# Patient Record
Sex: FEMALE | Race: WHITE | NOT HISPANIC OR LATINO | Employment: FULL TIME | URBAN - METROPOLITAN AREA
[De-identification: names, ages, dates, MRNs, and addresses within clinical notes are randomized per-mention and may not be internally consistent; named-entity substitution may affect disease eponyms.]

---

## 2021-06-12 ENCOUNTER — HOSPITAL ENCOUNTER (EMERGENCY)
Facility: HOSPITAL | Age: 31
Discharge: HOME | End: 2021-06-12
Attending: EMERGENCY MEDICINE | Admitting: EMERGENCY MEDICINE

## 2021-06-12 ENCOUNTER — APPOINTMENT (OUTPATIENT)
Dept: GENERAL RADIOLOGY | Facility: HOSPITAL | Age: 31
End: 2021-06-12

## 2021-06-12 VITALS
BODY MASS INDEX: 28.61 KG/M2 | DIASTOLIC BLOOD PRESSURE: 84 MMHG | OXYGEN SATURATION: 99 % | HEART RATE: 74 BPM | HEIGHT: 66 IN | RESPIRATION RATE: 18 BRPM | WEIGHT: 178 LBS | TEMPERATURE: 97.5 F | SYSTOLIC BLOOD PRESSURE: 121 MMHG

## 2021-06-12 DIAGNOSIS — R00.2 PALPITATIONS: Primary | ICD-10-CM

## 2021-06-12 DIAGNOSIS — F16.10 ECSTASY ABUSE (HCC): ICD-10-CM

## 2021-06-12 DIAGNOSIS — R79.89 ELEVATED BRAIN NATRIURETIC PEPTIDE (BNP) LEVEL: ICD-10-CM

## 2021-06-12 LAB
ALBUMIN SERPL-MCNC: 4 G/DL (ref 3.5–5.2)
ALBUMIN/GLOB SERPL: 1.2 G/DL
ALP SERPL-CCNC: 80 U/L (ref 39–117)
ALT SERPL W P-5'-P-CCNC: 13 U/L (ref 1–33)
AMPHET+METHAMPHET UR QL: POSITIVE
ANION GAP SERPL CALCULATED.3IONS-SCNC: 8.5 MMOL/L (ref 5–15)
AST SERPL-CCNC: 16 U/L (ref 1–32)
B-HCG UR QL: NEGATIVE
BARBITURATES UR QL SCN: NEGATIVE
BASOPHILS # BLD AUTO: 0.06 10*3/MM3 (ref 0–0.2)
BASOPHILS NFR BLD AUTO: 0.8 % (ref 0–1.5)
BENZODIAZ UR QL SCN: NEGATIVE
BILIRUB SERPL-MCNC: 0.3 MG/DL (ref 0–1.2)
BUN SERPL-MCNC: 7 MG/DL (ref 6–20)
BUN/CREAT SERPL: 11.5 (ref 7–25)
CALCIUM SPEC-SCNC: 9.6 MG/DL (ref 8.6–10.5)
CANNABINOIDS SERPL QL: NEGATIVE
CHLORIDE SERPL-SCNC: 106 MMOL/L (ref 98–107)
CO2 SERPL-SCNC: 28.5 MMOL/L (ref 22–29)
COCAINE UR QL: NEGATIVE
CREAT SERPL-MCNC: 0.61 MG/DL (ref 0.57–1)
D DIMER PPP FEU-MCNC: 0.43 MCGFEU/ML (ref 0–0.49)
DEPRECATED RDW RBC AUTO: 45.8 FL (ref 37–54)
EOSINOPHIL # BLD AUTO: 0.03 10*3/MM3 (ref 0–0.4)
EOSINOPHIL NFR BLD AUTO: 0.4 % (ref 0.3–6.2)
ERYTHROCYTE [DISTWIDTH] IN BLOOD BY AUTOMATED COUNT: 13.3 % (ref 12.3–15.4)
GFR SERPL CREATININE-BSD FRML MDRD: 114 ML/MIN/1.73
GFR SERPL CREATININE-BSD FRML MDRD: 139 ML/MIN/1.73
GLOBULIN UR ELPH-MCNC: 3.4 GM/DL
GLUCOSE SERPL-MCNC: 92 MG/DL (ref 65–99)
HCT VFR BLD AUTO: 43.4 % (ref 34–46.6)
HGB BLD-MCNC: 14.5 G/DL (ref 12–15.9)
IMM GRANULOCYTES # BLD AUTO: 0.04 10*3/MM3 (ref 0–0.05)
IMM GRANULOCYTES NFR BLD AUTO: 0.5 % (ref 0–0.5)
LYMPHOCYTES # BLD AUTO: 0.56 10*3/MM3 (ref 0.7–3.1)
LYMPHOCYTES NFR BLD AUTO: 7 % (ref 19.6–45.3)
MCH RBC QN AUTO: 31.3 PG (ref 26.6–33)
MCHC RBC AUTO-ENTMCNC: 33.4 G/DL (ref 31.5–35.7)
MCV RBC AUTO: 93.7 FL (ref 79–97)
METHADONE UR QL SCN: NEGATIVE
MONOCYTES # BLD AUTO: 0.86 10*3/MM3 (ref 0.1–0.9)
MONOCYTES NFR BLD AUTO: 10.8 % (ref 5–12)
NEUTROPHILS NFR BLD AUTO: 6.42 10*3/MM3 (ref 1.7–7)
NEUTROPHILS NFR BLD AUTO: 80.5 % (ref 42.7–76)
NRBC BLD AUTO-RTO: 0.1 /100 WBC (ref 0–0.2)
NT-PROBNP SERPL-MCNC: 782 PG/ML (ref 0–450)
OPIATES UR QL: NEGATIVE
OXYCODONE UR QL SCN: NEGATIVE
PLATELET # BLD AUTO: 243 10*3/MM3 (ref 140–450)
PMV BLD AUTO: 9.7 FL (ref 6–12)
POTASSIUM SERPL-SCNC: 3.9 MMOL/L (ref 3.5–5.2)
PROT SERPL-MCNC: 7.4 G/DL (ref 6–8.5)
RBC # BLD AUTO: 4.63 10*6/MM3 (ref 3.77–5.28)
SODIUM SERPL-SCNC: 143 MMOL/L (ref 136–145)
TROPONIN T SERPL-MCNC: <0.01 NG/ML (ref 0–0.03)
TROPONIN T SERPL-MCNC: <0.01 NG/ML (ref 0–0.03)
WBC # BLD AUTO: 7.97 10*3/MM3 (ref 3.4–10.8)

## 2021-06-12 PROCEDURE — 80307 DRUG TEST PRSMV CHEM ANLYZR: CPT | Performed by: EMERGENCY MEDICINE

## 2021-06-12 PROCEDURE — 85379 FIBRIN DEGRADATION QUANT: CPT | Performed by: EMERGENCY MEDICINE

## 2021-06-12 PROCEDURE — 81025 URINE PREGNANCY TEST: CPT | Performed by: EMERGENCY MEDICINE

## 2021-06-12 PROCEDURE — 80053 COMPREHEN METABOLIC PANEL: CPT | Performed by: EMERGENCY MEDICINE

## 2021-06-12 PROCEDURE — 83880 ASSAY OF NATRIURETIC PEPTIDE: CPT | Performed by: EMERGENCY MEDICINE

## 2021-06-12 PROCEDURE — 93010 ELECTROCARDIOGRAM REPORT: CPT | Performed by: INTERNAL MEDICINE

## 2021-06-12 PROCEDURE — 85025 COMPLETE CBC W/AUTO DIFF WBC: CPT | Performed by: EMERGENCY MEDICINE

## 2021-06-12 PROCEDURE — 99283 EMERGENCY DEPT VISIT LOW MDM: CPT

## 2021-06-12 PROCEDURE — 93005 ELECTROCARDIOGRAM TRACING: CPT | Performed by: EMERGENCY MEDICINE

## 2021-06-12 PROCEDURE — 71046 X-RAY EXAM CHEST 2 VIEWS: CPT

## 2021-06-12 PROCEDURE — 84484 ASSAY OF TROPONIN QUANT: CPT | Performed by: EMERGENCY MEDICINE

## 2021-06-12 RX ORDER — SODIUM CHLORIDE 0.9 % (FLUSH) 0.9 %
10 SYRINGE (ML) INJECTION AS NEEDED
Status: DISCONTINUED | OUTPATIENT
Start: 2021-06-12 | End: 2021-06-12 | Stop reason: HOSPADM

## 2021-06-12 RX ORDER — OMEPRAZOLE 20 MG/1
20 CAPSULE, DELAYED RELEASE ORAL DAILY
COMMUNITY

## 2021-06-12 RX ORDER — AMOXICILLIN AND CLAVULANATE POTASSIUM 875; 125 MG/1; MG/1
1 TABLET, FILM COATED ORAL 2 TIMES DAILY
Qty: 14 TABLET | Refills: 0 | Status: SHIPPED | OUTPATIENT
Start: 2021-06-12 | End: 2021-06-19

## 2021-06-12 RX ORDER — IBUPROFEN 200 MG
400 TABLET ORAL EVERY 6 HOURS PRN
COMMUNITY

## 2021-06-12 RX ADMIN — SODIUM CHLORIDE, POTASSIUM CHLORIDE, SODIUM LACTATE AND CALCIUM CHLORIDE 1000 ML: 600; 310; 30; 20 INJECTION, SOLUTION INTRAVENOUS at 17:28

## 2021-06-12 NOTE — DISCHARGE INSTRUCTIONS
Please return to the emergency department immediately if you have any chest pain, shortness of breath, palpitations, swelling in your legs, fevers, or any other concerning symptoms.

## 2021-06-12 NOTE — ED TRIAGE NOTES
.Pt masked on arrival, staff masked    Pt reports palpitations and legs feel week, was at a friends and did esctasy

## 2021-06-12 NOTE — ED PROVIDER NOTES
Subjective   30-year-old female with no significant past medical history is here for chief complaint of palpitations and shortness of breath.  History is obtained from the patient.  The patient states that yesterday night into early morning she did some ecstasy.  She states that she was drinking my cards lemonade.  She states that when she woke up this morning she felt like her heart was beating fast and that she was short of breath.  She denies any chest pain.  She denies any abdominal pain.  She denies any nausea, diaphoresis, family history of heart conditions, previous history of PE or DVT.  She denies any swelling in her legs.  Patient denies any tearing or ripping pain to her back.  She denies any abdominal pain, nausea, vomiting, diarrhea, fevers, chills, rashes, or any other symptoms.  She states that the ecstasy did not like her.  She states that she has done ecstasy before.  She states that she does smoke cigarettes.  Patient denies any suicidal ideations, homicidal ideations, or any hallucinations.          Review of Systems   All other systems reviewed and are negative.      History reviewed. No pertinent past medical history.    No Known Allergies    History reviewed. No pertinent surgical history.    History reviewed. No pertinent family history.    Social History     Socioeconomic History   • Marital status: Single     Spouse name: Not on file   • Number of children: Not on file   • Years of education: Not on file   • Highest education level: Not on file   Tobacco Use   • Smoking status: Current Every Day Smoker     Packs/day: 1.50     Types: Cigarettes   • Smokeless tobacco: Never Used   Vaping Use   • Vaping Use: Never used   Substance and Sexual Activity   • Alcohol use: Yes     Comment: 4 mikes hard drinks on days not working, pt works 4 days a week   • Drug use: Yes     Frequency: 1.0 times per week     Comment: Ecstacy once a week   • Sexual activity: Defer           Objective   Physical  Exam  Vitals reviewed.   Constitutional:       General: She is not in acute distress.     Appearance: She is not ill-appearing, toxic-appearing or diaphoretic.   HENT:      Head: Normocephalic and atraumatic.      Right Ear: External ear normal.      Left Ear: External ear normal.      Nose: Nose normal. No congestion or rhinorrhea.      Mouth/Throat:      Mouth: Mucous membranes are moist.      Pharynx: Oropharynx is clear. No oropharyngeal exudate or posterior oropharyngeal erythema.   Eyes:      General: No scleral icterus.        Right eye: No discharge.         Left eye: No discharge.      Extraocular Movements: Extraocular movements intact.      Conjunctiva/sclera: Conjunctivae normal.      Pupils: Pupils are equal, round, and reactive to light.   Cardiovascular:      Rate and Rhythm: Normal rate and regular rhythm.      Pulses: Normal pulses.      Heart sounds: Normal heart sounds. No murmur heard.   No friction rub. No gallop.    Pulmonary:      Effort: Pulmonary effort is normal. No respiratory distress.      Breath sounds: Normal breath sounds. No stridor. No wheezing, rhonchi or rales.   Chest:      Chest wall: No tenderness.   Abdominal:      General: Abdomen is flat. Bowel sounds are normal. There is no distension.      Tenderness: There is no abdominal tenderness. There is no right CVA tenderness, left CVA tenderness, guarding or rebound.   Musculoskeletal:      Cervical back: Normal range of motion and neck supple. No rigidity or tenderness.   Skin:     General: Skin is warm and dry.      Capillary Refill: Capillary refill takes less than 2 seconds.      Coloration: Skin is not jaundiced or pale.      Findings: No bruising, erythema, lesion or rash.   Neurological:      General: No focal deficit present.      Mental Status: She is alert and oriented to person, place, and time.      Sensory: No sensory deficit.      Motor: No weakness.   Psychiatric:         Mood and Affect: Mood normal.          Behavior: Behavior normal.         Thought Content: Thought content normal.         Procedures           ED Course  ED Course as of Jun 12 2049   Sat Jun 12, 2021   1720 I reviewed the EKG, normal sinus rhythm, no obvious ST elevation, rate of 68, no evidence of ST depressions, to inversion in lead III.    [KS]   1815 Amphet/Methamphet, Screen(!): Positive [KS]   1948 proBNP(!): 782.0 [KS]      ED Course User Index  [KS] Mervin Ospina MD                                           MDM  Number of Diagnoses or Management Options     Amount and/or Complexity of Data Reviewed  Clinical lab tests: ordered and reviewed  Tests in the radiology section of CPT®: ordered and reviewed    Risk of Complications, Morbidity, and/or Mortality  General comments: When I first saw the patient, the patient appeared nontoxic.  She did tell me that she had done ecstasy and had drank alcohol and that is why she was having the palpitations this morning when she woke up.  IV was started labs were obtained.  I did get a D-dimer that was negative making PE less likely.  I did get 2 troponins 2 hours apart that were normal and the patient does not really have a lot risk factors for cardiac disease besides her smoking.  She has no family history, no history of hypertension, hyperlipidemia, or diabetes.  Patient did have a T wave version lead III.  Patient's heart score was 2.  I did get a chest x-ray this was negative for pneumothorax.  Patient did have a possibility of viral pneumonia.  She does smoke.  We did go over that she needs to stop smoking.  I did tell her that if she continues having any kind of pain or any cough that she needs to get a CT of the chest given her smoking history since she does have history of cancer in her family.  The patient did express understanding.  I did call in for Augmentin 875 twice daily for 7 days. Pt was given strict return precautions including any chest pain, shortness of breath, fevers, cough, or any  other concerning symptoms.  I did review the chest x-ray with the patient.  She had initially been discharged but I did run behind her and did review the chest x-ray with her.  I did also recommend that she not smoke moving forward.  I also recommended that she did not do ecstasy.  Patient was in agreement.  I did tell her to  the prescription from [facility].  Patient told me that that was her preferred pharmacy location.  Patient was told to follow-up with cardiology as well given that the BNP was elevated.  I did go over this with her.  Patient was also in agreement for this.    Patient Progress  Patient progress: stable      Final diagnoses:   Palpitations   Ecstasy abuse (CMS/HCC)   Elevated brain natriuretic peptide (BNP) level       ED Disposition  ED Disposition     ED Disposition Condition Comment    Discharge Stable           Provider, No Known  [place] [id number] [phone]    Schedule an appointment as soon as possible for a visit in 2 days      Zay Mcmahon Jr., MD  [address] [place] [address] [phone]    Schedule an appointment as soon as possible for a visit in 3 days           Medication List      New Prescriptions    amoxicillin-clavulanate 875-125 MG per tablet  Commonly known as: AUGMENTIN  Take 1 tablet by mouth 2 (Two) Times a Day for 7 days.           Where to Get Your Medications      These medications were sent to [id number] - [place], [place] - [address]  AT [facility] - [phone]  - [phone] FX [address] [facility] [address]    Hours: 24-hours Phone: [phone]   · amoxicillin-clavulanate 875-125 MG per tablet          Mervin Ospina MD  06/12/21 2048       Mervin Ospina MD  06/12/21 2049

## 2021-06-12 NOTE — ED NOTES
Patient was placed in face mask in first look.  Patient was wearing a face mask throughout our encounter.  I wore protective eye protection throughout the encounter.  Hand hygiene was performed before and after patient encounter.        Courtney Dotson RN  06/12/21 5834

## 2021-06-13 LAB — QT INTERVAL: 424 MS

## 2021-06-19 ENCOUNTER — HOSPITAL ENCOUNTER (EMERGENCY)
Facility: HOSPITAL | Age: 31
Discharge: HOME | End: 2021-06-20
Attending: EMERGENCY MEDICINE | Admitting: EMERGENCY MEDICINE

## 2021-06-19 ENCOUNTER — APPOINTMENT (OUTPATIENT)
Dept: GENERAL RADIOLOGY | Facility: HOSPITAL | Age: 31
End: 2021-06-19

## 2021-06-19 DIAGNOSIS — R07.89 ATYPICAL CHEST PAIN: ICD-10-CM

## 2021-06-19 DIAGNOSIS — B34.8 RHINOVIRUS INFECTION: ICD-10-CM

## 2021-06-19 DIAGNOSIS — J12.9 VIRAL PNEUMONIA: Primary | ICD-10-CM

## 2021-06-19 LAB
ALBUMIN SERPL-MCNC: 3.7 G/DL (ref 3.5–5.2)
ALBUMIN/GLOB SERPL: 1.2 G/DL
ALP SERPL-CCNC: 78 U/L (ref 39–117)
ALT SERPL W P-5'-P-CCNC: 12 U/L (ref 1–33)
ANION GAP SERPL CALCULATED.3IONS-SCNC: 9.8 MMOL/L (ref 5–15)
AST SERPL-CCNC: 15 U/L (ref 1–32)
BASOPHILS # BLD AUTO: 0.08 10*3/MM3 (ref 0–0.2)
BASOPHILS NFR BLD AUTO: 1 % (ref 0–1.5)
BILIRUB SERPL-MCNC: 0.2 MG/DL (ref 0–1.2)
BUN SERPL-MCNC: 10 MG/DL (ref 6–20)
BUN/CREAT SERPL: 16.7 (ref 7–25)
CALCIUM SPEC-SCNC: 8.9 MG/DL (ref 8.6–10.5)
CHLORIDE SERPL-SCNC: 104 MMOL/L (ref 98–107)
CO2 SERPL-SCNC: 26.2 MMOL/L (ref 22–29)
CREAT SERPL-MCNC: 0.6 MG/DL (ref 0.57–1)
D DIMER PPP FEU-MCNC: 0.35 MCGFEU/ML (ref 0–0.49)
DEPRECATED RDW RBC AUTO: 42.1 FL (ref 37–54)
EOSINOPHIL # BLD AUTO: 0.08 10*3/MM3 (ref 0–0.4)
EOSINOPHIL NFR BLD AUTO: 1 % (ref 0.3–6.2)
ERYTHROCYTE [DISTWIDTH] IN BLOOD BY AUTOMATED COUNT: 13 % (ref 12.3–15.4)
GFR SERPL CREATININE-BSD FRML MDRD: 117 ML/MIN/1.73
GLOBULIN UR ELPH-MCNC: 3 GM/DL
GLUCOSE SERPL-MCNC: 125 MG/DL (ref 65–99)
HCT VFR BLD AUTO: 40 % (ref 34–46.6)
HGB BLD-MCNC: 13.8 G/DL (ref 12–15.9)
IMM GRANULOCYTES # BLD AUTO: 0.04 10*3/MM3 (ref 0–0.05)
IMM GRANULOCYTES NFR BLD AUTO: 0.5 % (ref 0–0.5)
LYMPHOCYTES # BLD AUTO: 0.58 10*3/MM3 (ref 0.7–3.1)
LYMPHOCYTES NFR BLD AUTO: 7 % (ref 19.6–45.3)
MCH RBC QN AUTO: 31 PG (ref 26.6–33)
MCHC RBC AUTO-ENTMCNC: 34.5 G/DL (ref 31.5–35.7)
MCV RBC AUTO: 89.9 FL (ref 79–97)
MONOCYTES # BLD AUTO: 0.73 10*3/MM3 (ref 0.1–0.9)
MONOCYTES NFR BLD AUTO: 8.8 % (ref 5–12)
NEUTROPHILS NFR BLD AUTO: 6.8 10*3/MM3 (ref 1.7–7)
NEUTROPHILS NFR BLD AUTO: 81.7 % (ref 42.7–76)
NRBC BLD AUTO-RTO: 0 /100 WBC (ref 0–0.2)
PLATELET # BLD AUTO: 253 10*3/MM3 (ref 140–450)
PMV BLD AUTO: 9.1 FL (ref 6–12)
POTASSIUM SERPL-SCNC: 3.6 MMOL/L (ref 3.5–5.2)
PROCALCITONIN SERPL-MCNC: 0.06 NG/ML (ref 0–0.25)
PROT SERPL-MCNC: 6.7 G/DL (ref 6–8.5)
RBC # BLD AUTO: 4.45 10*6/MM3 (ref 3.77–5.28)
SODIUM SERPL-SCNC: 140 MMOL/L (ref 136–145)
TROPONIN T SERPL-MCNC: <0.01 NG/ML (ref 0–0.03)
WBC # BLD AUTO: 8.31 10*3/MM3 (ref 3.4–10.8)

## 2021-06-19 PROCEDURE — 84145 PROCALCITONIN (PCT): CPT | Performed by: PHYSICIAN ASSISTANT

## 2021-06-19 PROCEDURE — 80053 COMPREHEN METABOLIC PANEL: CPT | Performed by: PHYSICIAN ASSISTANT

## 2021-06-19 PROCEDURE — 93005 ELECTROCARDIOGRAM TRACING: CPT

## 2021-06-19 PROCEDURE — 93005 ELECTROCARDIOGRAM TRACING: CPT | Performed by: EMERGENCY MEDICINE

## 2021-06-19 PROCEDURE — 93010 ELECTROCARDIOGRAM REPORT: CPT | Performed by: INTERNAL MEDICINE

## 2021-06-19 PROCEDURE — 0202U NFCT DS 22 TRGT SARS-COV-2: CPT | Performed by: PHYSICIAN ASSISTANT

## 2021-06-19 PROCEDURE — 85025 COMPLETE CBC W/AUTO DIFF WBC: CPT | Performed by: PHYSICIAN ASSISTANT

## 2021-06-19 PROCEDURE — 99284 EMERGENCY DEPT VISIT MOD MDM: CPT

## 2021-06-19 PROCEDURE — 85379 FIBRIN DEGRADATION QUANT: CPT | Performed by: PHYSICIAN ASSISTANT

## 2021-06-19 PROCEDURE — 71045 X-RAY EXAM CHEST 1 VIEW: CPT

## 2021-06-19 PROCEDURE — 99283 EMERGENCY DEPT VISIT LOW MDM: CPT

## 2021-06-19 PROCEDURE — 84484 ASSAY OF TROPONIN QUANT: CPT | Performed by: PHYSICIAN ASSISTANT

## 2021-06-19 RX ORDER — SODIUM CHLORIDE 0.9 % (FLUSH) 0.9 %
10 SYRINGE (ML) INJECTION AS NEEDED
Status: DISCONTINUED | OUTPATIENT
Start: 2021-06-19 | End: 2021-06-20 | Stop reason: HOSPADM

## 2021-06-20 VITALS
DIASTOLIC BLOOD PRESSURE: 70 MMHG | HEART RATE: 88 BPM | BODY MASS INDEX: 28.41 KG/M2 | WEIGHT: 176 LBS | TEMPERATURE: 97 F | RESPIRATION RATE: 20 BRPM | SYSTOLIC BLOOD PRESSURE: 112 MMHG | OXYGEN SATURATION: 99 %

## 2021-06-20 LAB
B PARAPERT DNA SPEC QL NAA+PROBE: NOT DETECTED
B PERT DNA SPEC QL NAA+PROBE: NOT DETECTED
C PNEUM DNA NPH QL NAA+NON-PROBE: NOT DETECTED
FLUAV SUBTYP SPEC NAA+PROBE: NOT DETECTED
FLUBV RNA ISLT QL NAA+PROBE: NOT DETECTED
HADV DNA SPEC NAA+PROBE: NOT DETECTED
HCOV 229E RNA SPEC QL NAA+PROBE: NOT DETECTED
HCOV HKU1 RNA SPEC QL NAA+PROBE: NOT DETECTED
HCOV NL63 RNA SPEC QL NAA+PROBE: NOT DETECTED
HCOV OC43 RNA SPEC QL NAA+PROBE: NOT DETECTED
HMPV RNA NPH QL NAA+NON-PROBE: NOT DETECTED
HPIV1 RNA SPEC QL NAA+PROBE: NOT DETECTED
HPIV2 RNA SPEC QL NAA+PROBE: NOT DETECTED
HPIV3 RNA NPH QL NAA+PROBE: NOT DETECTED
HPIV4 P GENE NPH QL NAA+PROBE: NOT DETECTED
M PNEUMO IGG SER IA-ACNC: NOT DETECTED
QT INTERVAL: 384 MS
RHINOVIRUS RNA SPEC NAA+PROBE: DETECTED
RSV RNA NPH QL NAA+NON-PROBE: NOT DETECTED
SARS-COV-2 RNA NPH QL NAA+NON-PROBE: NOT DETECTED

## 2021-06-20 RX ORDER — METHYLPREDNISOLONE 4 MG/1
TABLET ORAL
Qty: 1 EACH | Refills: 0 | Status: SHIPPED | OUTPATIENT
Start: 2021-06-20

## 2021-06-20 RX ORDER — METHYLPREDNISOLONE 4 MG/1
TABLET ORAL
Qty: 1 EACH | Refills: 0 | Status: SHIPPED | OUTPATIENT
Start: 2021-06-20 | End: 2021-06-20 | Stop reason: SDUPTHER

## 2021-06-20 RX ORDER — ALBUTEROL SULFATE 90 UG/1
2 AEROSOL, METERED RESPIRATORY (INHALATION) EVERY 6 HOURS PRN
Qty: 8 G | Refills: 0 | OUTPATIENT
Start: 2021-06-20 | End: 2023-04-05

## 2021-06-20 NOTE — ED PROVIDER NOTES
EMERGENCY DEPARTMENT ENCOUNTER    Room Number:  07/07  Date of encounter:  6/20/2021  PCP: Provider, No Known  Historian: Patient      HPI:  Chief Complaint: Chest pain, shortness of breath, pneumonia     A complete HPI/ROS/PMH/PSH/SH/FH are unobtainable due to: Nothing    Context: Alejo Pascal is a 30 y.o. female who presents to the ED c/o intermittent chest pain past 2 weeks.  Patient also states she has been feeling short of breath coughing up green phlegm, and was recently diagnosed with pneumonia.  She states she was placed on antibiotic and finished her last course earlier today.  She feels since the time of being placed on antibiotics and her symptoms got worse.  She says her whole household is sick at this time, so there are possible sick contacts.  She denies fever, chills, palpitations, unilateral leg swelling, nausea, vomiting, diaphoresis associated with her chest discomfort.    Patient was seen on 6/12/2021 diagnosed with ecstasy abuse, palpitations and elevated BNP.  She was placed on a course of Augmentin ID for 7 days.  Her chest x-ray did show ill-defined infiltrates in the bilateral upper lung zones in the right midlung concerning for pneumonia/possible viral pneumonia.      PAST MEDICAL HISTORY  Active Ambulatory Problems     Diagnosis Date Noted   • No Active Ambulatory Problems     Resolved Ambulatory Problems     Diagnosis Date Noted   • No Resolved Ambulatory Problems     No Additional Past Medical History         PAST SURGICAL HISTORY  No past surgical history on file.      FAMILY HISTORY  No family history on file.      SOCIAL HISTORY  Social History     Socioeconomic History   • Marital status: Single     Spouse name: Not on file   • Number of children: Not on file   • Years of education: Not on file   • Highest education level: Not on file   Tobacco Use   • Smoking status: Current Every Day Smoker     Packs/day: 1.50     Types: Cigarettes   • Smokeless tobacco: Never Used   Vaping Use    • Vaping Use: Never used   Substance and Sexual Activity   • Alcohol use: Yes     Comment: 4 mikes hard drinks on days not working, pt works 4 days a week   • Drug use: Yes     Frequency: 1.0 times per week     Comment: Ecstacy once a week   • Sexual activity: Defer         ALLERGIES  Patient has no known allergies.        REVIEW OF SYSTEMS  Review of Systems   Constitutional: Negative for fever.   HENT: Positive for congestion and postnasal drip.    Eyes: Negative.    Respiratory: Positive for cough and shortness of breath.    Cardiovascular: Positive for chest pain. Negative for palpitations and leg swelling.   Gastrointestinal: Negative for abdominal pain.   Genitourinary: Negative.    Musculoskeletal: Negative for back pain.   Skin: Negative.    Neurological: Negative.    Psychiatric/Behavioral: Negative.         All systems reviewed and negative except for those discussed in HPI.       PHYSICAL EXAM    I have reviewed the triage vital signs and nursing notes.    ED Triage Vitals [06/19/21 2034]   Temp Heart Rate Resp BP SpO2   98.2 °F (36.8 °C) 106 16 -- 97 %      Temp src Heart Rate Source Patient Position BP Location FiO2 (%)   Tympanic -- -- -- --       Physical Exam  GENERAL: Well-nourished, nontoxic in no acute distress  HENT: nares patent, face symmetric  EYES: no scleral icterus  CV: regular rhythm, regular rate  RESPIRATORY: normal effort, lungs CTA B  ABDOMEN: soft, nontender  MUSCULOSKELETAL: no deformity  NEURO: alert and oriented x3, moves all extremities, follows commands  SKIN: warm, dry        LAB RESULTS  Recent Results (from the past 24 hour(s))   ECG 12 Lead    Collection Time: 06/19/21  8:46 PM   Result Value Ref Range    QT Interval 384 ms   Comprehensive Metabolic Panel    Collection Time: 06/19/21  9:31 PM    Specimen: Blood   Result Value Ref Range    Glucose 125 (H) 65 - 99 mg/dL    BUN 10 6 - 20 mg/dL    Creatinine 0.60 0.57 - 1.00 mg/dL    Sodium 140 136 - 145 mmol/L    Potassium 3.6  3.5 - 5.2 mmol/L    Chloride 104 98 - 107 mmol/L    CO2 26.2 22.0 - 29.0 mmol/L    Calcium 8.9 8.6 - 10.5 mg/dL    Total Protein 6.7 6.0 - 8.5 g/dL    Albumin 3.70 3.50 - 5.20 g/dL    ALT (SGPT) 12 1 - 33 U/L    AST (SGOT) 15 1 - 32 U/L    Alkaline Phosphatase 78 39 - 117 U/L    Total Bilirubin 0.2 0.0 - 1.2 mg/dL    eGFR Non African Amer 117 >60 mL/min/1.73    Globulin 3.0 gm/dL    A/G Ratio 1.2 g/dL    BUN/Creatinine Ratio 16.7 7.0 - 25.0    Anion Gap 9.8 5.0 - 15.0 mmol/L   Procalcitonin    Collection Time: 06/19/21  9:31 PM    Specimen: Blood   Result Value Ref Range    Procalcitonin 0.06 0.00 - 0.25 ng/mL   D-dimer, Quantitative    Collection Time: 06/19/21  9:31 PM    Specimen: Blood   Result Value Ref Range    D-Dimer, Quantitative 0.35 0.00 - 0.49 MCGFEU/mL   CBC Auto Differential    Collection Time: 06/19/21  9:31 PM    Specimen: Blood   Result Value Ref Range    WBC 8.31 3.40 - 10.80 10*3/mm3    RBC 4.45 3.77 - 5.28 10*6/mm3    Hemoglobin 13.8 12.0 - 15.9 g/dL    Hematocrit 40.0 34.0 - 46.6 %    MCV 89.9 79.0 - 97.0 fL    MCH 31.0 26.6 - 33.0 pg    MCHC 34.5 31.5 - 35.7 g/dL    RDW 13.0 12.3 - 15.4 %    RDW-SD 42.1 37.0 - 54.0 fl    MPV 9.1 6.0 - 12.0 fL    Platelets 253 140 - 450 10*3/mm3    Neutrophil % 81.7 (H) 42.7 - 76.0 %    Lymphocyte % 7.0 (L) 19.6 - 45.3 %    Monocyte % 8.8 5.0 - 12.0 %    Eosinophil % 1.0 0.3 - 6.2 %    Basophil % 1.0 0.0 - 1.5 %    Immature Grans % 0.5 0.0 - 0.5 %    Neutrophils, Absolute 6.80 1.70 - 7.00 10*3/mm3    Lymphocytes, Absolute 0.58 (L) 0.70 - 3.10 10*3/mm3    Monocytes, Absolute 0.73 0.10 - 0.90 10*3/mm3    Eosinophils, Absolute 0.08 0.00 - 0.40 10*3/mm3    Basophils, Absolute 0.08 0.00 - 0.20 10*3/mm3    Immature Grans, Absolute 0.04 0.00 - 0.05 10*3/mm3    nRBC 0.0 0.0 - 0.2 /100 WBC   Troponin    Collection Time: 06/19/21  9:31 PM    Specimen: Blood   Result Value Ref Range    Troponin T <0.010 0.000 - 0.030 ng/mL   Respiratory Panel PCR w/COVID-19(SARS-CoV-2)  JOVITA/HUONG/SINA/PAD/COR/MAD/HANNA In-House, NP Swab in UTM/VTM, 3-4 HR TAT - Swab, Nasopharynx    Collection Time: 06/19/21  9:32 PM    Specimen: Nasopharynx; Swab   Result Value Ref Range    ADENOVIRUS, PCR Not Detected Not Detected    Coronavirus 229E Not Detected Not Detected    Coronavirus HKU1 Not Detected Not Detected    Coronavirus NL63 Not Detected Not Detected    Coronavirus OC43 Not Detected Not Detected    COVID19 Not Detected Not Detected - Ref. Range    Human Metapneumovirus Not Detected Not Detected    Human Rhinovirus/Enterovirus Detected (A) Not Detected    Influenza A PCR Not Detected Not Detected    Influenza B PCR Not Detected Not Detected    Parainfluenza Virus 1 Not Detected Not Detected    Parainfluenza Virus 2 Not Detected Not Detected    Parainfluenza Virus 3 Not Detected Not Detected    Parainfluenza Virus 4 Not Detected Not Detected    RSV, PCR Not Detected Not Detected    Bordetella pertussis pcr Not Detected Not Detected    Bordetella parapertussis PCR Not Detected Not Detected    Chlamydophila pneumoniae PCR Not Detected Not Detected    Mycoplasma pneumo by PCR Not Detected Not Detected       Ordered the above labs and independently reviewed the results.        RADIOLOGY  XR Chest 1 View    Result Date: 6/19/2021  CHEST SINGLE VIEW  HISTORY: Shortness of air and cough and chest discomfort  COMPARISON: Two-view chest 6/12/2021  FINDINGS: The heart and mediastinal structures within normal limits. Within the left lung apex there is a nodular opacity was evident on the previous exam and may represent residual infiltrate though continued follow-up or CT chest evaluation recommended to verify resolution or for more complete assessment. Lungs otherwise clear. There is no evidence for pulmonary edema or pleural effusion.      Persistent left apical nodular opacity. This could represent residual infiltrate though recommend continued follow-up to resolution or chest CT evaluation for attempt at further  assessment.  Discussed with FRIDA Ahmadi in the emergency department 6/19/2021 at 10:11 PM.  This report was finalized on 6/19/2021 10:11 PM by Dr. Sherif English M.D.        I ordered the above noted radiological studies. Reviewed by me and discussed with radiologist.  See dictation for official radiology interpretation.      PROCEDURES    Procedures      MEDICATIONS GIVEN IN ER    Medications   sodium chloride 0.9 % flush 10 mL (has no administration in time range)         PROGRESS, DATA ANALYSIS, CONSULTS, AND MEDICAL DECISION MAKING    All labs have been independently reviewed by me.  All radiology studies have been reviewed by me and discussed with radiologist dictating the report.   EKG's independently viewed and interpreted by me.  Discussion below represents my analysis of pertinent findings related to patient's condition, differential diagnosis, treatment plan and final disposition.    DDx includes but is not limited to: ACS, PTX, PE, community-acquired pneumonia, viral pneumonia, PVCs, PACs.  Will obtain a CBC, CMP, troponin, EKG, viral panel, chest x-ray to further evaluate the patient.  Please see below for MDM and course of care.    ED Course as of Jun 20 0050   Sat Jun 19, 2021 2206 EKG was done at 2046  EKG reading  It is a rate of 84 is normal sinus rhythm  Narrow complex with normal axis  I do not appreciate any acute ischemic injury pattern.  Do not appreciate any signs of pericarditis she has a normal S1Q3T3.  Patient's QT is normal  Compared to an EKG that was done June 12, 2021 and it looks very similar.    [MM]   2206 D-Dimer, Quant: 0.35 [MM]   2206 Troponin T: <0.010 [MM]   2206 I looked at her chest x-ray from today as well as the one from 6/12/2021 I do not appreciate any   Glucose(!): 125 [MM]   2227 I reviewed the radiologist report of the chest x-ray.  This persistent nonspecific nodularity on the chest x-ray.    [MM]   2227 See complete dictated report from the radiologist.     [MM]   Sun Jun 20, 2021   0037 Viral panel is still not back ambulate the patient at this time.  I will call the lab to add an ETA.    [RC]   0044 Discussed the viral panel with the lab.  Although it is not published yet the patient is positive for the rhinovirus.  With her procalcitonin being negative I think it is safe to say at this point that this is a viral pneumonia.  Plan to treat with inhaler and Medrol.  We will ensure the patient follows up in a month to get a repeat x-ray to ensure resolve of the infiltrates.  Given her ongoing atypical chest pain I will set her up with [facility] at her request.    [RC]   0047 HEART SCORE    History Slightly or non-suspicious (0)  ECG Normal (0)  Age < or = 45 (0)  Risk factors No risk factors (0)  Troponin < or = Normal limit (0)    This patient's HEART score is 0    HEART Score Key:  Scores 0-3: 0.9-1.7% risk of adverse cardiac event. In the HEART Score study, these patients were discharged (0.99% in the retrospective study, 1.7% in the prospective study)  Scores 4-6: 12-16.6% risk of adverse cardiac event. In the HEART Score study, these patients were admitted to the hospital. (11.6% retrospective, 16.6% prospective)  Scores ?7: 50-65% risk of adverse cardiac event. In the HEART Score study, these patients were candidates for early invasive measures. (65.2% retrospective, 50.1% prospective)       [RC]   0048 Patient CBC, CMP, troponin, procalcitonin, D-dimer are all normal.  Her respiratory viral panel does show the human rhinovirus.  Her chest x-ray shows unchanged to questionably resolving pulmonary infiltrates from 6/14/2020 visit.    [RC]      ED Course User Index  [MM] Fadi Worthy MD  [RC] Poli Hayden III, PA       Patient was placed in face mask in first look. Patient was wearing facemask when I entered the room and throughout our encounter. I wore full protective equipment throughout this patient encounter including a face mask, eye  shield, gown and gloves. Hand hygiene was performed before donning protective equipment and after removal when leaving the room.    AS OF 00:50 EDT VITALS:    BP - 110/76  HR - 83  TEMP - 98.2 °F (36.8 °C) (Tympanic)  O2 SATS - 96%        DIAGNOSIS  Final diagnoses:   Viral pneumonia   Rhinovirus infection   Atypical chest pain         DISPOSITION  DISCHARGE    Patient discharged in stable condition.    Reviewed implications of results, diagnosis, meds, responsibility to follow up, warning signs and symptoms of possible worsening, potential complications and reasons to return to ER.    Patient/Family voiced understanding of above instructions.    Discussed plan for discharge, as there is no emergent indication for admission. Patient referred to primary care provider for BP management due to today's BP. Pt/family is agreeable and understands need for follow up and repeat testing.  Pt is aware that discharge does not mean that nothing is wrong but it indicates no emergency is present that requires admission and they must continue care with follow-up as given below or physician of their choice.     FOLLOW-UP  [facility]  [address]  [facility] [address] [phone]  Schedule an appointment as soon as possible for a visit   For further evaluation and treatment    Your family physician    Schedule an appointment as soon as possible for a visit   For further evaluation and treatment, As needed.  Recommend repeat chest x-ray in a month's time to ensure resolve infiltrate         Medication List      New Prescriptions    albuterol sulfate  (90 Base) MCG/ACT inhaler  Commonly known as: PROVENTIL HFA;VENTOLIN HFA;PROAIR HFA  Inhale 2 puffs Every 6 (Six) Hours As Needed for Wheezing or Shortness of Air.     methylPREDNISolone 4 MG dose pack  Commonly known as: MEDROL  Take as directed on package instructions.        Stop    amoxicillin-clavulanate 875-125 MG per  tablet  Commonly known as: AUGMENTIN           Where to Get Your Medications      These medications were sent to [facility][id number] - [place], [place] - [address] [facility] - [phone] [facility] [phone] [address] [facility] [address]    Hours: 24-hours Phone: [phone]   · methylPREDNISolone 4 MG dose pack     You can get these medications from any pharmacy    Bring a paper prescription for each of these medications  · albuterol sulfate  (90 Base) MCG/ACT inhaler                Poli Hayden III, PA  06/20/21 0051

## 2021-06-20 NOTE — ED PROVIDER NOTES
I supervised care provided by the midlevel provider.   We have discussed this patient's history, physical exam, and treatment plan.  I have reviewed the note and personally saw and examined the patient and agree with the plan of care.   I have seen and examined this patient.  She comes here because she has had a persistent cough with some green phlegm she has had some chest pain associated with it.  Chest pain is usually located on the sides of her left and right breast and also right in her sternum and a little spread out from there.  It is a sharp stabbing pain.  It is worse with coughing.  It is also worse with deep breathing.  She has a productive cough of green phlegm.  She reports no fevers or chills.  She recently was in the emergency department and placed upon antibiotics for an abnormal chest x-ray concerning for potential pneumonia potentially viral pneumonia.  She has been taking his antibiotics.  She is smoked for 15 years.  She has no history of asthma or COPD that she is aware of.  She denies any stimulant drugs such as cocaine or methamphetamines.  She took ecstasy 1 time and that is why she was here in the emergency department the other day and is not taking it in the past.  She does not have any history of any heart problems or any history of blood clots in the past.  Does not complain of any pain or swelling to her lower extremities.  She did have little tingling in her fingertips but has known history of some chronic neck pain.  She has no motor impairment to her upper or lower extremities.  She has had multiple ill contacts with respiratory symptoms amongst family and friends at home.  She reports to me that this pain has been going on for probably a month.  Uncertain of the exact timeframe when it started.  GENERAL: not distressed.  Looks well in no distress  HENT: nares patent  Head/neck/ face are symmetric without gross deformity or swelling  EYES: no scleral icterus  CV: regular rhythm,  regular rate with intact distal pulses.  No murmur or rub.  RESPIRATORY: normal effort and no respiratory distress.  Has a very mild occasional cough.  I do not hear any wheezing.  Lungs are clear to auscultation bilaterally.  Pain is reproducible with coughing and deep breathing to the lateral aspects of her chest and also right under her knees her sternum.  ABDOMEN: soft and non-tender  MUSCULOSKELETAL: no deformity  NEURO: alert and appropriate, moves all extremities, follows commands  SKIN: warm, dry    Vital signs and nursing notes reviewed.    Plan when I am seeing her she has a heart rate in the 80s.  O2 sats 98% on room air.  Blood pressure is normal.  She is standing up at bedside and then sitting down and she is in no distress.  Informed her of the test that we will order.  I suspect that this very well could be some bronchitis and pain associated with that.  We will check a dimer, repeat an x-ray and check some lab work including heart enzymes.  This is very atypical for any cardiac etiology or something serious.  I explained this to the patient.  She is in no distress and no wheezing.  Does not need any breathing treatments at this time.  All questions answered.      We are currently under a pandemic from the COVID19 infection.  The patient presented to the emergency department by ambulance or personal vehicle. I followed the current protocols required by Infection Control at [facility] in my evaluation and treatment of the patient. The patient was wearing a face mask during my evaluation and throughout my encounter. During my whole encounter with this patient I used appropriate personal protective equipment.  This equipment consisted of eye protection, facemask, gown, and gloves.  I applied this equipment before entering the room.    ED Course as of Jun 21 2153   Sat Jun 19, 2021 2206 EKG was done at 2046  EKG reading  It is a rate of 84 is normal sinus rhythm  Narrow complex with  normal axis  I do not appreciate any acute ischemic injury pattern.  Do not appreciate any signs of pericarditis she has a normal S1Q3T3.  Patient's QT is normal  Compared to an EKG that was done June 12, 2021 and it looks very similar.    [MM]   2206 D-Dimer, Quant: 0.35 [MM]   2206 Troponin T: <0.010 [MM]   2206 I looked at her chest x-ray from today as well as the one from 6/12/2021 I do not appreciate any   Glucose(!): 125 [MM]   2227 I reviewed the radiologist report of the chest x-ray.  This persistent nonspecific nodularity on the chest x-ray.    [MM]   2227 See complete dictated report from the radiologist.    [MM]   Daniella Jun 20, 2021   0037 Viral panel is still not back ambulate the patient at this time.  I will call the lab to add an ETA.    [RC]   0044 Discussed the viral panel with the lab.  Although it is not published yet the patient is positive for the rhinovirus.  With her procalcitonin being negative I think it is safe to say at this point that this is a viral pneumonia.  Plan to treat with inhaler and Medrol.  We will ensure the patient follows up in a month to get a repeat x-ray to ensure resolve of the infiltrates.  Given her ongoing atypical chest pain I will set her up with [facility] at her request.    [RC]   0047 HEART SCORE    History Slightly or non-suspicious (0)  ECG Normal (0)  Age < or = 45 (0)  Risk factors No risk factors (0)  Troponin < or = Normal limit (0)    This patient's HEART score is 0    HEART Score Key:  Scores 0-3: 0.9-1.7% risk of adverse cardiac event. In the HEART Score study, these patients were discharged (0.99% in the retrospective study, 1.7% in the prospective study)  Scores 4-6: 12-16.6% risk of adverse cardiac event. In the HEART Score study, these patients were admitted to the hospital. (11.6% retrospective, 16.6% prospective)  Scores ?7: 50-65% risk of adverse cardiac event. In the HEART Score study, these patients were candidates for early invasive  measures. (65.2% retrospective, 50.1% prospective)       [RC]   0048 Patient CBC, CMP, troponin, procalcitonin, D-dimer are all normal.  Her respiratory viral panel does show the human rhinovirus.  Her chest x-ray shows unchanged to questionably resolving pulmonary infiltrates from 6/14/2020 visit.    [RC]      ED Course User Index  [MM] Fadi Worthy MD  [RC] Poli Hayden III, PA              Fadi Worthy MD  06/21/21 9214

## 2021-06-20 NOTE — ED NOTES
"C/o soa and chest discomfort with deep breaths  with bilat numb/tingling fingers that started today. +dx with pn last week and only has dose of abx left.     +cough \"but I cough all the time jean I am a smoker.\" +green phlem. No fevers at home. \"everyone in my house is sick right now.\"     Pt w skin pwd. Breathing even and unlabored. In nad.     +mask. +triage wearing appropriate PPE.      Birdie Hooker, RN  06/19/21 2033    "

## 2021-08-16 ENCOUNTER — HOSPITAL ENCOUNTER (EMERGENCY)
Facility: HOSPITAL | Age: 31
Discharge: LEFT BEFORE DISCHARGE | End: 2021-08-17

## 2021-08-16 VITALS
HEART RATE: 65 BPM | TEMPERATURE: 96.9 F | OXYGEN SATURATION: 98 % | WEIGHT: 190 LBS | BODY MASS INDEX: 30.53 KG/M2 | RESPIRATION RATE: 17 BRPM | HEIGHT: 66 IN

## 2021-08-16 PROCEDURE — 93010 ELECTROCARDIOGRAM REPORT: CPT | Performed by: INTERNAL MEDICINE

## 2021-08-16 PROCEDURE — 93005 ELECTROCARDIOGRAM TRACING: CPT

## 2021-08-16 PROCEDURE — 99211 OFF/OP EST MAY X REQ PHY/QHP: CPT

## 2021-08-17 LAB — QT INTERVAL: 446 MS

## 2021-08-17 NOTE — ED NOTES
Pt called to triage to be roomed, no answer. Attempted to call phone number on file for this pt; no response.      Breann Brown RN  08/17/21 0568

## 2021-08-17 NOTE — ED NOTES
Pt called for rooming, no answer.  Called cell number w/no response.     Mariela Shah RN  08/17/21 0601

## 2021-08-17 NOTE — ED NOTES
Pt arrives via PV with c/o SOA and chest pain x3 days. States her hands and mouth are now going numb. States she's been in contact with 2 people who have tested positive for COVID. Denies COVID vaccination. Pt a&ox4, abc's intact, NAD noted, ambulatory to triage.     Patient wearing mask during triage. RN wearing appropriate PPE during triage. Hand hygiene performed.       Alisha Arriaza, RN  08/16/21 0276

## 2021-09-02 ENCOUNTER — HOSPITAL ENCOUNTER (EMERGENCY)
Facility: HOSPITAL | Age: 31
Discharge: LEFT BEFORE DISCHARGE | End: 2021-09-03

## 2021-09-02 VITALS
DIASTOLIC BLOOD PRESSURE: 77 MMHG | OXYGEN SATURATION: 98 % | RESPIRATION RATE: 16 BRPM | HEIGHT: 66 IN | SYSTOLIC BLOOD PRESSURE: 117 MMHG | TEMPERATURE: 97.1 F | BODY MASS INDEX: 30.67 KG/M2 | HEART RATE: 87 BPM

## 2021-09-02 PROCEDURE — 99211 OFF/OP EST MAY X REQ PHY/QHP: CPT

## 2021-09-03 NOTE — ED NOTES
Pt ambulatory to triage from home with c/o short of air x 2 weeks, but states getting worse in the last few days. Pt states suffers from severe panic attacks and she takes medicine for it.  Pt wearing mask in triage.  Triage personnel wore appropriate PPE       Kailee Teresa RN  09/02/21 9875

## 2021-09-04 ENCOUNTER — APPOINTMENT (OUTPATIENT)
Dept: GENERAL RADIOLOGY | Facility: HOSPITAL | Age: 31
End: 2021-09-04

## 2021-09-04 ENCOUNTER — HOSPITAL ENCOUNTER (EMERGENCY)
Facility: HOSPITAL | Age: 31
Discharge: HOME | End: 2021-09-04
Attending: EMERGENCY MEDICINE | Admitting: EMERGENCY MEDICINE

## 2021-09-04 VITALS
SYSTOLIC BLOOD PRESSURE: 105 MMHG | BODY MASS INDEX: 30.67 KG/M2 | DIASTOLIC BLOOD PRESSURE: 69 MMHG | TEMPERATURE: 97.7 F | OXYGEN SATURATION: 97 % | HEART RATE: 72 BPM | HEIGHT: 66 IN | RESPIRATION RATE: 16 BRPM

## 2021-09-04 DIAGNOSIS — F41.0 PANIC ATTACK: Primary | ICD-10-CM

## 2021-09-04 LAB
ALBUMIN SERPL-MCNC: 4.1 G/DL (ref 3.5–5.2)
ALBUMIN/GLOB SERPL: 1.4 G/DL
ALP SERPL-CCNC: 80 U/L (ref 39–117)
ALT SERPL W P-5'-P-CCNC: 10 U/L (ref 1–33)
ANION GAP SERPL CALCULATED.3IONS-SCNC: 9.4 MMOL/L (ref 5–15)
AST SERPL-CCNC: 10 U/L (ref 1–32)
BASOPHILS # BLD AUTO: 0.08 10*3/MM3 (ref 0–0.2)
BASOPHILS NFR BLD AUTO: 1.2 % (ref 0–1.5)
BILIRUB SERPL-MCNC: 0.2 MG/DL (ref 0–1.2)
BUN SERPL-MCNC: 25 MG/DL (ref 6–20)
BUN/CREAT SERPL: 32.9 (ref 7–25)
CALCIUM SPEC-SCNC: 9.1 MG/DL (ref 8.6–10.5)
CHLORIDE SERPL-SCNC: 104 MMOL/L (ref 98–107)
CO2 SERPL-SCNC: 25.6 MMOL/L (ref 22–29)
CREAT SERPL-MCNC: 0.76 MG/DL (ref 0.57–1)
DEPRECATED RDW RBC AUTO: 48.7 FL (ref 37–54)
EOSINOPHIL # BLD AUTO: 0.22 10*3/MM3 (ref 0–0.4)
EOSINOPHIL NFR BLD AUTO: 3.2 % (ref 0.3–6.2)
ERYTHROCYTE [DISTWIDTH] IN BLOOD BY AUTOMATED COUNT: 14.1 % (ref 12.3–15.4)
GFR SERPL CREATININE-BSD FRML MDRD: 88 ML/MIN/1.73
GLOBULIN UR ELPH-MCNC: 2.9 GM/DL
GLUCOSE SERPL-MCNC: 79 MG/DL (ref 65–99)
HCG SERPL QL: NEGATIVE
HCT VFR BLD AUTO: 40.9 % (ref 34–46.6)
HGB BLD-MCNC: 13.4 G/DL (ref 12–15.9)
IMM GRANULOCYTES # BLD AUTO: 0.05 10*3/MM3 (ref 0–0.05)
IMM GRANULOCYTES NFR BLD AUTO: 0.7 % (ref 0–0.5)
LYMPHOCYTES # BLD AUTO: 1.32 10*3/MM3 (ref 0.7–3.1)
LYMPHOCYTES NFR BLD AUTO: 19.2 % (ref 19.6–45.3)
MCH RBC QN AUTO: 31.1 PG (ref 26.6–33)
MCHC RBC AUTO-ENTMCNC: 32.8 G/DL (ref 31.5–35.7)
MCV RBC AUTO: 94.9 FL (ref 79–97)
MONOCYTES # BLD AUTO: 0.92 10*3/MM3 (ref 0.1–0.9)
MONOCYTES NFR BLD AUTO: 13.4 % (ref 5–12)
NEUTROPHILS NFR BLD AUTO: 4.27 10*3/MM3 (ref 1.7–7)
NEUTROPHILS NFR BLD AUTO: 62.3 % (ref 42.7–76)
NRBC BLD AUTO-RTO: 0 /100 WBC (ref 0–0.2)
NT-PROBNP SERPL-MCNC: 231.1 PG/ML (ref 0–450)
PLATELET # BLD AUTO: 250 10*3/MM3 (ref 140–450)
PMV BLD AUTO: 10.1 FL (ref 6–12)
POTASSIUM SERPL-SCNC: 3.7 MMOL/L (ref 3.5–5.2)
PROT SERPL-MCNC: 7 G/DL (ref 6–8.5)
QT INTERVAL: 445 MS
RBC # BLD AUTO: 4.31 10*6/MM3 (ref 3.77–5.28)
SODIUM SERPL-SCNC: 139 MMOL/L (ref 136–145)
TROPONIN T SERPL-MCNC: <0.01 NG/ML (ref 0–0.03)
WBC # BLD AUTO: 6.86 10*3/MM3 (ref 3.4–10.8)

## 2021-09-04 PROCEDURE — 80053 COMPREHEN METABOLIC PANEL: CPT | Performed by: EMERGENCY MEDICINE

## 2021-09-04 PROCEDURE — 93010 ELECTROCARDIOGRAM REPORT: CPT | Performed by: INTERNAL MEDICINE

## 2021-09-04 PROCEDURE — 71045 X-RAY EXAM CHEST 1 VIEW: CPT

## 2021-09-04 PROCEDURE — 85025 COMPLETE CBC W/AUTO DIFF WBC: CPT | Performed by: EMERGENCY MEDICINE

## 2021-09-04 PROCEDURE — 84703 CHORIONIC GONADOTROPIN ASSAY: CPT | Performed by: EMERGENCY MEDICINE

## 2021-09-04 PROCEDURE — 83880 ASSAY OF NATRIURETIC PEPTIDE: CPT | Performed by: EMERGENCY MEDICINE

## 2021-09-04 PROCEDURE — 93005 ELECTROCARDIOGRAM TRACING: CPT | Performed by: EMERGENCY MEDICINE

## 2021-09-04 PROCEDURE — 84484 ASSAY OF TROPONIN QUANT: CPT | Performed by: EMERGENCY MEDICINE

## 2021-09-04 PROCEDURE — 99283 EMERGENCY DEPT VISIT LOW MDM: CPT

## 2021-09-04 RX ORDER — HYDROXYZINE HYDROCHLORIDE 10 MG/1
10 TABLET, FILM COATED ORAL 3 TIMES DAILY PRN
COMMUNITY

## 2021-09-04 RX ORDER — LORAZEPAM 1 MG/1
0.5 TABLET ORAL ONCE
Status: COMPLETED | OUTPATIENT
Start: 2021-09-04 | End: 2021-09-04

## 2021-09-04 RX ORDER — PROPRANOLOL HYDROCHLORIDE 20 MG/1
20 TABLET ORAL 4 TIMES DAILY
COMMUNITY

## 2021-09-04 RX ADMIN — LORAZEPAM 0.5 MG: 1 TABLET ORAL at 06:06

## 2021-09-04 NOTE — ED NOTES
Discharge information and instructions given to patient.  Medications discussed and all questions answered.  VSS, no distress noted.  Appropriate PPE worn at all times during pt care and interactions.     Wernick, Mariela, RN  09/04/21 7212

## 2021-09-04 NOTE — ED PROVIDER NOTES
EMERGENCY DEPARTMENT ENCOUNTER    Room Number:  13/13  Date of encounter:  9/4/2021  PCP: Mary Huber APRN  Historian: Patient      HPI:  Chief Complaint: Short of air chest pain  A complete HPI/ROS/PMH/PSH/SH/FH are unobtainable due to: None    Context: Alejo Pascal is a 31 y.o. female who presents to the ED c/o chest tightness and feeling like she is having palpitations and could not breathe.  States similar to prior panic attacks.  States that it occurred just prior to arrival.  Patient was recently started on hydroxyzine by her PMD.  Patient states that she is feeling somewhat better but is not completely resolved yet.      PAST MEDICAL HISTORY  Active Ambulatory Problems     Diagnosis Date Noted   • No Active Ambulatory Problems     Resolved Ambulatory Problems     Diagnosis Date Noted   • No Resolved Ambulatory Problems     Past Medical History:   Diagnosis Date   • Anxiety    • Panic attack          PAST SURGICAL HISTORY  History reviewed. No pertinent surgical history.      FAMILY HISTORY  History reviewed. No pertinent family history.      SOCIAL HISTORY  Social History     Socioeconomic History   • Marital status: Single     Spouse name: Not on file   • Number of children: Not on file   • Years of education: Not on file   • Highest education level: Not on file   Tobacco Use   • Smoking status: Current Every Day Smoker     Packs/day: 1.50     Types: Cigarettes   • Smokeless tobacco: Never Used   Vaping Use   • Vaping Use: Never used   Substance and Sexual Activity   • Alcohol use: Yes     Comment: 4 mikes hard drinks on days not working, pt works 4 days a week   • Drug use: Yes     Frequency: 1.0 times per week     Comment: Ecstacy once a week   • Sexual activity: Defer         ALLERGIES  Patient has no known allergies.        REVIEW OF SYSTEMS  Review of Systems     All systems reviewed and negative except for those discussed in HPI.       PHYSICAL EXAM    I have reviewed the triage vital signs and  nursing notes.    ED Triage Vitals [09/04/21 0135]   Temp Heart Rate Resp BP SpO2   97.7 °F (36.5 °C) 81 16 127/78 98 %      Temp src Heart Rate Source Patient Position BP Location FiO2 (%)   Temporal Monitor Standing Right arm --       Physical Exam  GENERAL: not distressed  HENT: nares patent  EYES: no scleral icterus  CV: regular rhythm, regular rate  RESPIRATORY: normal effort, clear to auscultation bilaterally  ABDOMEN: soft, nontender nondistended  MUSCULOSKELETAL: no deformity  NEURO: alert, moves all extremities, follows commands  SKIN: warm, dry        LAB RESULTS  Recent Results (from the past 24 hour(s))   ECG 12 Lead    Collection Time: 09/04/21  4:15 AM   Result Value Ref Range    QT Interval 445 ms   hCG, Serum, Qualitative    Collection Time: 09/04/21  4:18 AM    Specimen: Blood   Result Value Ref Range    HCG Qualitative Negative Negative   CBC Auto Differential    Collection Time: 09/04/21  4:18 AM    Specimen: Blood   Result Value Ref Range    WBC 6.86 3.40 - 10.80 10*3/mm3    RBC 4.31 3.77 - 5.28 10*6/mm3    Hemoglobin 13.4 12.0 - 15.9 g/dL    Hematocrit 40.9 34.0 - 46.6 %    MCV 94.9 79.0 - 97.0 fL    MCH 31.1 26.6 - 33.0 pg    MCHC 32.8 31.5 - 35.7 g/dL    RDW 14.1 12.3 - 15.4 %    RDW-SD 48.7 37.0 - 54.0 fl    MPV 10.1 6.0 - 12.0 fL    Platelets 250 140 - 450 10*3/mm3    Neutrophil % 62.3 42.7 - 76.0 %    Lymphocyte % 19.2 (L) 19.6 - 45.3 %    Monocyte % 13.4 (H) 5.0 - 12.0 %    Eosinophil % 3.2 0.3 - 6.2 %    Basophil % 1.2 0.0 - 1.5 %    Immature Grans % 0.7 (H) 0.0 - 0.5 %    Neutrophils, Absolute 4.27 1.70 - 7.00 10*3/mm3    Lymphocytes, Absolute 1.32 0.70 - 3.10 10*3/mm3    Monocytes, Absolute 0.92 (H) 0.10 - 0.90 10*3/mm3    Eosinophils, Absolute 0.22 0.00 - 0.40 10*3/mm3    Basophils, Absolute 0.08 0.00 - 0.20 10*3/mm3    Immature Grans, Absolute 0.05 0.00 - 0.05 10*3/mm3    nRBC 0.0 0.0 - 0.2 /100 WBC   Comprehensive Metabolic Panel    Collection Time: 09/04/21  4:32 AM    Specimen:  Blood   Result Value Ref Range    Glucose 79 65 - 99 mg/dL    BUN 25 (H) 6 - 20 mg/dL    Creatinine 0.76 0.57 - 1.00 mg/dL    Sodium 139 136 - 145 mmol/L    Potassium 3.7 3.5 - 5.2 mmol/L    Chloride 104 98 - 107 mmol/L    CO2 25.6 22.0 - 29.0 mmol/L    Calcium 9.1 8.6 - 10.5 mg/dL    Total Protein 7.0 6.0 - 8.5 g/dL    Albumin 4.10 3.50 - 5.20 g/dL    ALT (SGPT) 10 1 - 33 U/L    AST (SGOT) 10 1 - 32 U/L    Alkaline Phosphatase 80 39 - 117 U/L    Total Bilirubin 0.2 0.0 - 1.2 mg/dL    eGFR Non African Amer 88 >60 mL/min/1.73    Globulin 2.9 gm/dL    A/G Ratio 1.4 g/dL    BUN/Creatinine Ratio 32.9 (H) 7.0 - 25.0    Anion Gap 9.4 5.0 - 15.0 mmol/L   BNP    Collection Time: 09/04/21  4:32 AM    Specimen: Blood   Result Value Ref Range    proBNP 231.1 0.0 - 450.0 pg/mL   Troponin    Collection Time: 09/04/21  4:32 AM    Specimen: Blood   Result Value Ref Range    Troponin T <0.010 0.000 - 0.030 ng/mL       Ordered the above labs and independently reviewed the results.        RADIOLOGY  XR Chest 1 View    Result Date: 9/4/2021  SINGLE VIEW OF THE CHEST  HISTORY: Chest pain and shortness of air  COMPARISON: 06/19/2021  FINDINGS: Heart size is within normal limits. Lungs appear clear. No pneumothorax, pleural effusion, or acute infiltrate is seen. There is a subtle opacity noted at the left lung apex, which appears stable when compared to prior exam. It may represent an area of scarring.      Persistent opacity noted at the left lung apex may reflect an area of scarring. CT could be considered for further assessment.  This report was finalized on 9/4/2021 5:16 AM by Dr. Mirella Gtz M.D.        I ordered the above noted radiological studies. Reviewed by me and discussed with radiologist.  See dictation for official radiology interpretation.      PROCEDURES    Procedures      MEDICATIONS GIVEN IN ER    Medications   LORazepam (ATIVAN) tablet 0.5 mg (0.5 mg Oral Given 9/4/21 0606)         PROGRESS, DATA ANALYSIS,  CONSULTS, AND MEDICAL DECISION MAKING    All labs have been independently reviewed by me.  All radiology studies have been reviewed by me and discussed with radiologist dictating the report.   EKG's independently viewed and interpreted by me.  Discussion below represents my analysis of pertinent findings related to patient's condition, differential diagnosis, treatment plan and final disposition.        ED Course as of Sep 04 0620   Sat Sep 04, 2021   0506 EKG          EKG time: 0 415  Rhythm/Rate: Sinus rhythm rate 63  P waves and MD: Normal  QRS, axis: Normal  ST and T waves: Normal    Interpreted Contemporaneously by me, independently viewed  No significant changed compared to prior EKG 8/16/2021      [TJ]      ED Course User Index  [TJ] Dawson Ballesteros MD           PPE: Both the patient and I wore a surgical mask throughout the entire patient encounter. I wore protective goggles.     AS OF 06:20 EDT VITALS:    BP - 105/69  HR - 72  TEMP - 97.7 °F (36.5 °C) (Temporal)  O2 SATS - 97%        DIAGNOSIS  Final diagnoses:   Panic attack         DISPOSITION  Discharge           Dawson Ballesteros MD  09/04/21 0620

## 2021-09-04 NOTE — ED NOTES
Discharge information and instructions given to patient.  Medications discussed and all questions answered.  VSS, no distress noted.  Appropriate PPE worn at all times during pt care and interactions.     Mariela Shah RN  09/04/21 6229

## 2021-09-04 NOTE — ED TRIAGE NOTES
Pt ambulatory to triage from home with c/o short of air, also states extremities numb, feels like having a panic attack - states spoke to her pmd today and meds changed, but they aren't helping yet.  Pt wearing mask in triage.  Triage personnel wore appropriate PPE

## 2021-10-01 ENCOUNTER — APPOINTMENT (OUTPATIENT)
Dept: GENERAL RADIOLOGY | Facility: HOSPITAL | Age: 31
End: 2021-10-01

## 2021-10-01 ENCOUNTER — HOSPITAL ENCOUNTER (EMERGENCY)
Facility: HOSPITAL | Age: 31
Discharge: HOME | End: 2021-10-02
Attending: EMERGENCY MEDICINE | Admitting: EMERGENCY MEDICINE

## 2021-10-01 DIAGNOSIS — U07.1 COVID-19: Primary | ICD-10-CM

## 2021-10-01 LAB
BASOPHILS # BLD AUTO: 0.05 10*3/MM3 (ref 0–0.2)
BASOPHILS NFR BLD AUTO: 0.7 % (ref 0–1.5)
DEPRECATED RDW RBC AUTO: 47.3 FL (ref 37–54)
EOSINOPHIL # BLD AUTO: 0.1 10*3/MM3 (ref 0–0.4)
EOSINOPHIL NFR BLD AUTO: 1.5 % (ref 0.3–6.2)
ERYTHROCYTE [DISTWIDTH] IN BLOOD BY AUTOMATED COUNT: 13.9 % (ref 12.3–15.4)
HCG SERPL QL: NEGATIVE
HCT VFR BLD AUTO: 39.8 % (ref 34–46.6)
HGB BLD-MCNC: 13.6 G/DL (ref 12–15.9)
IMM GRANULOCYTES # BLD AUTO: 0.04 10*3/MM3 (ref 0–0.05)
IMM GRANULOCYTES NFR BLD AUTO: 0.6 % (ref 0–0.5)
LYMPHOCYTES # BLD AUTO: 1.06 10*3/MM3 (ref 0.7–3.1)
LYMPHOCYTES NFR BLD AUTO: 15.4 % (ref 19.6–45.3)
MCH RBC QN AUTO: 31.9 PG (ref 26.6–33)
MCHC RBC AUTO-ENTMCNC: 34.2 G/DL (ref 31.5–35.7)
MCV RBC AUTO: 93.2 FL (ref 79–97)
MONOCYTES # BLD AUTO: 0.67 10*3/MM3 (ref 0.1–0.9)
MONOCYTES NFR BLD AUTO: 9.8 % (ref 5–12)
NEUTROPHILS NFR BLD AUTO: 4.95 10*3/MM3 (ref 1.7–7)
NEUTROPHILS NFR BLD AUTO: 72 % (ref 42.7–76)
NRBC BLD AUTO-RTO: 0 /100 WBC (ref 0–0.2)
PLATELET # BLD AUTO: 218 10*3/MM3 (ref 140–450)
PMV BLD AUTO: 9.5 FL (ref 6–12)
RBC # BLD AUTO: 4.27 10*6/MM3 (ref 3.77–5.28)
SARS-COV-2 RNA PNL SPEC NAA+PROBE: DETECTED
WBC # BLD AUTO: 6.87 10*3/MM3 (ref 3.4–10.8)

## 2021-10-01 PROCEDURE — 80053 COMPREHEN METABOLIC PANEL: CPT | Performed by: EMERGENCY MEDICINE

## 2021-10-01 PROCEDURE — 93010 ELECTROCARDIOGRAM REPORT: CPT | Performed by: INTERNAL MEDICINE

## 2021-10-01 PROCEDURE — 84703 CHORIONIC GONADOTROPIN ASSAY: CPT | Performed by: EMERGENCY MEDICINE

## 2021-10-01 PROCEDURE — 84484 ASSAY OF TROPONIN QUANT: CPT | Performed by: EMERGENCY MEDICINE

## 2021-10-01 PROCEDURE — 93005 ELECTROCARDIOGRAM TRACING: CPT

## 2021-10-01 PROCEDURE — 93005 ELECTROCARDIOGRAM TRACING: CPT | Performed by: EMERGENCY MEDICINE

## 2021-10-01 PROCEDURE — 85025 COMPLETE CBC W/AUTO DIFF WBC: CPT | Performed by: EMERGENCY MEDICINE

## 2021-10-01 PROCEDURE — 87635 SARS-COV-2 COVID-19 AMP PRB: CPT

## 2021-10-01 PROCEDURE — 99283 EMERGENCY DEPT VISIT LOW MDM: CPT

## 2021-10-01 PROCEDURE — 71046 X-RAY EXAM CHEST 2 VIEWS: CPT

## 2021-10-01 RX ORDER — ONDANSETRON 2 MG/ML
4 INJECTION INTRAMUSCULAR; INTRAVENOUS ONCE
Status: COMPLETED | OUTPATIENT
Start: 2021-10-01 | End: 2021-10-02

## 2021-10-01 RX ORDER — SODIUM CHLORIDE 0.9 % (FLUSH) 0.9 %
10 SYRINGE (ML) INJECTION AS NEEDED
Status: DISCONTINUED | OUTPATIENT
Start: 2021-10-01 | End: 2021-10-02 | Stop reason: HOSPADM

## 2021-10-01 RX ORDER — MORPHINE SULFATE 2 MG/ML
2 INJECTION, SOLUTION INTRAMUSCULAR; INTRAVENOUS ONCE
Status: COMPLETED | OUTPATIENT
Start: 2021-10-01 | End: 2021-10-02

## 2021-10-02 ENCOUNTER — APPOINTMENT (OUTPATIENT)
Dept: CT IMAGING | Facility: HOSPITAL | Age: 31
End: 2021-10-02

## 2021-10-02 VITALS
DIASTOLIC BLOOD PRESSURE: 86 MMHG | BODY MASS INDEX: 30.67 KG/M2 | HEIGHT: 66 IN | RESPIRATION RATE: 16 BRPM | SYSTOLIC BLOOD PRESSURE: 144 MMHG | TEMPERATURE: 98.4 F | OXYGEN SATURATION: 99 % | HEART RATE: 64 BPM

## 2021-10-02 LAB
ALBUMIN SERPL-MCNC: 4.1 G/DL (ref 3.5–5.2)
ALBUMIN/GLOB SERPL: 1.2 G/DL
ALP SERPL-CCNC: 78 U/L (ref 39–117)
ALT SERPL W P-5'-P-CCNC: 10 U/L (ref 1–33)
ANION GAP SERPL CALCULATED.3IONS-SCNC: 8.7 MMOL/L (ref 5–15)
AST SERPL-CCNC: 14 U/L (ref 1–32)
BILIRUB SERPL-MCNC: 0.4 MG/DL (ref 0–1.2)
BUN SERPL-MCNC: 12 MG/DL (ref 6–20)
BUN/CREAT SERPL: 22.2 (ref 7–25)
CALCIUM SPEC-SCNC: 9.4 MG/DL (ref 8.6–10.5)
CHLORIDE SERPL-SCNC: 106 MMOL/L (ref 98–107)
CO2 SERPL-SCNC: 24.3 MMOL/L (ref 22–29)
CREAT SERPL-MCNC: 0.54 MG/DL (ref 0.57–1)
GFR SERPL CREATININE-BSD FRML MDRD: 131 ML/MIN/1.73
GLOBULIN UR ELPH-MCNC: 3.4 GM/DL
GLUCOSE SERPL-MCNC: 87 MG/DL (ref 65–99)
HOLD SPECIMEN: NORMAL
POTASSIUM SERPL-SCNC: 3.9 MMOL/L (ref 3.5–5.2)
PROT SERPL-MCNC: 7.5 G/DL (ref 6–8.5)
QT INTERVAL: 423 MS
SODIUM SERPL-SCNC: 139 MMOL/L (ref 136–145)
TROPONIN T SERPL-MCNC: <0.01 NG/ML (ref 0–0.03)
TROPONIN T SERPL-MCNC: <0.01 NG/ML (ref 0–0.03)
WHOLE BLOOD HOLD SPECIMEN: NORMAL
WHOLE BLOOD HOLD SPECIMEN: NORMAL

## 2021-10-02 PROCEDURE — 25010000002 MORPHINE PER 10 MG: Performed by: EMERGENCY MEDICINE

## 2021-10-02 PROCEDURE — 84484 ASSAY OF TROPONIN QUANT: CPT | Performed by: EMERGENCY MEDICINE

## 2021-10-02 PROCEDURE — 0 IOPAMIDOL PER 1 ML: Performed by: EMERGENCY MEDICINE

## 2021-10-02 PROCEDURE — 96375 TX/PRO/DX INJ NEW DRUG ADDON: CPT

## 2021-10-02 PROCEDURE — 25010000002 ONDANSETRON PER 1 MG: Performed by: EMERGENCY MEDICINE

## 2021-10-02 PROCEDURE — 71275 CT ANGIOGRAPHY CHEST: CPT

## 2021-10-02 PROCEDURE — 96374 THER/PROPH/DIAG INJ IV PUSH: CPT

## 2021-10-02 RX ORDER — HYDROXYZINE HYDROCHLORIDE 10 MG/1
10 TABLET, FILM COATED ORAL ONCE
Status: COMPLETED | OUTPATIENT
Start: 2021-10-02 | End: 2021-10-02

## 2021-10-02 RX ADMIN — MORPHINE SULFATE 2 MG: 2 INJECTION, SOLUTION INTRAMUSCULAR; INTRAVENOUS at 00:18

## 2021-10-02 RX ADMIN — IOPAMIDOL 85 ML: 755 INJECTION, SOLUTION INTRAVENOUS at 01:02

## 2021-10-02 RX ADMIN — HYDROXYZINE HYDROCHLORIDE 10 MG: 10 TABLET ORAL at 02:12

## 2021-10-02 RX ADMIN — ONDANSETRON 4 MG: 2 INJECTION INTRAMUSCULAR; INTRAVENOUS at 00:18

## 2021-10-02 NOTE — DISCHARGE INSTRUCTIONS
You have scarring in bilateral apical lungs you to follow-up with your primary care doctor you may need a PET scan for further characterization.  Also have a lung nodule follow-up for CT of your chest to document stability in 6 months.

## 2021-10-02 NOTE — ED PROVIDER NOTES
EMERGENCY DEPARTMENT ENCOUNTER    Room Number:  12/12  Date of encounter:  10/2/2021  PCP: Mary Huber APRN  Historian: Patient      HPI:  Chief Complaint: Chest pain, dyspnea, dizziness  A complete HPI/ROS/PMH/PSH/SH/FH are unobtainable due to: None    Context: Alejo Pascal is a 31 y.o. female who presents to the ED c/o pleuritic chest pain and dizziness with dyspnea for the past 26 hours. Patient states her symptoms have been worse and she was called and medical at work who advised her to come to the ER secondary to increased blood pressure. Patient denies any chest pain at rest however describes bilateral lower chest pain with deep inspiration. Patient denies any fevers or chills that she has been having a cough that is nonproductive. Has not been vaccinated for COVID-19. Reports she occasionally feels lightheaded but is not currently experiencing any symptoms.      PAST MEDICAL HISTORY  Active Ambulatory Problems     Diagnosis Date Noted   • No Active Ambulatory Problems     Resolved Ambulatory Problems     Diagnosis Date Noted   • No Resolved Ambulatory Problems     Past Medical History:   Diagnosis Date   • Anxiety    • Panic attack          PAST SURGICAL HISTORY  No past surgical history on file.      FAMILY HISTORY  No family history on file.      SOCIAL HISTORY  Social History     Socioeconomic History   • Marital status: Single     Spouse name: Not on file   • Number of children: Not on file   • Years of education: Not on file   • Highest education level: Not on file   Tobacco Use   • Smoking status: Current Every Day Smoker     Packs/day: 1.50     Types: Cigarettes   • Smokeless tobacco: Never Used   Vaping Use   • Vaping Use: Never used   Substance and Sexual Activity   • Alcohol use: Yes     Comment: 4 mikes hard drinks on days not working, pt works 4 days a week   • Drug use: Yes     Frequency: 1.0 times per week     Comment: Ecstacy once a week   • Sexual activity: Defer          ALLERGIES  Patient has no known allergies.        REVIEW OF SYSTEMS  Review of Systems     All systems reviewed and negative except for those discussed in HPI.       PHYSICAL EXAM    I have reviewed the triage vital signs and nursing notes.    ED Triage Vitals   Temp Heart Rate Resp BP SpO2   10/01/21 2151 10/01/21 2151 10/01/21 2151 10/01/21 2355 10/01/21 2151   97.9 °F (36.6 °C) 65 18 148/100 98 %      Temp src Heart Rate Source Patient Position BP Location FiO2 (%)   -- -- -- 10/01/21 2355 --      Left arm        Physical Exam  GENERAL: not distressed  HENT: nares patent  EYES: no scleral icterus  CV: regular rhythm, regular rate  RESPIRATORY: normal effort clear to auscultation bilaterally  ABDOMEN: soft, nontender nondistended  MUSCULOSKELETAL: no deformity  NEURO: alert, moves all extremities, follows commands  SKIN: warm, dry        LAB RESULTS  Recent Results (from the past 24 hour(s))   ECG 12 Lead    Collection Time: 10/01/21  9:58 PM   Result Value Ref Range    QT Interval 423 ms   COVID-19,[facility] IN-HOUSE CEPHEID/SAM NP SWAB IN TRANSPORT MEDIA 8-12 HR TAT - Swab, Nasopharynx    Collection Time: 10/01/21 10:11 PM    Specimen: Nasopharynx; Swab   Result Value Ref Range    COVID19 Detected (C) Not Detected - Ref. Range   Comprehensive Metabolic Panel    Collection Time: 10/01/21 11:32 PM    Specimen: Blood   Result Value Ref Range    Glucose 87 65 - 99 mg/dL    BUN 12 6 - 20 mg/dL    Creatinine 0.54 (L) 0.57 - 1.00 mg/dL    Sodium 139 136 - 145 mmol/L    Potassium 3.9 3.5 - 5.2 mmol/L    Chloride 106 98 - 107 mmol/L    CO2 24.3 22.0 - 29.0 mmol/L    Calcium 9.4 8.6 - 10.5 mg/dL    Total Protein 7.5 6.0 - 8.5 g/dL    Albumin 4.10 3.50 - 5.20 g/dL    ALT (SGPT) 10 1 - 33 U/L    AST (SGOT) 14 1 - 32 U/L    Alkaline Phosphatase 78 39 - 117 U/L    Total Bilirubin 0.4 0.0 - 1.2 mg/dL    eGFR Non African Amer 131 >60 mL/min/1.73    Globulin 3.4 gm/dL    A/G Ratio 1.2 g/dL    BUN/Creatinine Ratio 22.2 7.0  - 25.0    Anion Gap 8.7 5.0 - 15.0 mmol/L   Troponin    Collection Time: 10/01/21 11:32 PM    Specimen: Blood   Result Value Ref Range    Troponin T <0.010 0.000 - 0.030 ng/mL   hCG, Serum, Qualitative    Collection Time: 10/01/21 11:32 PM    Specimen: Blood   Result Value Ref Range    HCG Qualitative Negative Negative   Green Top (Gel)    Collection Time: 10/01/21 11:32 PM   Result Value Ref Range    Extra Tube Hold for add-ons.    Lavender Top    Collection Time: 10/01/21 11:32 PM   Result Value Ref Range    Extra Tube hold for add-on    Light Blue Top    Collection Time: 10/01/21 11:32 PM   Result Value Ref Range    Extra Tube hold for add-on    CBC Auto Differential    Collection Time: 10/01/21 11:32 PM    Specimen: Blood   Result Value Ref Range    WBC 6.87 3.40 - 10.80 10*3/mm3    RBC 4.27 3.77 - 5.28 10*6/mm3    Hemoglobin 13.6 12.0 - 15.9 g/dL    Hematocrit 39.8 34.0 - 46.6 %    MCV 93.2 79.0 - 97.0 fL    MCH 31.9 26.6 - 33.0 pg    MCHC 34.2 31.5 - 35.7 g/dL    RDW 13.9 12.3 - 15.4 %    RDW-SD 47.3 37.0 - 54.0 fl    MPV 9.5 6.0 - 12.0 fL    Platelets 218 140 - 450 10*3/mm3    Neutrophil % 72.0 42.7 - 76.0 %    Lymphocyte % 15.4 (L) 19.6 - 45.3 %    Monocyte % 9.8 5.0 - 12.0 %    Eosinophil % 1.5 0.3 - 6.2 %    Basophil % 0.7 0.0 - 1.5 %    Immature Grans % 0.6 (H) 0.0 - 0.5 %    Neutrophils, Absolute 4.95 1.70 - 7.00 10*3/mm3    Lymphocytes, Absolute 1.06 0.70 - 3.10 10*3/mm3    Monocytes, Absolute 0.67 0.10 - 0.90 10*3/mm3    Eosinophils, Absolute 0.10 0.00 - 0.40 10*3/mm3    Basophils, Absolute 0.05 0.00 - 0.20 10*3/mm3    Immature Grans, Absolute 0.04 0.00 - 0.05 10*3/mm3    nRBC 0.0 0.0 - 0.2 /100 WBC   Troponin    Collection Time: 10/02/21  2:14 AM    Specimen: Blood   Result Value Ref Range    Troponin T <0.010 0.000 - 0.030 ng/mL       Ordered the above labs and independently reviewed the results.        RADIOLOGY  XR Chest 2 View    Result Date: 10/1/2021  PA AND LATERAL CHEST RADIOGRAPH  HISTORY:  Chest pain  COMPARISON: 09/04/2021  FINDINGS: Heart size is within normal limits. No pneumothorax or pleural effusion is seen. No acute infiltrate is identified. Subtle opacity is again seen at the left lung apex, medial to the left first rib. It appears smaller than on exams from June. It could represent an area of scarring. CT would be more sensitive for assessment. No new infiltrates are seen.      Subtle opacity again noted at the left lung apex.  This report was finalized on 10/1/2021 10:16 PM by Dr. Mirella Gtz M.D.      CT Angiogram Chest    Result Date: 10/2/2021  CT ANGIOGRAM OF THE CHEST  HISTORY: Pleuritic chest pain and history of COVID.  COMPARISON: None available.  TECHNIQUE: Axial CT imaging was obtained through the thorax. IV contrast was administered. Three-D reformatted images were obtained.  FINDINGS: No acute pulmonary thromboembolus is seen. The thoracic aorta is normal in caliber. There is no evidence of dissection. The thyroid gland, trachea, and esophagus are unremarkable. There is no pleural or pericardial effusion. There is a common origin of the brachiocephalic artery and left common carotid artery. Mediastinal lymph nodes do not appear pathologically enlarged. There are areas of consolidation which are noted at the lung apices bilaterally. Area at the left lung apex measures 2.3 x 1.2 cm. Area of the right lung apex measures up to 1.9 x 1.5 cm at its largest. These may represent areas of biapical scarring, but further assessment with head on a nonemergent basis is recommended. There is an additional 7 mm nodule within the right middle lobe, which is also indeterminate. It is below the sensitivity of the head, follow-up CT in 6 months is recommended. Nonspecific mosaic attenuation is seen within both lungs. Clinical significance is uncertain. Potentially, appearance may be related to the patient's COVID infection, although the appearance is not classic. Images through the upper  abdomen do not demonstrate any acute abnormalities. Prominence of the common bile duct, measuring up to 1.3 cm may be postcholecystectomy nature, but correlation with liver function tests is suggested. No acute osseous abnormalities are seen.       1. No acute pulmonary thromboembolus seen. 2. Nonspecific mosaic attenuation seen within both lungs. 3. Areas of consolidation are seen at the lung apices bilaterally. These may represent areas of scarring, but further assessment with PET, nonemergent basis is recommended. There is also a 7 mm nodule seen within the right middle lobe, and CT follow-up in 6 months is recommended.  Radiation dose reduction techniques were utilized, including automated exposure control and exposure modulation based on body size.  This report was finalized on 10/2/2021 1:18 AM by Dr. Mirella Gtz M.D.        I ordered the above noted radiological studies. Reviewed by me and discussed with radiologist.  See dictation for official radiology interpretation.      PROCEDURES    Procedures      MEDICATIONS GIVEN IN ER    Medications   morphine injection 2 mg (2 mg Intravenous Given 10/2/21 0018)   ondansetron (ZOFRAN) injection 4 mg (4 mg Intravenous Given 10/2/21 0018)   iopamidol (ISOVUE-370) 76 % injection 100 mL (85 mL Intravenous Given 10/2/21 0102)   hydrOXYzine (ATARAX) tablet 10 mg (10 mg Oral Given 10/2/21 0212)         PROGRESS, DATA ANALYSIS, CONSULTS, AND MEDICAL DECISION MAKING    All labs have been independently reviewed by me.  All radiology studies have been reviewed by me and discussed with radiologist dictating the report.   EKG's independently viewed and interpreted by me.  Discussion below represents my analysis of pertinent findings related to patient's condition, differential diagnosis, treatment plan and final disposition.                 PPE: Patient wore surgical mask throughout clinical encounter. I wore N95 mask, gloves, gown, and protective goggles.    AS OF 06:34  EDT VITALS:    BP - 144/86  HR - 64  TEMP - 98.4 °F (36.9 °C) (Tympanic)  O2 SATS - 99%        DIAGNOSIS  Final diagnoses:   COVID-19         DISPOSITION  Discharge           Dawson Ballesteros MD  10/02/21 0634

## 2021-10-02 NOTE — ED NOTES
Pt was sent from work at [employer] for chest pain and dizziness. Pt reports that the pain is sharp in nature and started last night at work. Denies cardiac history. The pain radiates around the ribs and to the back.      Humera Michele, RN  10/01/21 5080

## 2021-10-03 ENCOUNTER — HOSPITAL ENCOUNTER (EMERGENCY)
Facility: HOSPITAL | Age: 31
Discharge: HOME | End: 2021-10-03
Attending: EMERGENCY MEDICINE | Admitting: EMERGENCY MEDICINE

## 2021-10-03 VITALS
DIASTOLIC BLOOD PRESSURE: 95 MMHG | HEART RATE: 64 BPM | SYSTOLIC BLOOD PRESSURE: 136 MMHG | RESPIRATION RATE: 16 BRPM | TEMPERATURE: 97.3 F | OXYGEN SATURATION: 98 %

## 2021-10-03 DIAGNOSIS — F41.9 ANXIETY: ICD-10-CM

## 2021-10-03 DIAGNOSIS — T50.901A ACCIDENTAL MEDICATION ERROR, INITIAL ENCOUNTER: Primary | ICD-10-CM

## 2021-10-03 PROCEDURE — 99283 EMERGENCY DEPT VISIT LOW MDM: CPT

## 2021-10-03 RX ORDER — LORAZEPAM 1 MG/1
1 TABLET ORAL ONCE
Status: COMPLETED | OUTPATIENT
Start: 2021-10-03 | End: 2021-10-03

## 2021-10-03 RX ADMIN — LORAZEPAM 1 MG: 1 TABLET ORAL at 10:42

## 2021-10-03 NOTE — DISCHARGE INSTRUCTIONS
Continue current home medications, do not take your BuSpar until tomorrow  Rest, increase fluids   Follow-up with PMD as needed  Return to the ER for fever, chills, dizziness, weakness, shortness of breath, chest pain or any new or worsening symptoms

## 2021-10-03 NOTE — ED PROVIDER NOTES
EMERGENCY DEPARTMENT ENCOUNTER    Room Number:  08/08  Date of encounter:  10/3/2021  PCP: Mary Huber APRN  Historian: Patient      PPE    Patient was placed in face mask in first look. Patient was wearing facemask when I entered the room and throughout our encounter. I wore full protective equipment throughout this patient encounter including a CAPR face mask, eye shield, gown and gloves. Hand hygiene was performed before donning protective equipment and after removal when leaving the room.        HPI:  Chief Complaint: Medication error, anxiety  A complete HPI/ROS/PMH/PSH/SH/FH are unobtainable due to: Nothing    Context: Alejo Pascal is a 31 y.o. female who arrives to the ED via private vehicle from home.  Patient states that she tested positive for COVID-19 on October 1.  She states that since that time she has felt dizzy, not been able to sleep.  She states this morning at 830 she thought she was taking her propanolol but then noticed that she had actually taken her BuSpar.  She states she normally takes her BuSpar at 12.  She became anxious, and decided she should come to the ER.  Her only complaint at this time is that she still feels very anxious.  She does deny fever, chills, chest pain, shortness of breath.      PAST MEDICAL HISTORY  Active Ambulatory Problems     Diagnosis Date Noted   • No Active Ambulatory Problems     Resolved Ambulatory Problems     Diagnosis Date Noted   • No Resolved Ambulatory Problems     Past Medical History:   Diagnosis Date   • Anxiety    • Panic attack          PAST SURGICAL HISTORY  No past surgical history on file.      FAMILY HISTORY  No family history on file.      SOCIAL HISTORY  Social History     Socioeconomic History   • Marital status: Single     Spouse name: Not on file   • Number of children: Not on file   • Years of education: Not on file   • Highest education level: Not on file   Tobacco Use   • Smoking status: Current Every Day Smoker     Packs/day: 1.50      Types: Cigarettes   • Smokeless tobacco: Never Used   Vaping Use   • Vaping Use: Never used   Substance and Sexual Activity   • Alcohol use: Yes     Comment: 4 mikes hard drinks on days not working, pt works 4 days a week   • Drug use: Yes     Frequency: 1.0 times per week     Comment: Ecstacy once a week   • Sexual activity: Defer         ALLERGIES  Patient has no known allergies.        REVIEW OF SYSTEMS  Review of Systems     All systems reviewed and negative except for those discussed in HPI.        PHYSICAL EXAM    ED Triage Vitals   Temp Heart Rate Resp BP SpO2   10/03/21 0923 10/03/21 0923 10/03/21 0923 10/03/21 0931 10/03/21 0923   97.3 °F (36.3 °C) 94 16 (!) 152/117 100 %       Physical Exam  GENERAL: Well appearing, non-toxic appearing, not distressed  HENT: normocephalic, atraumatic  EYES: no scleral icterus, PERRL  CV: regular rhythm, regular rate, no murmur  RESPIRATORY: normal effort, CTAB  ABDOMEN: sof  ,MUSCULOSKELETAL: no deformity  NEURO: alert, moves all extremities, follows commands, mental status normal/baseline  SKIN: warm, dry, no rash   Psych: Appropriate mood and affect  Nursing notes and vital signs reviewed      LAB RESULTS  No results found for this or any previous visit (from the past 24 hour(s)).    Ordered the above labs and independently reviewed the results.      RADIOLOGY  No Radiology Exams Resulted Within Past 24 Hours    I ordered the above noted radiological studies and viewed the images on the PACS system.         MEDICAL RECORD REVIEW  Records reviewed in [facility], patient was seen here yesterday with test positive for COVID-19.      PROCEDURES    Procedures        PROGRESS, DATA ANALYSIS, CONSULTS, AND MEDICAL DECISION MAKING        ED Course as of Oct 03 1442   Sun Oct 03, 2021   1036 Discussed with patient that she should just not take her BuSpar until tomorrow that taking it early should have no ill side effects.  We will give her an Ativan p.o. while here to help with her  anxiety.  Discussed with her that she should follow-up with her PMD for any further medications.  She verbalized understanding and is agreeable to this plan.    [MS]   1120 Reviewed pt's history and workup with Dr. Olivas.  After a bedside evaluation, they agree with the plan of care.          [MS]      ED Course User Index  [MS] Arelis Loomis APRN     Discussed plan for discharge, as there is no emergent indication for admission. Pt/family is agreeable and understands need for follow up and repeat testing.  Pt is aware that discharge does not mean that nothing is wrong but it indicates no emergency is present that requires admission and they must continue care with follow-up as given below or physician of their choice.   Patient/Family voiced understanding of above instructions.  Patient discharged in stable condition.    DIAGNOSIS  Final diagnoses:   Accidental medication error, initial encounter   Anxiety       FOLLOW UP   Mary Huber, APRN  [address]  [facility] [address]  [phone]    Schedule an appointment as soon as possible for a visit   If symptoms worsen      RX     Medication List      No changes were made to your prescriptions during this visit.           MEDICATIONS GIVEN IN ED    Medications   LORazepam (ATIVAN) tablet 1 mg (1 mg Oral Given 10/3/21 1042)           COURSE & MEDICAL DECISION MAKING  Any/All labs and Any/All Imaging studies that were ordered were reviewed and are noted above.  Results were reviewed/discussed with the patient and they were also made aware of online access.    Pt also made aware that some labs, such as cultures, will not be resulted during ER visit and follow up with PMD is necessary.        Arelis Loomis APRN  10/03/21 144

## 2021-10-03 NOTE — ED TRIAGE NOTES
"Pt reports she tested positive for covid on 10/1/21. Pt reports she accidentally took 15mg of Buspar this morning, but it wasn't due until noon. Pt states she was lying in the bed and states \"I couldn't breath\". Pt appears in no distress at this time.     Pt was wearing a mask during assessment.  This RN wore appropriate PPE     "

## 2021-10-03 NOTE — ED PROVIDER NOTES
MD ATTESTATION NOTE    The MONO and I have discussed this patient's history, physical exam, and treatment plan.  I have reviewed the documentation and personally had a face to face interaction with the patient. I affirm the documentation and agree with the treatment and plan.  The attached note describes my personal findings.      Alejo Pascal is a 31 y.o. female who presents to the ED c/o accidentally taking her BuSpar early.  She reports that she woke up this morning and reached for her medicine.  She normally takes propranolol first thing in the morning.  She states she excellently picked up her BuSpar and took 1.  She states that normally she takes that at noon rather than at 8:00 in the morning.  She takes it at noon and midnight.  She states this caused her to have a panic attack.  She states she felt very anxious.  She now feels better.  She had no suicidal thoughts.  She tested positive for Covid 2 days ago.  She reports some mild fatigue but no other complaints.      On exam:  GENERAL: Awake, alert, no acute distress  SKIN: Warm, dry  HENT: Normocephalic, atraumatic  EYES: no scleral icterus  CV: regular rhythm, regular rate  RESPIRATORY: normal effort, lungs clear  ABDOMEN: soft, non-tender, non-distended  MUSCULOSKELETAL: no deformity  NEURO: alert, moves all extremities, follows commands    Labs  No results found for this or any previous visit (from the past 24 hour(s)).    Radiology  No Radiology Exams Resulted Within Past 24 Hours    Medical Decision Making:  ED Course as of Oct 03 1134   Sun Oct 03, 2021   1036 Discussed with patient that she should just not take her BuSpar until tomorrow that taking it early should have no ill side effects.  We will give her an Ativan p.o. while here to help with her anxiety.  Discussed with her that she should follow-up with her PMD for any further medications.  She verbalized understanding and is agreeable to this plan.    [MS]      ED Course User Index  [MS]  Arelis Loomis, MARIANO       We have advised her to not take her BuSpar until midnight tonight.  I reminded her that poison control is always available for consultation.  She is stable from a COVID-19 standpoint.    PPE: The patient wore a mask and I wore a CAPR mask throughout the entire patient encounter.  I wore a gown.    The patient qualifies to receive the vaccine, but they have not yet received it.    Diagnosis  Final diagnoses:   None        James Olivas MD  10/03/21 1139

## 2021-10-04 ENCOUNTER — HOSPITAL ENCOUNTER (EMERGENCY)
Facility: HOSPITAL | Age: 31
Discharge: HOME | End: 2021-10-04
Attending: EMERGENCY MEDICINE | Admitting: EMERGENCY MEDICINE

## 2021-10-04 ENCOUNTER — APPOINTMENT (OUTPATIENT)
Dept: GENERAL RADIOLOGY | Facility: HOSPITAL | Age: 31
End: 2021-10-04

## 2021-10-04 VITALS
SYSTOLIC BLOOD PRESSURE: 140 MMHG | HEART RATE: 72 BPM | DIASTOLIC BLOOD PRESSURE: 90 MMHG | HEIGHT: 66 IN | BODY MASS INDEX: 30.53 KG/M2 | OXYGEN SATURATION: 100 % | TEMPERATURE: 98.4 F | WEIGHT: 190 LBS | RESPIRATION RATE: 16 BRPM

## 2021-10-04 DIAGNOSIS — U07.1 COVID-19 VIRUS INFECTION: Primary | ICD-10-CM

## 2021-10-04 LAB
ALBUMIN SERPL-MCNC: 4.3 G/DL (ref 3.5–5.2)
ALBUMIN/GLOB SERPL: 1.2 G/DL
ALP SERPL-CCNC: 81 U/L (ref 39–117)
ALT SERPL W P-5'-P-CCNC: 9 U/L (ref 1–33)
ANION GAP SERPL CALCULATED.3IONS-SCNC: 10.1 MMOL/L (ref 5–15)
AST SERPL-CCNC: 14 U/L (ref 1–32)
BASOPHILS # BLD AUTO: 0.05 10*3/MM3 (ref 0–0.2)
BASOPHILS NFR BLD AUTO: 0.6 % (ref 0–1.5)
BILIRUB SERPL-MCNC: 0.3 MG/DL (ref 0–1.2)
BUN SERPL-MCNC: 8 MG/DL (ref 6–20)
BUN/CREAT SERPL: 13.3 (ref 7–25)
CALCIUM SPEC-SCNC: 9.3 MG/DL (ref 8.6–10.5)
CHLORIDE SERPL-SCNC: 103 MMOL/L (ref 98–107)
CO2 SERPL-SCNC: 25.9 MMOL/L (ref 22–29)
CREAT SERPL-MCNC: 0.6 MG/DL (ref 0.57–1)
D DIMER PPP FEU-MCNC: 0.39 MCGFEU/ML (ref 0–0.49)
DEPRECATED RDW RBC AUTO: 46.4 FL (ref 37–54)
EOSINOPHIL # BLD AUTO: 0.04 10*3/MM3 (ref 0–0.4)
EOSINOPHIL NFR BLD AUTO: 0.5 % (ref 0.3–6.2)
ERYTHROCYTE [DISTWIDTH] IN BLOOD BY AUTOMATED COUNT: 13.6 % (ref 12.3–15.4)
GFR SERPL CREATININE-BSD FRML MDRD: 116 ML/MIN/1.73
GLOBULIN UR ELPH-MCNC: 3.5 GM/DL
GLUCOSE SERPL-MCNC: 93 MG/DL (ref 65–99)
HCT VFR BLD AUTO: 41.3 % (ref 34–46.6)
HGB BLD-MCNC: 14.2 G/DL (ref 12–15.9)
IMM GRANULOCYTES # BLD AUTO: 0.04 10*3/MM3 (ref 0–0.05)
IMM GRANULOCYTES NFR BLD AUTO: 0.5 % (ref 0–0.5)
LYMPHOCYTES # BLD AUTO: 0.74 10*3/MM3 (ref 0.7–3.1)
LYMPHOCYTES NFR BLD AUTO: 9.5 % (ref 19.6–45.3)
MCH RBC QN AUTO: 32.1 PG (ref 26.6–33)
MCHC RBC AUTO-ENTMCNC: 34.4 G/DL (ref 31.5–35.7)
MCV RBC AUTO: 93.4 FL (ref 79–97)
MONOCYTES # BLD AUTO: 0.86 10*3/MM3 (ref 0.1–0.9)
MONOCYTES NFR BLD AUTO: 11.1 % (ref 5–12)
NEUTROPHILS NFR BLD AUTO: 6.04 10*3/MM3 (ref 1.7–7)
NEUTROPHILS NFR BLD AUTO: 77.8 % (ref 42.7–76)
NRBC BLD AUTO-RTO: 0 /100 WBC (ref 0–0.2)
PLATELET # BLD AUTO: 217 10*3/MM3 (ref 140–450)
PMV BLD AUTO: 9.4 FL (ref 6–12)
POTASSIUM SERPL-SCNC: 3.3 MMOL/L (ref 3.5–5.2)
PROT SERPL-MCNC: 7.8 G/DL (ref 6–8.5)
RBC # BLD AUTO: 4.42 10*6/MM3 (ref 3.77–5.28)
SODIUM SERPL-SCNC: 139 MMOL/L (ref 136–145)
WBC # BLD AUTO: 7.77 10*3/MM3 (ref 3.4–10.8)

## 2021-10-04 PROCEDURE — 71045 X-RAY EXAM CHEST 1 VIEW: CPT

## 2021-10-04 PROCEDURE — 85379 FIBRIN DEGRADATION QUANT: CPT | Performed by: PHYSICIAN ASSISTANT

## 2021-10-04 PROCEDURE — 99283 EMERGENCY DEPT VISIT LOW MDM: CPT

## 2021-10-04 PROCEDURE — 80053 COMPREHEN METABOLIC PANEL: CPT | Performed by: PHYSICIAN ASSISTANT

## 2021-10-04 PROCEDURE — 85025 COMPLETE CBC W/AUTO DIFF WBC: CPT | Performed by: PHYSICIAN ASSISTANT

## 2021-10-04 RX ORDER — BUSPIRONE HYDROCHLORIDE 15 MG/1
15 TABLET ORAL 2 TIMES DAILY
COMMUNITY

## 2021-10-04 RX ADMIN — SODIUM CHLORIDE, POTASSIUM CHLORIDE, SODIUM LACTATE AND CALCIUM CHLORIDE 1000 ML: 600; 310; 30; 20 INJECTION, SOLUTION INTRAVENOUS at 21:34

## 2021-10-04 NOTE — ED NOTES
This RN in appropriate ppe while in pt room. Pt wearing mask.        Lolita Hawthorne, RN  10/04/21 1912

## 2021-10-04 NOTE — ED NOTES
Pt presents to ED with complaints of COVID + on 10/1/2021. Pt complaints of SOA, cough, body aches and fatigue. Pt is A&OIX4, able to ambulate, and in a mask at this time.     Patient was placed in face mask during first look triage.  Patient was wearing a face mask throughout encounter.  I wore personal protective equipment throughout the encounter.  Hand hygiene was performed before and after patient encounter.       Juliana Holguin, RN  10/04/21 9933

## 2021-10-05 NOTE — ED PROVIDER NOTES
EMERGENCY DEPARTMENT ENCOUNTER    Room Number:  13/13  Date of encounter:  10/5/2021  PCP: Mary Huber APRN  Historian: Patient      HPI:  Chief Complaint: COVID, generalized weakness, shortness of breath  A complete HPI/ROS/PMH/PSH/SH/FH are unobtainable due to: Nothing    Context: Alejo Pascal is a 31 y.o. female who presents to the ED c/o COVID-19, generalized weakness, shortness of breath.  Patient states she began to have symptoms approximately a week ago.  She was formally diagnosed COVID-19 on 10/1/2021 in this emergency department.  Since that time she states she has been feeling weak, fatigue, loss of appetite, and think she might be somewhat dehydrated.  She also states she has experienced slightly worse shortness of breath with exertion.  She denies associated chest pain, fever, vomiting, but does endorse chills and loose stool.  She is here for further evaluation.    Reviewing the patient's chart she is not diabetic and/or immunocompromised.  She does not have a history of asthma.  She is a smoker.    PAST MEDICAL HISTORY  Active Ambulatory Problems     Diagnosis Date Noted   • No Active Ambulatory Problems     Resolved Ambulatory Problems     Diagnosis Date Noted   • No Resolved Ambulatory Problems     Past Medical History:   Diagnosis Date   • Anxiety    • Panic attack          PAST SURGICAL HISTORY  History reviewed. No pertinent surgical history.      FAMILY HISTORY  History reviewed. No pertinent family history.      SOCIAL HISTORY  Social History     Socioeconomic History   • Marital status: Single     Spouse name: Not on file   • Number of children: Not on file   • Years of education: Not on file   • Highest education level: Not on file   Tobacco Use   • Smoking status: Current Every Day Smoker     Packs/day: 1.50     Types: Cigarettes   • Smokeless tobacco: Never Used   Vaping Use   • Vaping Use: Never used   Substance and Sexual Activity   • Alcohol use: Yes     Comment: 4 mikes hard  drinks on days not working, pt works 4 days a week   • Drug use: Yes     Frequency: 1.0 times per week     Comment: Ecstacy once a week   • Sexual activity: Defer         ALLERGIES  Patient has no known allergies.        REVIEW OF SYSTEMS  Review of Systems   Constitutional: Positive for chills. Negative for fever.   HENT: Positive for congestion.    Respiratory: Positive for cough and shortness of breath.    Cardiovascular: Negative for chest pain, palpitations and leg swelling.   Gastrointestinal: Positive for nausea. Negative for vomiting.   Genitourinary: Negative.    Musculoskeletal: Negative.    Skin: Negative.    Neurological: Negative.    Psychiatric/Behavioral: Negative.         All systems reviewed and negative except for those discussed in HPI.       PHYSICAL EXAM    I have reviewed the triage vital signs and nursing notes.    ED Triage Vitals   Temp Heart Rate Resp BP SpO2   10/04/21 1830 10/04/21 1830 10/04/21 1830 10/04/21 1908 10/04/21 1830   98.4 °F (36.9 °C) 112 18 145/93 97 %      Temp src Heart Rate Source Patient Position BP Location FiO2 (%)   10/04/21 1830 10/04/21 1830 10/04/21 1908 10/04/21 1908 --   Tympanic Monitor Sitting Left arm        Physical Exam  GENERAL: WDWN female no acute distress  HENT: nares patent, NCAT, mucous memories moist  Neck: No nuchal rigidity  EYES: no scleral icterus  CV: regular rhythm, regular rate, no rubs or gallops  RESPIRATORY: normal effort, lungs CTA B  ABDOMEN: soft, nontender  MUSCULOSKELETAL: no deformity  NEURO: alert and oriented x4, moves all extremities, follows commands  SKIN: warm, dry no viral exanthem        LAB RESULTS  Recent Results (from the past 24 hour(s))   Comprehensive Metabolic Panel    Collection Time: 10/04/21  7:12 PM    Specimen: Blood   Result Value Ref Range    Glucose 93 65 - 99 mg/dL    BUN 8 6 - 20 mg/dL    Creatinine 0.60 0.57 - 1.00 mg/dL    Sodium 139 136 - 145 mmol/L    Potassium 3.3 (L) 3.5 - 5.2 mmol/L    Chloride 103 98 -  107 mmol/L    CO2 25.9 22.0 - 29.0 mmol/L    Calcium 9.3 8.6 - 10.5 mg/dL    Total Protein 7.8 6.0 - 8.5 g/dL    Albumin 4.30 3.50 - 5.20 g/dL    ALT (SGPT) 9 1 - 33 U/L    AST (SGOT) 14 1 - 32 U/L    Alkaline Phosphatase 81 39 - 117 U/L    Total Bilirubin 0.3 0.0 - 1.2 mg/dL    eGFR Non African Amer 116 >60 mL/min/1.73    Globulin 3.5 gm/dL    A/G Ratio 1.2 g/dL    BUN/Creatinine Ratio 13.3 7.0 - 25.0    Anion Gap 10.1 5.0 - 15.0 mmol/L   D-dimer, Quantitative    Collection Time: 10/04/21  7:12 PM    Specimen: Blood   Result Value Ref Range    D-Dimer, Quantitative 0.39 0.00 - 0.49 MCGFEU/mL   CBC Auto Differential    Collection Time: 10/04/21  7:12 PM    Specimen: Blood   Result Value Ref Range    WBC 7.77 3.40 - 10.80 10*3/mm3    RBC 4.42 3.77 - 5.28 10*6/mm3    Hemoglobin 14.2 12.0 - 15.9 g/dL    Hematocrit 41.3 34.0 - 46.6 %    MCV 93.4 79.0 - 97.0 fL    MCH 32.1 26.6 - 33.0 pg    MCHC 34.4 31.5 - 35.7 g/dL    RDW 13.6 12.3 - 15.4 %    RDW-SD 46.4 37.0 - 54.0 fl    MPV 9.4 6.0 - 12.0 fL    Platelets 217 140 - 450 10*3/mm3    Neutrophil % 77.8 (H) 42.7 - 76.0 %    Lymphocyte % 9.5 (L) 19.6 - 45.3 %    Monocyte % 11.1 5.0 - 12.0 %    Eosinophil % 0.5 0.3 - 6.2 %    Basophil % 0.6 0.0 - 1.5 %    Immature Grans % 0.5 0.0 - 0.5 %    Neutrophils, Absolute 6.04 1.70 - 7.00 10*3/mm3    Lymphocytes, Absolute 0.74 0.70 - 3.10 10*3/mm3    Monocytes, Absolute 0.86 0.10 - 0.90 10*3/mm3    Eosinophils, Absolute 0.04 0.00 - 0.40 10*3/mm3    Basophils, Absolute 0.05 0.00 - 0.20 10*3/mm3    Immature Grans, Absolute 0.04 0.00 - 0.05 10*3/mm3    nRBC 0.0 0.0 - 0.2 /100 WBC       Ordered the above labs and independently reviewed the results.        RADIOLOGY  XR Chest 1 View    Result Date: 10/4/2021  PORTABLE CHEST  HISTORY: Shortness of air, Covid, weakness.  FINDINGS: A single portable view of the chest demonstrates the heart to be within normal limits in size. There is no evidence of consolidation, effusion or of congestive  failure. There is mild interstitial prominence involving the lungs laterally bilaterally which is new versus 10/01/2021.        I ordered the above noted radiological studies. Reviewed by me and discussed with radiologist.  See dictation for official radiology interpretation.      PROCEDURES    Procedures      MEDICATIONS GIVEN IN ER    Medications   lactated ringers bolus 1,000 mL (0 mL Intravenous Stopped 10/4/21 2240)         PROGRESS, DATA ANALYSIS, CONSULTS, AND MEDICAL DECISION MAKING    All labs have been independently reviewed by me.  All radiology studies have been reviewed by me and discussed with radiologist dictating the report.   EKG's independently viewed and interpreted by me.  Discussion below represents my analysis of pertinent findings related to patient's condition, differential diagnosis, treatment plan and final disposition.    DDx includes but is not limited to: COVID-19, COVID-19 pneumonia, community-acquired pneumonia.  Further evaluate the patient I will obtain a CBC, CMP, D-dimer chest x-ray.  Please see below for MDM course of care    ED Course as of Oct 05 0601   Mon Oct 04, 2021   2035 D-Dimer, Quant: 0.39 [JR]   2035 Potassium(!): 3.3 [JR]   2035 Creatinine: 0.60 [JR]   2035 WBC: 7.77 [JR]   2035 Hemoglobin: 14.2 [JR]   2035 SpO2: 97 % [JR]   2035 Device (Oxygen Therapy): room air [JR]   2127 Chest x-ray is viewed by me shows questionable bilateral developing infiltrates    [RC]   2128 WBC: 7.77 [RC]   2128 Hemoglobin: 14.2 [RC]   2128 Hematocrit: 41.3 [RC]   2128 RBC: 4.42 [RC]   2128 Platelets: 217 [RC]   2128 Glucose: 93 [RC]   2128 BUN: 8 [RC]   2128 Creatinine: 0.60 [RC]   2128 Sodium: 139 [RC]   2128 Potassium(!): 3.3 [RC]   2128 CO2: 25.9 [RC]   2128 Anion Gap: 10.1 [RC]   2128 D-Dimer, Quant: 0.39 [RC]   2128 Patient is well-appearing on exam.  She is describing cramping and states she feels she is somewhat dehydrated.  Plan at this point would be to rehydrate with a liter of LR  patient reassurance.    [RC]      ED Course User Index  [JR] Quinton Gardner MD  [RC] Poli Hayden III, PA       Patient was placed in face mask in first look. Patient was wearing facemask when I entered the room and throughout our encounter. I wore full protective equipment throughout this patient encounter including a face mask, and gloves. Hand hygiene was performed before donning protective equipment and after removal when leaving the room.    AS OF 06:01 EDT VITALS:    BP - 140/90  HR - 72  TEMP - 98.4 °F (36.9 °C) (Tympanic)  O2 SATS - 100%        DIAGNOSIS  Final diagnoses:   COVID-19 virus infection         DISPOSITION  DISCHARGE    Patient discharged in stable condition.    Reviewed implications of results, diagnosis, meds, responsibility to follow up, warning signs and symptoms of possible worsening, potential complications and reasons to return to ER.    Patient/Family voiced understanding of above instructions.    Discussed plan for discharge, as there is no emergent indication for admission. Patient referred to primary care provider for BP management due to today's BP. Pt/family is agreeable and understands need for follow up and repeat testing.  Pt is aware that discharge does not mean that nothing is wrong but it indicates no emergency is present that requires admission and they must continue care with follow-up as given below or physician of their choice.     FOLLOW-UP  Mary Huber, APRN  [address], [facility]. [address] [place] [address] [id number] [phone]      As needed         Medication List      No changes were made to your prescriptions during this visit.                Poli Hayden III, PA  10/05/21 0601

## 2021-10-05 NOTE — ED PROVIDER NOTES
Pt presents to the ED c/o  generalized weakness, mild shortness of breath.  She reports poor p.o. intake.  She was diagnosed with COVID-19 on 10/1/2021, symptoms started the day before.  She is concerned she may be dehydrated.  She denies chest pain.     On exam,   Awake alert, no acute distress  Normal work of breathing  Cranial nerves II to XII grossly intact     Plan: Patient is not hypoxic.  She is appropriate discharge home.  She was given IV fluids prior to discharge.  She was provided with a home pulse ox meter and advised to return to the ER should she develop worsening shortness of air or low oxygen level.      I wore an N95 mask, face shield, and gloves during this patient encounter.  Patient also wearing a surgical mask.  Hand hygeine performed before and after seeing the patient.     Attestation:  The MONO and I have discussed this patient's history, physical exam, and treatment plan.  I have reviewed the documentation and personally had a face to face interaction with the patient. I affirm the documentation and agree with the treatment and plan.  The attached note describes my personal findings.            Quinton Gardner MD  10/04/21 [id number]

## 2021-10-05 NOTE — ED NOTES
Patient was placed in face mask in first look. Patient was wearing facemask when I entered the room and throughout our encounter. I wore full protective equipment throughout this patient encounter including a face mask, and gloves. Hand hygiene was performed before donning protective equipment and after removal when leaving the room.     Ericka Mishra, RN  10/04/21 2034

## 2021-10-07 ENCOUNTER — HOSPITAL ENCOUNTER (EMERGENCY)
Facility: HOSPITAL | Age: 31
Discharge: LEFT BEFORE DISCHARGE | End: 2021-10-07

## 2021-10-07 VITALS
DIASTOLIC BLOOD PRESSURE: 103 MMHG | SYSTOLIC BLOOD PRESSURE: 137 MMHG | TEMPERATURE: 97.5 F | RESPIRATION RATE: 18 BRPM | OXYGEN SATURATION: 99 % | HEART RATE: 111 BPM

## 2021-10-07 PROCEDURE — 99211 OFF/OP EST MAY X REQ PHY/QHP: CPT

## 2021-10-07 NOTE — ED TRIAGE NOTES
To ER via PV.  Pt states she had rapid HR at home with CP.   upon arrival.  Pt states she feels hot and sweaty with rapid HR.  Pt also c/o chest pain with inspiration.  PT states symptoms started 1 hour ago.  Pt tested positive for covid on 10/1.  This is pt's 4th visit since the first.      Pt in mask at time of triage.  Triage staff in appropriate PPE.

## 2022-03-04 ENCOUNTER — APPOINTMENT (OUTPATIENT)
Dept: GENERAL RADIOLOGY | Facility: HOSPITAL | Age: 32
End: 2022-03-04

## 2022-03-04 ENCOUNTER — HOSPITAL ENCOUNTER (EMERGENCY)
Facility: HOSPITAL | Age: 32
Discharge: HOME | End: 2022-03-05
Attending: EMERGENCY MEDICINE | Admitting: EMERGENCY MEDICINE

## 2022-03-04 DIAGNOSIS — B34.9 VIRAL SYNDROME: Primary | ICD-10-CM

## 2022-03-04 LAB
BASOPHILS # BLD AUTO: 0.08 10*3/MM3 (ref 0–0.2)
BASOPHILS NFR BLD AUTO: 1.1 % (ref 0–1.5)
DEPRECATED RDW RBC AUTO: 44.8 FL (ref 37–54)
EOSINOPHIL # BLD AUTO: 0.24 10*3/MM3 (ref 0–0.4)
EOSINOPHIL NFR BLD AUTO: 3.2 % (ref 0.3–6.2)
ERYTHROCYTE [DISTWIDTH] IN BLOOD BY AUTOMATED COUNT: 13.5 % (ref 12.3–15.4)
HCT VFR BLD AUTO: 42.1 % (ref 34–46.6)
HGB BLD-MCNC: 13.8 G/DL (ref 12–15.9)
IMM GRANULOCYTES # BLD AUTO: 0.03 10*3/MM3 (ref 0–0.05)
IMM GRANULOCYTES NFR BLD AUTO: 0.4 % (ref 0–0.5)
LYMPHOCYTES # BLD AUTO: 1.15 10*3/MM3 (ref 0.7–3.1)
LYMPHOCYTES NFR BLD AUTO: 15.4 % (ref 19.6–45.3)
MCH RBC QN AUTO: 29.8 PG (ref 26.6–33)
MCHC RBC AUTO-ENTMCNC: 32.8 G/DL (ref 31.5–35.7)
MCV RBC AUTO: 90.9 FL (ref 79–97)
MONOCYTES # BLD AUTO: 0.96 10*3/MM3 (ref 0.1–0.9)
MONOCYTES NFR BLD AUTO: 12.9 % (ref 5–12)
NEUTROPHILS NFR BLD AUTO: 5.01 10*3/MM3 (ref 1.7–7)
NEUTROPHILS NFR BLD AUTO: 67 % (ref 42.7–76)
NRBC BLD AUTO-RTO: 0 /100 WBC (ref 0–0.2)
PLATELET # BLD AUTO: 240 10*3/MM3 (ref 140–450)
PMV BLD AUTO: 10 FL (ref 6–12)
RBC # BLD AUTO: 4.63 10*6/MM3 (ref 3.77–5.28)
WBC NRBC COR # BLD: 7.47 10*3/MM3 (ref 3.4–10.8)

## 2022-03-04 PROCEDURE — 71045 X-RAY EXAM CHEST 1 VIEW: CPT

## 2022-03-04 PROCEDURE — 0202U NFCT DS 22 TRGT SARS-COV-2: CPT | Performed by: PHYSICIAN ASSISTANT

## 2022-03-04 PROCEDURE — 83880 ASSAY OF NATRIURETIC PEPTIDE: CPT | Performed by: PHYSICIAN ASSISTANT

## 2022-03-04 PROCEDURE — 85025 COMPLETE CBC W/AUTO DIFF WBC: CPT | Performed by: PHYSICIAN ASSISTANT

## 2022-03-04 PROCEDURE — 84145 PROCALCITONIN (PCT): CPT | Performed by: PHYSICIAN ASSISTANT

## 2022-03-04 PROCEDURE — 99283 EMERGENCY DEPT VISIT LOW MDM: CPT

## 2022-03-04 PROCEDURE — 93005 ELECTROCARDIOGRAM TRACING: CPT | Performed by: PHYSICIAN ASSISTANT

## 2022-03-04 PROCEDURE — 80053 COMPREHEN METABOLIC PANEL: CPT | Performed by: PHYSICIAN ASSISTANT

## 2022-03-04 PROCEDURE — 93010 ELECTROCARDIOGRAM REPORT: CPT | Performed by: INTERNAL MEDICINE

## 2022-03-04 PROCEDURE — 84484 ASSAY OF TROPONIN QUANT: CPT | Performed by: PHYSICIAN ASSISTANT

## 2022-03-04 RX ORDER — SODIUM CHLORIDE 0.9 % (FLUSH) 0.9 %
10 SYRINGE (ML) INJECTION AS NEEDED
Status: DISCONTINUED | OUTPATIENT
Start: 2022-03-04 | End: 2022-03-05 | Stop reason: HOSPADM

## 2022-03-05 VITALS
OXYGEN SATURATION: 92 % | TEMPERATURE: 97.7 F | HEIGHT: 66 IN | SYSTOLIC BLOOD PRESSURE: 106 MMHG | HEART RATE: 56 BPM | DIASTOLIC BLOOD PRESSURE: 67 MMHG | BODY MASS INDEX: 34.55 KG/M2 | RESPIRATION RATE: 18 BRPM | WEIGHT: 215 LBS

## 2022-03-05 LAB
ALBUMIN SERPL-MCNC: 3.8 G/DL (ref 3.5–5.2)
ALBUMIN/GLOB SERPL: 1.1 G/DL
ALP SERPL-CCNC: 86 U/L (ref 39–117)
ALT SERPL W P-5'-P-CCNC: 11 U/L (ref 1–33)
ANION GAP SERPL CALCULATED.3IONS-SCNC: 10.7 MMOL/L (ref 5–15)
AST SERPL-CCNC: 14 U/L (ref 1–32)
B PARAPERT DNA SPEC QL NAA+PROBE: NOT DETECTED
B PERT DNA SPEC QL NAA+PROBE: NOT DETECTED
BACTERIA UR QL AUTO: ABNORMAL /HPF
BILIRUB SERPL-MCNC: <0.2 MG/DL (ref 0–1.2)
BILIRUB UR QL STRIP: NEGATIVE
BUN SERPL-MCNC: 20 MG/DL (ref 6–20)
BUN/CREAT SERPL: 20.8 (ref 7–25)
C PNEUM DNA NPH QL NAA+NON-PROBE: NOT DETECTED
CALCIUM SPEC-SCNC: 9.1 MG/DL (ref 8.6–10.5)
CHLORIDE SERPL-SCNC: 105 MMOL/L (ref 98–107)
CLARITY UR: ABNORMAL
CO2 SERPL-SCNC: 25.3 MMOL/L (ref 22–29)
COLOR UR: ABNORMAL
CREAT SERPL-MCNC: 0.96 MG/DL (ref 0.57–1)
EGFRCR SERPLBLD CKD-EPI 2021: 80.8 ML/MIN/1.73
FLUBV RNA ISLT QL NAA+PROBE: NOT DETECTED
GLOBULIN UR ELPH-MCNC: 3.4 GM/DL
GLUCOSE SERPL-MCNC: 87 MG/DL (ref 65–99)
GLUCOSE UR STRIP-MCNC: NEGATIVE MG/DL
HADV DNA SPEC NAA+PROBE: NOT DETECTED
HCOV 229E RNA SPEC QL NAA+PROBE: NOT DETECTED
HCOV HKU1 RNA SPEC QL NAA+PROBE: NOT DETECTED
HCOV NL63 RNA SPEC QL NAA+PROBE: NOT DETECTED
HCOV OC43 RNA SPEC QL NAA+PROBE: NOT DETECTED
HGB UR QL STRIP.AUTO: ABNORMAL
HMPV RNA NPH QL NAA+NON-PROBE: NOT DETECTED
HPIV1 RNA ISLT QL NAA+PROBE: NOT DETECTED
HPIV2 RNA SPEC QL NAA+PROBE: NOT DETECTED
HPIV3 RNA NPH QL NAA+PROBE: NOT DETECTED
HPIV4 P GENE NPH QL NAA+PROBE: NOT DETECTED
HYALINE CASTS UR QL AUTO: ABNORMAL /LPF
KETONES UR QL STRIP: NEGATIVE
LEUKOCYTE ESTERASE UR QL STRIP.AUTO: ABNORMAL
M PNEUMO IGG SER IA-ACNC: NOT DETECTED
NITRITE UR QL STRIP: NEGATIVE
NT-PROBNP SERPL-MCNC: 178 PG/ML (ref 0–450)
PH UR STRIP.AUTO: 7.5 [PH] (ref 5–8)
POTASSIUM SERPL-SCNC: 3.9 MMOL/L (ref 3.5–5.2)
PROCALCITONIN SERPL-MCNC: <0.02 NG/ML (ref 0–0.25)
PROT SERPL-MCNC: 7.2 G/DL (ref 6–8.5)
PROT UR QL STRIP: ABNORMAL
QT INTERVAL: 435 MS
RBC # UR STRIP: ABNORMAL /HPF
REF LAB TEST METHOD: ABNORMAL
RHINOVIRUS RNA SPEC NAA+PROBE: NOT DETECTED
RSV RNA NPH QL NAA+NON-PROBE: NOT DETECTED
SARS-COV-2 RNA NPH QL NAA+NON-PROBE: NOT DETECTED
SODIUM SERPL-SCNC: 141 MMOL/L (ref 136–145)
SP GR UR STRIP: >=1.03 (ref 1–1.03)
SQUAMOUS #/AREA URNS HPF: ABNORMAL /HPF
TROPONIN T SERPL-MCNC: <0.01 NG/ML (ref 0–0.03)
UROBILINOGEN UR QL STRIP: ABNORMAL
WBC # UR STRIP: ABNORMAL /HPF

## 2022-03-05 PROCEDURE — 81001 URINALYSIS AUTO W/SCOPE: CPT | Performed by: PHYSICIAN ASSISTANT

## 2022-03-05 NOTE — ED PROVIDER NOTES
EMERGENCY DEPARTMENT ENCOUNTER    Room Number:  03/03  Date of encounter:  3/5/2022  PCP: Meeta Dickson APRN  Historian: Patient      HPI:  Chief Complaint: Headache       Context: Alejo Pascal is a 31 y.o. female with past medical history of anxiety and depression who presents to the ED c/o sick since yesterday.  Patient states that she started to develop headaches, body aches, cough, trouble breathing, sore throat, runny nose, sneezing, and nausea yesterday that has gotten progressively worse.  Denies any chest pain, fevers, vomiting, diarrhea, or UTI symptoms.  Denies any known sick contacts, but works at [employer] and is unsure if anybody there is sick.  Patient states that she had Covid in October 2021, and has not been vaccinated for flu or Covid.      PAST MEDICAL HISTORY  Active Ambulatory Problems     Diagnosis Date Noted   • No Active Ambulatory Problems     Resolved Ambulatory Problems     Diagnosis Date Noted   • No Resolved Ambulatory Problems     Past Medical History:   Diagnosis Date   • Anxiety    • Panic attack          PAST SURGICAL HISTORY  No past surgical history on file.      FAMILY HISTORY  No family history on file.      SOCIAL HISTORY  Social History     Socioeconomic History   • Marital status: Single   Tobacco Use   • Smoking status: Current Every Day Smoker     Packs/day: 1.50     Types: Cigarettes   • Smokeless tobacco: Never Used   Vaping Use   • Vaping Use: Never used   Substance and Sexual Activity   • Alcohol use: Yes     Comment: 4 mikes hard drinks on days not working, pt works 4 days a week   • Drug use: Yes     Frequency: 1.0 times per week     Comment: Ecstacy once a week   • Sexual activity: Defer         ALLERGIES  Patient has no known allergies.        REVIEW OF SYSTEMS  Review of Systems     All systems reviewed and negative except for those discussed in HPI.       PHYSICAL EXAM    I have reviewed the triage vital signs and nursing notes.    ED Triage Vitals   Temp Heart  Rate Resp BP SpO2   03/04/22 2145 03/04/22 2146 03/04/22 2145 -- 03/04/22 2146   97.7 °F (36.5 °C) 69 18  92 %      Temp src Heart Rate Source Patient Position BP Location FiO2 (%)   -- -- -- -- --              Physical Exam  GENERAL: Alert and orient x3, not distressed  HENT: TMs clear, no rhinorrhea, no pharyngeal erythema or tonsillar exudate, no meningeal signs  EYES: no scleral icterus, PERRL, EOMI  CV: regular rhythm, regular rate  RESPIRATORY: normal effort, clear to auscultate bilaterally without wheezes, rales, or rhonchi  ABDOMEN: soft/nontender  MUSCULOSKELETAL: no deformity  NEURO: alert, moves all extremities, follows commands  SKIN: warm, dry        LAB RESULTS  Recent Results (from the past 24 hour(s))   Comprehensive Metabolic Panel    Collection Time: 03/04/22 11:28 PM    Specimen: Blood   Result Value Ref Range    Glucose 87 65 - 99 mg/dL    BUN 20 6 - 20 mg/dL    Creatinine 0.96 0.57 - 1.00 mg/dL    Sodium 141 136 - 145 mmol/L    Potassium 3.9 3.5 - 5.2 mmol/L    Chloride 105 98 - 107 mmol/L    CO2 25.3 22.0 - 29.0 mmol/L    Calcium 9.1 8.6 - 10.5 mg/dL    Total Protein 7.2 6.0 - 8.5 g/dL    Albumin 3.80 3.50 - 5.20 g/dL    ALT (SGPT) 11 1 - 33 U/L    AST (SGOT) 14 1 - 32 U/L    Alkaline Phosphatase 86 39 - 117 U/L    Total Bilirubin <0.2 0.0 - 1.2 mg/dL    Globulin 3.4 gm/dL    A/G Ratio 1.1 g/dL    BUN/Creatinine Ratio 20.8 7.0 - 25.0    Anion Gap 10.7 5.0 - 15.0 mmol/L    eGFR 80.8 >60.0 mL/min/1.73   BNP    Collection Time: 03/04/22 11:28 PM    Specimen: Blood   Result Value Ref Range    proBNP 178.0 0.0 - 450.0 pg/mL   Troponin    Collection Time: 03/04/22 11:28 PM    Specimen: Blood   Result Value Ref Range    Troponin T <0.010 0.000 - 0.030 ng/mL   Procalcitonin    Collection Time: 03/04/22 11:28 PM    Specimen: Blood   Result Value Ref Range    Procalcitonin <0.02 0.00 - 0.25 ng/mL   CBC Auto Differential    Collection Time: 03/04/22 11:28 PM    Specimen: Blood   Result Value Ref Range     WBC 7.47 3.40 - 10.80 10*3/mm3    RBC 4.63 3.77 - 5.28 10*6/mm3    Hemoglobin 13.8 12.0 - 15.9 g/dL    Hematocrit 42.1 34.0 - 46.6 %    MCV 90.9 79.0 - 97.0 fL    MCH 29.8 26.6 - 33.0 pg    MCHC 32.8 31.5 - 35.7 g/dL    RDW 13.5 12.3 - 15.4 %    RDW-SD 44.8 37.0 - 54.0 fl    MPV 10.0 6.0 - 12.0 fL    Platelets 240 140 - 450 10*3/mm3    Neutrophil % 67.0 42.7 - 76.0 %    Lymphocyte % 15.4 (L) 19.6 - 45.3 %    Monocyte % 12.9 (H) 5.0 - 12.0 %    Eosinophil % 3.2 0.3 - 6.2 %    Basophil % 1.1 0.0 - 1.5 %    Immature Grans % 0.4 0.0 - 0.5 %    Neutrophils, Absolute 5.01 1.70 - 7.00 10*3/mm3    Lymphocytes, Absolute 1.15 0.70 - 3.10 10*3/mm3    Monocytes, Absolute 0.96 (H) 0.10 - 0.90 10*3/mm3    Eosinophils, Absolute 0.24 0.00 - 0.40 10*3/mm3    Basophils, Absolute 0.08 0.00 - 0.20 10*3/mm3    Immature Grans, Absolute 0.03 0.00 - 0.05 10*3/mm3    nRBC 0.0 0.0 - 0.2 /100 WBC   Respiratory Panel PCR w/COVID-19(SARS-CoV-2) JOVITA/HUONG/SINA/PAD/COR/MAD/HANNA In-House, NP Swab in [facility], 3-4 HR TAT - Swab, Nasopharynx    Collection Time: 03/04/22 11:29 PM    Specimen: Nasopharynx; Swab   Result Value Ref Range    ADENOVIRUS, PCR Not Detected Not Detected    Coronavirus 229E Not Detected Not Detected    Coronavirus HKU1 Not Detected Not Detected    Coronavirus NL63 Not Detected Not Detected    Coronavirus OC43 Not Detected Not Detected    COVID19 Not Detected Not Detected - Ref. Range    Human Metapneumovirus Not Detected Not Detected    Human Rhinovirus/Enterovirus Not Detected Not Detected    Influenza B PCR Not Detected Not Detected    Parainfluenza Virus 1 Not Detected Not Detected    Parainfluenza Virus 2 Not Detected Not Detected    Parainfluenza Virus 3 Not Detected Not Detected    Parainfluenza Virus 4 Not Detected Not Detected    RSV, PCR Not Detected Not Detected    Bordetella pertussis pcr Not Detected Not Detected    Bordetella parapertussis PCR Not Detected Not Detected    Chlamydophila pneumoniae PCR Not Detected Not  Detected    Mycoplasma pneumo by PCR Not Detected Not Detected   ECG 12 Lead    Collection Time: 03/04/22 11:54 PM   Result Value Ref Range    QT Interval 435 ms   Urinalysis With Microscopic If Indicated (No Culture) - Urine, Clean Catch    Collection Time: 03/05/22 12:02 AM    Specimen: Urine, Clean Catch   Result Value Ref Range    Color, UA [place] (A) Yellow, Straw    Appearance, UA Cloudy (A) Clear    pH, UA 7.5 5.0 - 8.0    Specific Gravity, UA >=1.030 1.005 - 1.030    Glucose, UA Negative Negative    Ketones, UA Negative Negative    Bilirubin, UA Negative Negative    Blood, UA Large (3+) (A) Negative    Protein, UA >=300 mg/dL (3+) (A) Negative    Leuk Esterase, UA Small (1+) (A) Negative    Nitrite, UA Negative Negative    Urobilinogen, UA 0.2 E.U./dL 0.2 - 1.0 E.U./dL   Urinalysis, Microscopic Only - Urine, Clean Catch    Collection Time: 03/05/22 12:02 AM    Specimen: Urine, Clean Catch   Result Value Ref Range    RBC, UA Too Numerous to Count (A) None Seen, 0-2 /HPF    WBC, UA 6-12 (A) None Seen, 0-2 /HPF    Bacteria, UA None Seen None Seen /HPF    Squamous Epithelial Cells, UA 7-12 (A) None Seen, 0-2 /HPF    Hyaline Casts, UA 3-6 None Seen /LPF    Methodology Manual Light Microscopy        Ordered the above labs and independently reviewed the results.        RADIOLOGY  XR Chest 1 View    Result Date: 3/4/2022  SINGLE VIEW OF THE CHEST  HISTORY: Shortness of air  COMPARISON: 10/04/2021  FINDINGS: Heart size is within normal limits. No pneumothorax, pleural effusion, or acute infiltrate is seen.      No acute findings.  This report was finalized on 3/4/2022 11:49 PM by Dr. Mirella Gtz M.D.        I ordered the above noted radiological studies. Reviewed by me and discussed with radiologist.  See dictation for official radiology interpretation.      PROCEDURES    Procedures      MEDICATIONS GIVEN IN ER    Medications   sodium chloride 0.9 % flush 10 mL (has no administration in time range)          PROGRESS, DATA ANALYSIS, CONSULTS, AND MEDICAL DECISION MAKING    All labs have been independently reviewed by me.  All radiology studies have been reviewed by me and discussed with radiologist dictating the report.   EKG's independently viewed and interpreted by me.  Discussion below represents my analysis of pertinent findings related to patient's condition, differential diagnosis, treatment plan and final disposition.    DDx: Includes but not limited to pneumonia, Covid, influenza, asthma, URI    ED Course as of 03/05/22 0336   Fri Mar 04, 2022   8509 EKG          EKG time: 23:54  Rhythm/Rate: Sinus rhythm at 87 bpm  P waves and SD: Normal  QRS, axis: Normal  ST and T waves: No acute ST/T wave changes    Interpreted Contemporaneously by me, independently viewed  no prior EKG for comparison.  [HECTOR]   Sat Mar 05, 2022   0014 WBC: 7.47 [HECTOR]   0014 Hemoglobin: 13.8 [HECTOR]   0014 Hematocrit: 42.1 [HECTOR]   0014 Platelets: 240 [HECTOR]   0014 Sodium: 141 [HECTOR]   0014 Potassium: 3.9 [HECTOR]   0014 Chloride: 105 [HECTOR]   0014 CO2: 25.3 [HECTOR]   0014 BUN: 20 [HECTOR]   0014 Creatinine: 0.96 [HECTOR]   0014 Glucose: 87 [HECTOR]   0014 Procalcitonin: <0.02 [HECTOR]   0014 proBNP: 178.0 [HECTOR]   0014 Troponin T: <0.010 [HECTOR]   0014 Patient's blood work, including CBC, CMP [HECTOR]   0015 , pro-Rene, BNP, and troponin are all normal.  Chest x-ray shows no active disease, EKG is nonischemic.  Waiting on respiratory viral panel and urinalysis. [HECTOR]   0320 Patient rechecked, resting comfortably bed, no acute distress.  Discussed results, diagnosis, and treatment plan.  Patient expressed understanding and agrees to plan. [HECTOR]      ED Course User Index  [HECTOR] Zay Coulter, PA       MDM: Patient's evaluation is unremarkable, negative for urinary tract infection, pneumonia, respiratory viral panel is normal, as well as the patient's cardiac work-up.  Symptoms are improved, vital signs are stable, patient is safe for discharge home.    PPE: The patient wore a  surgical mask throughout the entire patient encounter. I wore an N95.    AS OF 03:36 EST VITALS:    BP -    HR - 69  TEMP - 97.7 °F (36.5 °C)  O2 SATS - 92%        DIAGNOSIS  Final diagnoses:   Viral syndrome         DISPOSITION  DISCHARGE    Patient discharged in stable condition.    Reviewed implications of results, diagnosis, meds, responsibility to follow up, warning signs and symptoms of possible worsening, potential complications and reasons to return to ER.    Patient/Family voiced understanding of above instructions.    Discussed plan for discharge, as there is no emergent indication for admission. Patient referred to primary care provider for BP management due to today's BP. Pt/family is agreeable and understands need for follow up and repeat testing.  Pt is aware that discharge does not mean that nothing is wrong but it indicates no emergency is present that requires admission and they must continue care with follow-up as given below or physician of their choice.     FOLLOW-UP  Meeta Dickson, APRN  [address] [address]  [phone]    Schedule an appointment as soon as possible for a visit in 3 days           Medication List      No changes were made to your prescriptions during this visit.                    Zay Coulter PA  03/05/22 0337

## 2022-03-05 NOTE — ED NOTES
Pt in mask throughout encounter. This ERT was in appropriate ppe.       Doc Bahena, PCT  03/04/22 0548

## 2022-03-05 NOTE — DISCHARGE INSTRUCTIONS
Home, rest, Tylenol or ibuprofen as needed, keep well-hydrated.  Home medicine as prescribed, follow up with PCP for recheck. Return to care with further concerns.

## 2022-03-05 NOTE — ED TRIAGE NOTES
.Pt masked on arrival, staff masked    Pt reports deshpande, feeling bad started yesterday, body aches

## 2022-03-05 NOTE — ED PROVIDER NOTES
MD ATTESTATION NOTE    The MONO and I have discussed this patient's history, physical exam, and treatment plan.    I provided a substantive portion of the care of this patient. I personally performed the physical exam, in its entirety. The attached note describes my personal findings.      Alejo Pascal is a 31 y.o. female who presents to the ED c/o body aches cough headaches and chest pain since yesterday.  Denies any known sick contacts.  Denies fevers or chills.      On exam:  GENERAL: not distressed  HENT: nares patent, supple  EYES: no scleral icterus  CV: regular rhythm, regular rate  RESPIRATORY: normal effort, clear to supralaterally  ABDOMEN: soft nontender nondistended  MUSCULOSKELETAL: no deformity  NEURO: alert, moves all extremities, follows commands  SKIN: warm, dry    Labs  Recent Results (from the past 24 hour(s))   Comprehensive Metabolic Panel    Collection Time: 03/04/22 11:28 PM    Specimen: Blood   Result Value Ref Range    Glucose 87 65 - 99 mg/dL    BUN 20 6 - 20 mg/dL    Creatinine 0.96 0.57 - 1.00 mg/dL    Sodium 141 136 - 145 mmol/L    Potassium 3.9 3.5 - 5.2 mmol/L    Chloride 105 98 - 107 mmol/L    CO2 25.3 22.0 - 29.0 mmol/L    Calcium 9.1 8.6 - 10.5 mg/dL    Total Protein 7.2 6.0 - 8.5 g/dL    Albumin 3.80 3.50 - 5.20 g/dL    ALT (SGPT) 11 1 - 33 U/L    AST (SGOT) 14 1 - 32 U/L    Alkaline Phosphatase 86 39 - 117 U/L    Total Bilirubin <0.2 0.0 - 1.2 mg/dL    Globulin 3.4 gm/dL    A/G Ratio 1.1 g/dL    BUN/Creatinine Ratio 20.8 7.0 - 25.0    Anion Gap 10.7 5.0 - 15.0 mmol/L    eGFR 80.8 >60.0 mL/min/1.73   BNP    Collection Time: 03/04/22 11:28 PM    Specimen: Blood   Result Value Ref Range    proBNP 178.0 0.0 - 450.0 pg/mL   Troponin    Collection Time: 03/04/22 11:28 PM    Specimen: Blood   Result Value Ref Range    Troponin T <0.010 0.000 - 0.030 ng/mL   Procalcitonin    Collection Time: 03/04/22 11:28 PM    Specimen: Blood   Result Value Ref Range    Procalcitonin <0.02 0.00 - 0.25  ng/mL   CBC Auto Differential    Collection Time: 03/04/22 11:28 PM    Specimen: Blood   Result Value Ref Range    WBC 7.47 3.40 - 10.80 10*3/mm3    RBC 4.63 3.77 - 5.28 10*6/mm3    Hemoglobin 13.8 12.0 - 15.9 g/dL    Hematocrit 42.1 34.0 - 46.6 %    MCV 90.9 79.0 - 97.0 fL    MCH 29.8 26.6 - 33.0 pg    MCHC 32.8 31.5 - 35.7 g/dL    RDW 13.5 12.3 - 15.4 %    RDW-SD 44.8 37.0 - 54.0 fl    MPV 10.0 6.0 - 12.0 fL    Platelets 240 140 - 450 10*3/mm3    Neutrophil % 67.0 42.7 - 76.0 %    Lymphocyte % 15.4 (L) 19.6 - 45.3 %    Monocyte % 12.9 (H) 5.0 - 12.0 %    Eosinophil % 3.2 0.3 - 6.2 %    Basophil % 1.1 0.0 - 1.5 %    Immature Grans % 0.4 0.0 - 0.5 %    Neutrophils, Absolute 5.01 1.70 - 7.00 10*3/mm3    Lymphocytes, Absolute 1.15 0.70 - 3.10 10*3/mm3    Monocytes, Absolute 0.96 (H) 0.10 - 0.90 10*3/mm3    Eosinophils, Absolute 0.24 0.00 - 0.40 10*3/mm3    Basophils, Absolute 0.08 0.00 - 0.20 10*3/mm3    Immature Grans, Absolute 0.03 0.00 - 0.05 10*3/mm3    nRBC 0.0 0.0 - 0.2 /100 WBC   Respiratory Panel PCR w/COVID-19(SARS-CoV-2) JOVITA/HUONG/SINA/PAD/COR/MAD/HANNA In-House, NP Swab in [facility] 3-4 HR TAT - Swab, Nasopharynx    Collection Time: 03/04/22 11:29 PM    Specimen: Nasopharynx; Swab   Result Value Ref Range    ADENOVIRUS, PCR Not Detected Not Detected    Coronavirus 229E Not Detected Not Detected    Coronavirus HKU1 Not Detected Not Detected    Coronavirus NL63 Not Detected Not Detected    Coronavirus OC43 Not Detected Not Detected    COVID19 Not Detected Not Detected - Ref. Range    Human Metapneumovirus Not Detected Not Detected    Human Rhinovirus/Enterovirus Not Detected Not Detected    Influenza B PCR Not Detected Not Detected    Parainfluenza Virus 1 Not Detected Not Detected    Parainfluenza Virus 2 Not Detected Not Detected    Parainfluenza Virus 3 Not Detected Not Detected    Parainfluenza Virus 4 Not Detected Not Detected    RSV, PCR Not Detected Not Detected    Bordetella pertussis pcr Not Detected Not  Detected    Bordetella parapertussis PCR Not Detected Not Detected    Chlamydophila pneumoniae PCR Not Detected Not Detected    Mycoplasma pneumo by PCR Not Detected Not Detected   ECG 12 Lead    Collection Time: 03/04/22 11:54 PM   Result Value Ref Range    QT Interval 435 ms   Urinalysis With Microscopic If Indicated (No Culture) - Urine, Clean Catch    Collection Time: 03/05/22 12:02 AM    Specimen: Urine, Clean Catch   Result Value Ref Range    Color, UA [place] (A) Yellow, Straw    Appearance, UA Cloudy (A) Clear    pH, UA 7.5 5.0 - 8.0    Specific Gravity, UA >=1.030 1.005 - 1.030    Glucose, UA Negative Negative    Ketones, UA Negative Negative    Bilirubin, UA Negative Negative    Blood, UA Large (3+) (A) Negative    Protein, UA >=300 mg/dL (3+) (A) Negative    Leuk Esterase, UA Small (1+) (A) Negative    Nitrite, UA Negative Negative    Urobilinogen, UA 0.2 E.U./dL 0.2 - 1.0 E.U./dL   Urinalysis, Microscopic Only - Urine, Clean Catch    Collection Time: 03/05/22 12:02 AM    Specimen: Urine, Clean Catch   Result Value Ref Range    RBC, UA Too Numerous to Count (A) None Seen, 0-2 /HPF    WBC, UA 6-12 (A) None Seen, 0-2 /HPF    Bacteria, UA None Seen None Seen /HPF    Squamous Epithelial Cells, UA 7-12 (A) None Seen, 0-2 /HPF    Hyaline Casts, UA 3-6 None Seen /LPF    Methodology Manual Light Microscopy        Radiology  XR Chest 1 View    Result Date: 3/4/2022  SINGLE VIEW OF THE CHEST  HISTORY: Shortness of air  COMPARISON: 10/04/2021  FINDINGS: Heart size is within normal limits. No pneumothorax, pleural effusion, or acute infiltrate is seen.      No acute findings.  This report was finalized on 3/4/2022 11:49 PM by Dr. Mirella Gtz M.D.        Medical Decision Making:  ED Course as of 03/05/22 0714   Fri Mar 04, 2022   4336 EKG          EKG time: 23:54  Rhythm/Rate: Sinus rhythm at 87 bpm  P waves and TX: Normal  QRS, axis: Normal  ST and T waves: No acute ST/T wave changes    Interpreted  Contemporaneously by me, independently viewed  no prior EKG for comparison.  [HECTOR]   Sat Mar 05, 2022   0014 WBC: 7.47 [HECTOR]   0014 Hemoglobin: 13.8 [HECTOR]   0014 Hematocrit: 42.1 [HECTOR]   0014 Platelets: 240 [HECTOR]   0014 Sodium: 141 [HECTOR]   0014 Potassium: 3.9 [HECTOR]   0014 Chloride: 105 [HECTOR]   0014 CO2: 25.3 [HECTOR]   0014 BUN: 20 [HECTOR]   0014 Creatinine: 0.96 [HECTOR]   0014 Glucose: 87 [HECTOR]   0014 Procalcitonin: <0.02 [HECTOR]   0014 proBNP: 178.0 [HECTOR]   0014 Troponin T: <0.010 [HECTOR]   0014 Patient's blood work, including CBC, CMP [HECTOR]   0015 , pro-Rene, BNP, and troponin are all normal.  Chest x-ray shows no active disease, EKG is nonischemic.  Waiting on respiratory viral panel and urinalysis. [HECTOR]   0320 Patient rechecked, resting comfortably bed, no acute distress.  Discussed results, diagnosis, and treatment plan.  Patient expressed understanding and agrees to plan. [HECTOR]      ED Course User Index  [HECTOR] Zay Coulter, PA           PPE: Both the patient and I wore a surgical mask throughout the entire patient encounter. I wore protective goggles.     Diagnosis  Final diagnoses:   Viral syndrome        Dawson Ballesteros MD  03/05/22 0714

## 2022-06-02 ENCOUNTER — APPOINTMENT (OUTPATIENT)
Dept: GENERAL RADIOLOGY | Facility: HOSPITAL | Age: 32
End: 2022-06-02

## 2022-06-02 ENCOUNTER — HOSPITAL ENCOUNTER (EMERGENCY)
Facility: HOSPITAL | Age: 32
Discharge: HOME | End: 2022-06-02
Attending: EMERGENCY MEDICINE | Admitting: EMERGENCY MEDICINE

## 2022-06-02 ENCOUNTER — APPOINTMENT (OUTPATIENT)
Dept: CT IMAGING | Facility: HOSPITAL | Age: 32
End: 2022-06-02

## 2022-06-02 VITALS
RESPIRATION RATE: 16 BRPM | SYSTOLIC BLOOD PRESSURE: 150 MMHG | OXYGEN SATURATION: 97 % | DIASTOLIC BLOOD PRESSURE: 93 MMHG | TEMPERATURE: 98.1 F | HEART RATE: 76 BPM

## 2022-06-02 DIAGNOSIS — B34.2 CORONAVIRUS INFECTION: Primary | ICD-10-CM

## 2022-06-02 DIAGNOSIS — R51.9 ACUTE NONINTRACTABLE HEADACHE, UNSPECIFIED HEADACHE TYPE: ICD-10-CM

## 2022-06-02 LAB
ALBUMIN SERPL-MCNC: 4.1 G/DL (ref 3.5–5.2)
ALBUMIN/GLOB SERPL: 1.5 G/DL
ALP SERPL-CCNC: 74 U/L (ref 39–117)
ALT SERPL W P-5'-P-CCNC: 14 U/L (ref 1–33)
ANION GAP SERPL CALCULATED.3IONS-SCNC: 10 MMOL/L (ref 5–15)
AST SERPL-CCNC: 11 U/L (ref 1–32)
B PARAPERT DNA SPEC QL NAA+PROBE: NOT DETECTED
B PERT DNA SPEC QL NAA+PROBE: NOT DETECTED
BASOPHILS # BLD AUTO: 0.07 10*3/MM3 (ref 0–0.2)
BASOPHILS NFR BLD AUTO: 0.9 % (ref 0–1.5)
BILIRUB SERPL-MCNC: <0.2 MG/DL (ref 0–1.2)
BUN SERPL-MCNC: 16 MG/DL (ref 6–20)
BUN/CREAT SERPL: 26.7 (ref 7–25)
C PNEUM DNA NPH QL NAA+NON-PROBE: NOT DETECTED
CALCIUM SPEC-SCNC: 9 MG/DL (ref 8.6–10.5)
CHLORIDE SERPL-SCNC: 103 MMOL/L (ref 98–107)
CK SERPL-CCNC: 54 U/L (ref 20–180)
CO2 SERPL-SCNC: 25 MMOL/L (ref 22–29)
CREAT SERPL-MCNC: 0.6 MG/DL (ref 0.57–1)
DEPRECATED RDW RBC AUTO: 42.1 FL (ref 37–54)
EGFRCR SERPLBLD CKD-EPI 2021: 122.5 ML/MIN/1.73
EOSINOPHIL # BLD AUTO: 0.18 10*3/MM3 (ref 0–0.4)
EOSINOPHIL NFR BLD AUTO: 2.4 % (ref 0.3–6.2)
ERYTHROCYTE [DISTWIDTH] IN BLOOD BY AUTOMATED COUNT: 13 % (ref 12.3–15.4)
FLUAV SUBTYP SPEC NAA+PROBE: NOT DETECTED
FLUBV RNA ISLT QL NAA+PROBE: NOT DETECTED
GLOBULIN UR ELPH-MCNC: 2.8 GM/DL
GLUCOSE SERPL-MCNC: 95 MG/DL (ref 65–99)
HADV DNA SPEC NAA+PROBE: NOT DETECTED
HCOV 229E RNA SPEC QL NAA+PROBE: NOT DETECTED
HCOV HKU1 RNA SPEC QL NAA+PROBE: NOT DETECTED
HCOV NL63 RNA SPEC QL NAA+PROBE: NOT DETECTED
HCOV OC43 RNA SPEC QL NAA+PROBE: DETECTED
HCT VFR BLD AUTO: 40.1 % (ref 34–46.6)
HGB BLD-MCNC: 13.8 G/DL (ref 12–15.9)
HMPV RNA NPH QL NAA+NON-PROBE: NOT DETECTED
HPIV1 RNA ISLT QL NAA+PROBE: NOT DETECTED
HPIV2 RNA SPEC QL NAA+PROBE: NOT DETECTED
HPIV3 RNA NPH QL NAA+PROBE: NOT DETECTED
HPIV4 P GENE NPH QL NAA+PROBE: NOT DETECTED
IMM GRANULOCYTES # BLD AUTO: 0.04 10*3/MM3 (ref 0–0.05)
IMM GRANULOCYTES NFR BLD AUTO: 0.5 % (ref 0–0.5)
LYMPHOCYTES # BLD AUTO: 1.19 10*3/MM3 (ref 0.7–3.1)
LYMPHOCYTES NFR BLD AUTO: 16 % (ref 19.6–45.3)
M PNEUMO IGG SER IA-ACNC: NOT DETECTED
MCH RBC QN AUTO: 30.6 PG (ref 26.6–33)
MCHC RBC AUTO-ENTMCNC: 34.4 G/DL (ref 31.5–35.7)
MCV RBC AUTO: 88.9 FL (ref 79–97)
MONOCYTES # BLD AUTO: 1.05 10*3/MM3 (ref 0.1–0.9)
MONOCYTES NFR BLD AUTO: 14.1 % (ref 5–12)
NEUTROPHILS NFR BLD AUTO: 4.93 10*3/MM3 (ref 1.7–7)
NEUTROPHILS NFR BLD AUTO: 66.1 % (ref 42.7–76)
NRBC BLD AUTO-RTO: 0 /100 WBC (ref 0–0.2)
PLATELET # BLD AUTO: 229 10*3/MM3 (ref 140–450)
PMV BLD AUTO: 9.4 FL (ref 6–12)
POTASSIUM SERPL-SCNC: 4 MMOL/L (ref 3.5–5.2)
PROT SERPL-MCNC: 6.9 G/DL (ref 6–8.5)
RBC # BLD AUTO: 4.51 10*6/MM3 (ref 3.77–5.28)
RHINOVIRUS RNA SPEC NAA+PROBE: NOT DETECTED
RSV RNA NPH QL NAA+NON-PROBE: NOT DETECTED
SARS-COV-2 RNA NPH QL NAA+NON-PROBE: NOT DETECTED
SODIUM SERPL-SCNC: 138 MMOL/L (ref 136–145)
WBC NRBC COR # BLD: 7.46 10*3/MM3 (ref 3.4–10.8)

## 2022-06-02 PROCEDURE — 85025 COMPLETE CBC W/AUTO DIFF WBC: CPT | Performed by: PHYSICIAN ASSISTANT

## 2022-06-02 PROCEDURE — 99283 EMERGENCY DEPT VISIT LOW MDM: CPT

## 2022-06-02 PROCEDURE — 82550 ASSAY OF CK (CPK): CPT | Performed by: PHYSICIAN ASSISTANT

## 2022-06-02 PROCEDURE — 71045 X-RAY EXAM CHEST 1 VIEW: CPT

## 2022-06-02 PROCEDURE — 70450 CT HEAD/BRAIN W/O DYE: CPT

## 2022-06-02 PROCEDURE — 0202U NFCT DS 22 TRGT SARS-COV-2: CPT | Performed by: PHYSICIAN ASSISTANT

## 2022-06-02 PROCEDURE — 80053 COMPREHEN METABOLIC PANEL: CPT | Performed by: PHYSICIAN ASSISTANT

## 2022-06-02 RX ORDER — ALBUTEROL SULFATE 90 UG/1
2 AEROSOL, METERED RESPIRATORY (INHALATION) EVERY 6 HOURS PRN
Qty: 6.7 G | Refills: 0 | OUTPATIENT
Start: 2022-06-02 | End: 2023-04-05

## 2022-06-02 RX ORDER — BUTALBITAL, ACETAMINOPHEN AND CAFFEINE 50; 325; 40 MG/1; MG/1; MG/1
2 TABLET ORAL ONCE
Status: COMPLETED | OUTPATIENT
Start: 2022-06-02 | End: 2022-06-02

## 2022-06-02 RX ORDER — BENZONATATE 200 MG/1
200 CAPSULE ORAL 3 TIMES DAILY PRN
Qty: 20 CAPSULE | Refills: 0 | Status: SHIPPED | OUTPATIENT
Start: 2022-06-02

## 2022-06-02 RX ORDER — TRAZODONE HYDROCHLORIDE 50 MG/1
100 TABLET ORAL NIGHTLY
COMMUNITY

## 2022-06-02 RX ORDER — PAROXETINE 30 MG/1
60 TABLET, FILM COATED ORAL EVERY MORNING
COMMUNITY

## 2022-06-02 RX ADMIN — BUTALBITAL, ACETAMINOPHEN, AND CAFFEINE 2 TABLET: 50; 325; 40 TABLET ORAL at 23:05

## 2022-06-03 NOTE — ED TRIAGE NOTES
"Patient to ED by self from home with complaint of numbness and tingling around mouth, feeling clammy, and dizzy for the past 5 days. Patient went for  COVID and influenza test yesterday and tested negative for both. Denies history of hypertension or stroke. Patient alert and oriented x 4. Patient adds that she has lower abdominal pain as well.    Patient adds, \"My whole house is sick, but they haven't been tested. I think I gave it to them.\"  "

## 2022-06-03 NOTE — ED PROVIDER NOTES
MD ATTESTATION NOTE    The MONO and I have discussed this patient's history, physical exam, and treatment plan.  I have reviewed the documentation and personally had a face to face interaction with the patient. I affirm the documentation and agree with the treatment and plan.  The attached note describes my personal findings.    I provided a substantive portion of the care of this patient. I personally performed the physical exam, in its entirety.    History  31-year-old female presents with tingling and numbness around her mouth bilaterally.  She has had recent URI symptoms has had other family members.    Physical Exam  Vital Signs reviewed  GENERAL: Alert female no obvious distress  HENT: nares patent  EYES: no scleral icterus  CV: regular rhythm, regular rate-no murmur  RESPIRATORY: normal effort, clear to auscultation bilaterally  ABDOMEN: soft  MUSCULOSKELETAL: no deformity  NEURO: Strength sensation and coordination are grossly intact.  Speech and mentation are unremarkable  SKIN: warm, dry      Disposition  I discussed treatment and evaluation this patient with FRIDA Hayden.  Work-up here in the ED has been fairly unremarkable with exception of noted coronavirus infection.  CT scan of the brain shows no unusual abnormalities.  Exam is not consistent with stroke or other serious intracranial problem.  Patient does not appear to have clinical meningitis.  I told her that I believe her symptoms may be related to coronavirus infection and should resolve spontaneously.       Luciano Mitchell MD  06/03/22 020

## 2022-08-14 PROCEDURE — 99283 EMERGENCY DEPT VISIT LOW MDM: CPT

## 2022-08-15 ENCOUNTER — HOSPITAL ENCOUNTER (EMERGENCY)
Facility: HOSPITAL | Age: 32
Discharge: HOME | End: 2022-08-15
Attending: EMERGENCY MEDICINE | Admitting: EMERGENCY MEDICINE

## 2022-08-15 ENCOUNTER — APPOINTMENT (OUTPATIENT)
Dept: GENERAL RADIOLOGY | Facility: HOSPITAL | Age: 32
End: 2022-08-15

## 2022-08-15 VITALS
WEIGHT: 220 LBS | SYSTOLIC BLOOD PRESSURE: 130 MMHG | RESPIRATION RATE: 16 BRPM | HEART RATE: 65 BPM | BODY MASS INDEX: 35.36 KG/M2 | TEMPERATURE: 97.3 F | OXYGEN SATURATION: 100 % | HEIGHT: 66 IN | DIASTOLIC BLOOD PRESSURE: 91 MMHG

## 2022-08-15 DIAGNOSIS — M54.12 CERVICAL RADICULOPATHY: ICD-10-CM

## 2022-08-15 DIAGNOSIS — L25.9 CONTACT DERMATITIS, UNSPECIFIED CONTACT DERMATITIS TYPE, UNSPECIFIED TRIGGER: ICD-10-CM

## 2022-08-15 DIAGNOSIS — J20.9 ACUTE BRONCHITIS, UNSPECIFIED ORGANISM: Primary | ICD-10-CM

## 2022-08-15 LAB
QT INTERVAL: 454 MS
SARS-COV-2 ORF1AB RESP QL NAA+PROBE: NOT DETECTED

## 2022-08-15 PROCEDURE — U0004 COV-19 TEST NON-CDC HGH THRU: HCPCS | Performed by: PHYSICIAN ASSISTANT

## 2022-08-15 PROCEDURE — 93010 ELECTROCARDIOGRAM REPORT: CPT | Performed by: INTERNAL MEDICINE

## 2022-08-15 PROCEDURE — C9803 HOPD COVID-19 SPEC COLLECT: HCPCS | Performed by: PHYSICIAN ASSISTANT

## 2022-08-15 PROCEDURE — 71045 X-RAY EXAM CHEST 1 VIEW: CPT

## 2022-08-15 PROCEDURE — 93005 ELECTROCARDIOGRAM TRACING: CPT | Performed by: PHYSICIAN ASSISTANT

## 2022-08-15 RX ORDER — METHYLPREDNISOLONE 4 MG/1
TABLET ORAL
Qty: 1 EACH | Refills: 0 | Status: SHIPPED | OUTPATIENT
Start: 2022-08-15 | End: 2022-08-15 | Stop reason: SDUPTHER

## 2022-08-15 RX ORDER — CLOBETASOL PROPIONATE 0.5 MG/G
1 CREAM TOPICAL 2 TIMES DAILY
Qty: 15 G | Refills: 0 | Status: SHIPPED | OUTPATIENT
Start: 2022-08-15 | End: 2022-08-15 | Stop reason: SDUPTHER

## 2022-08-15 RX ORDER — METHOCARBAMOL 750 MG/1
750 TABLET, FILM COATED ORAL 3 TIMES DAILY PRN
Qty: 21 TABLET | Refills: 0 | Status: SHIPPED | OUTPATIENT
Start: 2022-08-15 | End: 2022-08-15 | Stop reason: SDUPTHER

## 2022-08-15 RX ORDER — METHYLPREDNISOLONE 4 MG/1
TABLET ORAL
Qty: 1 EACH | Refills: 0 | Status: SHIPPED | OUTPATIENT
Start: 2022-08-15

## 2022-08-15 RX ORDER — CLOBETASOL PROPIONATE 0.5 MG/G
1 CREAM TOPICAL 2 TIMES DAILY
Qty: 15 G | Refills: 0 | Status: SHIPPED | OUTPATIENT
Start: 2022-08-15

## 2022-08-15 RX ORDER — METHOCARBAMOL 750 MG/1
750 TABLET, FILM COATED ORAL 3 TIMES DAILY PRN
Qty: 21 TABLET | Refills: 0 | Status: SHIPPED | OUTPATIENT
Start: 2022-08-15

## 2022-08-15 NOTE — ED TRIAGE NOTES
To ER via PV.  C/o SOA, feeling shaky, pain shooting from neck into left arm, feels like legs are weak.  States started approx 2 hours ago.      Rash to chest x 1 week.     Pt in mask at time of triage.  Triage staff in appropriate PPE.

## 2022-08-15 NOTE — ED PROVIDER NOTES
MD ATTESTATION NOTE    The MONO and I have discussed this patient's history, physical exam, and treatment plan.    I provided a substantive portion of the care of this patient. I personally performed the physical exam, in its entirety. The attached note describes my personal findings.      Alejo Campa is a 32 y.o. female who presents to the ED c/o chronic cough that has worsened recently as well as shortness of breath.  She also complains of having neck pain that began today in radiates down her left arm.      On exam:  GENERAL: not distressed  HENT: nares patent  EYES: no scleral icterus  CV: regular rhythm, regular rate  RESPIRATORY: normal effort  MUSCULOSKELETAL: no deformity  NEURO: alert, moves all extremities, follows commands  5/5 strength to biceps, triceps, interosseus muscles, and FCR.  2+ reflexes to biceps, triceps, brachioradialis.   Sensation intact to light touch.  SKIN: warm, dry    Labs  Recent Results (from the past 24 hour(s))   ECG 12 Lead    Collection Time: 08/15/22  1:13 AM   Result Value Ref Range    QT Interval 454 ms       Radiology  XR Chest 1 View    Result Date: 8/15/2022  Patient: ALEJO CAMPA  Time Out: 01:44 Exam(s): FILM CXR 1 VIEW EXAM:   XR Chest, 1 View CLINICAL HISTORY:    Reason for exam: Cough, shortness of breath. TECHNIQUE:   Frontal view of the chest. COMPARISON:   No relevant prior studies available. FINDINGS:   Lungs:  No infiltrate.  No atelectasis.  No CHF.   Pleural space:  No pleural effusion.  No pneumothorax.   Heart:  Unremarkable.  No cardiomegaly.   Mediastinum:  Unremarkable.   Bones joints:  Unremarkable. IMPRESSION:       No acute abnormality.     Electronically signed by Polo Lopez M.D. on 08-15-22 at 0144      Medical Decision Making:  ED Course as of 08/15/22 0211   Mon Aug 15, 2022   0030 BP: 150/95 [RC]   0030 Temp: 97.3 °F (36.3 °C) [RC]   0030 Heart Rate: 98 [RC]   0030 Resp: 15 [RC]   0030 SpO2: 100 %  Room air [RC]   0118 EKG          EKG time:  0113  Rhythm/Rate: 58/isnus  P waves and VA: nl ingerval and p wave morphology  QRS, axis: Nl axis and morphology   ST and T waves: No acute st or t wave cgs     Interpreted Contemporaneously by me, independently viewed  -Unchanged from March 2022     [RC]   0119 BP: 131/85 [RC]   0120 Viewed the patient's chest x-ray there are no acute infiltrates and is unchanged from 3/4/2022.  We will confirm this with the radiologist official read [RC]   0207 Patient's work-up is unremarkable.  Clinical picture is most consistent with cervical radiculopathy, acute bronchitis, and contact dermatitis.  Plan to treat with Medrol, clobetasol cream, Robaxin.  We will have her to follow-up closely with her PCP if her symptoms do not subside in a week's time.  We will have her return to the emergency department with any further concerns or worsening symptoms. [RC]      ED Course User Index  [RC] Poli Hayden III, PA           PPE: Both the patient and I wore a surgical mask throughout the entire patient encounter. I wore protective goggles.     Diagnosis  Final diagnoses:   Acute bronchitis, unspecified organism   Radiculopathy, unspecified spinal region   Contact dermatitis, unspecified contact dermatitis type, unspecified trigger        Dawson Holguin II, MD  08/18/22 5875

## 2022-08-15 NOTE — ED PROVIDER NOTES
EMERGENCY DEPARTMENT ENCOUNTER    Room Number:  08/08  Date of encounter:  8/15/2022  PCP: Meeta Dickson APRN  Historian: Patient      HPI:  Chief Complaint: Cough, shortness of breath, neck pain radiating into the left arm, skin rash on chest wall    A complete HPI/ROS/PMH/PSH/SH/FH are unobtainable due to: Nothing    Context: Alejo Pascal is a 32 y.o. female who presents to the ED c/o cough, shortness of breath, neck pain, left arm pain.  Patient states cough began 3 to 4 days ago and is gradually progressed.  She attributes this shortness of breath she was feeling earlier to have a panic attack.  She states that seems to have subsided.  She also complains of a rash on her chest wall that she has had for the past 5 days.  The rash is not itchy.  It does not seem to be spreading.  Patient also complains of neck pain that is radiating into the left arm.  This has been ongoing for the past week as well.  Patient states the symptoms are worse when she is at work.  It may be exacerbated by movement of the neck.  She is here for further evaluation.    Patient denies fever, chills, photophobia, headache, abdominal pain, nausea, vomiting, dysuria, hematuria, pregnancy.        PAST MEDICAL HISTORY  Active Ambulatory Problems     Diagnosis Date Noted   • No Active Ambulatory Problems     Resolved Ambulatory Problems     Diagnosis Date Noted   • No Resolved Ambulatory Problems     Past Medical History:   Diagnosis Date   • Anxiety    • Panic attack          PAST SURGICAL HISTORY  Past Surgical History:   Procedure Laterality Date   • CHOLECYSTECTOMY     • TUBAL ABDOMINAL LIGATION           FAMILY HISTORY  No family history on file.      SOCIAL HISTORY  Social History     Socioeconomic History   • Marital status: Single   Tobacco Use   • Smoking status: Current Every Day Smoker     Packs/day: 1.50     Years: 15.00     Pack years: 22.50     Types: Cigarettes   • Smokeless tobacco: Never Used   Vaping Use   • Vaping Use:  Never used   Substance and Sexual Activity   • Alcohol use: Yes     Comment: 4 mikes hard drinks on days not working, pt works 4 days a week   • Drug use: Not Currently     Frequency: 1.0 times per week     Comment: previously used Ecstacy once a week, until 1 year ago   • Sexual activity: Defer         ALLERGIES  Patient has no known allergies.        REVIEW OF SYSTEMS  Review of Systems   HENT: Negative.    Respiratory: Positive for cough and shortness of breath.    Cardiovascular: Positive for chest pain (Worse with coughing).   Gastrointestinal: Negative for abdominal pain, nausea and vomiting.   Genitourinary: Negative.    Musculoskeletal: Positive for neck pain.        Left arm pain   Skin: Positive for rash.   Neurological: Negative.    Psychiatric/Behavioral: Negative.         All systems reviewed and negative except for those discussed in HPI.       PHYSICAL EXAM    I have reviewed the triage vital signs and nursing notes.    ED Triage Vitals [08/14/22 2121]   Temp Heart Rate Resp BP SpO2   97.3 °F (36.3 °C) 98 15 150/95 100 %      Temp src Heart Rate Source Patient Position BP Location FiO2 (%)   Tympanic Monitor -- -- --       Physical Exam  GENERAL: Pleasant, well-nourished, nontoxic  HENT: nares patent, oropharynx, TMs normal, mucous membranes moist  Neck: No nuchal rigidity.  I question a positive Spurling's test.  EYES: no scleral icterus  CV: regular rhythm, regular rate, normal S1-S2  RESPIRATORY: normal effort, lungs CTA B  ABDOMEN: soft, nontender   MUSCULOSKELETAL: no deformity  NEURO: alert and orient x4, moves all extremities, follows commands  SKIN: Erithmatic localized papular rash along that sternal extends to the breast line.        LAB RESULTS  Recent Results (from the past 24 hour(s))   ECG 12 Lead    Collection Time: 08/15/22  1:13 AM   Result Value Ref Range    QT Interval 454 ms       Ordered the above labs and independently reviewed the results.        RADIOLOGY  XR Chest 1  View    Result Date: 8/15/2022  Patient: ARNAUD CAMPA  Time Out: 01:44 Exam(s): FILM CXR 1 VIEW EXAM:   XR Chest, 1 View CLINICAL HISTORY:    Reason for exam: Cough, shortness of breath. TECHNIQUE:   Frontal view of the chest. COMPARISON:   No relevant prior studies available. FINDINGS:   Lungs:  No infiltrate.  No atelectasis.  No CHF.   Pleural space:  No pleural effusion.  No pneumothorax.   Heart:  Unremarkable.  No cardiomegaly.   Mediastinum:  Unremarkable.   Bones joints:  Unremarkable. IMPRESSION:       No acute abnormality.     Electronically signed by Polo Lopez M.D. on 08-15-22 at 0144      I ordered the above noted radiological studies. Reviewed by me and discussed with radiologist.  See dictation for official radiology interpretation.      PROCEDURES    Procedures      MEDICATIONS GIVEN IN ER    Medications - No data to display      PROGRESS, DATA ANALYSIS, CONSULTS, AND MEDICAL DECISION MAKING    All labs have been independently reviewed by me.  All radiology studies have been reviewed by me and discussed with radiologist dictating the report.   EKG's independently viewed and interpreted by me.  Discussion below represents my analysis of pertinent findings related to patient's condition, differential diagnosis, treatment plan and final disposition.    DDx for the shortness of breath includes but is not limited to: Panic attack, pneumonia, viral bronchitis, reactive airway disease, PE.  Doubt PE as the patient has a negative PERC criteria.    DDx for the left arm pain and shortness of breath include but are not limited to: Cervical radiculopathy, thoracic outlet syndrome, other nerve entrapment.  Less likely ACS.    DDx with the skin rash include but are not limited to: Scabies, heat rash, contact dermatitis.    ED Course as of 08/15/22 0208   Mon Aug 15, 2022   0030 BP: 150/95 [RC]   0030 Temp: 97.3 °F (36.3 °C) [RC]   0030 Heart Rate: 98 [RC]   0030 Resp: 15 [RC]   0030 SpO2: 100 %  Room air  [RC]   0118 EKG          EKG time: 0113  Rhythm/Rate: 58/isnus  P waves and TN: nl ingerval and p wave morphology  QRS, axis: Nl axis and morphology   ST and T waves: No acute st or t wave cgs     Interpreted Contemporaneously by me, independently viewed  -Unchanged from March 2022     [RC]   0119 BP: 131/85 [RC]   0120 Viewed the patient's chest x-ray there are no acute infiltrates and is unchanged from 3/4/2022.  We will confirm this with the radiologist official read [RC]   0207 Patient's work-up is unremarkable.  Clinical picture is most consistent with cervical radiculopathy, acute bronchitis, and contact dermatitis.  Plan to treat with Medrol, clobetasol cream, Robaxin.  We will have her to follow-up closely with her PCP if her symptoms do not subside in a week's time.  We will have her return to the emergency department with any further concerns or worsening symptoms. [RC]      ED Course User Index  [RC] Poli Hayden III, PA           PPE: The patient wore a surgical mask throughout the entire patient encounter. I wore an N95.    AS OF 02:08 EDT VITALS:    BP - 131/85  HR - 71  TEMP - 97.3 °F (36.3 °C) (Tympanic)  O2 SATS - 98%        DIAGNOSIS  Final diagnoses:   Acute bronchitis, unspecified organism   Contact dermatitis, unspecified contact dermatitis type, unspecified trigger   Cervical radiculopathy         DISPOSITION  DISCHARGE    Patient discharged in stable condition.    Reviewed implications of results, diagnosis, meds, responsibility to follow up, warning signs and symptoms of possible worsening, potential complications and reasons to return to ER.    Patient/Family voiced understanding of above instructions.    Discussed plan for discharge, as there is no emergent indication for admission. Patient referred to primary care provider for BP management due to today's BP. Pt/family is agreeable and understands need for follow up and repeat testing.  Pt is aware that discharge does not mean  that nothing is wrong but it indicates no emergency is present that requires admission and they must continue care with follow-up as given below or physician of their choice.     FOLLOW-UP  Meeta Dickson, APRN  [address]  [place] [address]  [phone]    Schedule an appointment as soon as possible for a visit in 1 week  For further evaluation and treatment, As needed         Medication List      New Prescriptions    clobetasol 0.05 % cream  Commonly known as: TEMOVATE  Apply 1 application topically to the appropriate area as directed 2 (Two) Times a Day.     methocarbamol 750 MG tablet  Commonly known as: ROBAXIN  Take 1 tablet by mouth 3 (Three) Times a Day As Needed for Muscle Spasms.        Changed    * methylPREDNISolone 4 MG dose pack  Commonly known as: MEDROL  Take as directed on package instructions.  What changed: Another medication with the same name was added. Make sure you understand how and when to take each.     * methylPREDNISolone 4 MG dose pack  Commonly known as: MEDROL  Take as directed on package instructions.  What changed: You were already taking a medication with the same name, and this prescription was added. Make sure you understand how and when to take each.         * This list has 2 medication(s) that are the same as other medications prescribed for you. Read the directions carefully, and ask your doctor or other care provider to review them with you.               Where to Get Your Medications      These medications were sent to [facility]/pharmacy #[id number] - [place], [place] - [address] - [phone]  - [phone]   [address], [facility] [address]    Hours: 24-hours Phone: [phone]   · clobetasol 0.05 % cream  · methocarbamol 750 MG tablet  · methylPREDNISolone 4 MG dose pack                Poli Hayden III, PA  08/15/22 6753

## 2022-09-03 ENCOUNTER — APPOINTMENT (OUTPATIENT)
Dept: GENERAL RADIOLOGY | Facility: HOSPITAL | Age: 32
End: 2022-09-03

## 2022-09-03 ENCOUNTER — HOSPITAL ENCOUNTER (EMERGENCY)
Facility: HOSPITAL | Age: 32
Discharge: HOME | End: 2022-09-04
Attending: EMERGENCY MEDICINE | Admitting: EMERGENCY MEDICINE

## 2022-09-03 DIAGNOSIS — J06.9 VIRAL URI: Primary | ICD-10-CM

## 2022-09-03 PROCEDURE — U0003 INFECTIOUS AGENT DETECTION BY NUCLEIC ACID (DNA OR RNA); SEVERE ACUTE RESPIRATORY SYNDROME CORONAVIRUS 2 (SARS-COV-2) (CORONAVIRUS DISEASE [COVID-19]), AMPLIFIED PROBE TECHNIQUE, MAKING USE OF HIGH THROUGHPUT TECHNOLOGIES AS DESCRIBED BY CMS-2020-01-R: HCPCS | Performed by: PHYSICIAN ASSISTANT

## 2022-09-03 PROCEDURE — 99283 EMERGENCY DEPT VISIT LOW MDM: CPT

## 2022-09-03 PROCEDURE — 71045 X-RAY EXAM CHEST 1 VIEW: CPT

## 2022-09-03 PROCEDURE — 93010 ELECTROCARDIOGRAM REPORT: CPT | Performed by: INTERNAL MEDICINE

## 2022-09-03 PROCEDURE — 93005 ELECTROCARDIOGRAM TRACING: CPT | Performed by: EMERGENCY MEDICINE

## 2022-09-04 VITALS
OXYGEN SATURATION: 99 % | HEIGHT: 66 IN | RESPIRATION RATE: 18 BRPM | HEART RATE: 72 BPM | SYSTOLIC BLOOD PRESSURE: 138 MMHG | WEIGHT: 225 LBS | TEMPERATURE: 97.4 F | DIASTOLIC BLOOD PRESSURE: 93 MMHG | BODY MASS INDEX: 36.16 KG/M2

## 2022-09-04 LAB
QT INTERVAL: 408 MS
SARS-COV-2 RNA RESP QL NAA+PROBE: NOT DETECTED

## 2022-09-04 NOTE — DISCHARGE INSTRUCTIONS
Home, rest, keep well-hydrated, Tylenol or ibuprofen as needed for fevers, over-the-counter cough syrup as needed.  May take home COVID test in 2 to 3 days if symptoms continue.  Home medicine as prescribed, follow up with PCP for recheck. Return to care if develop worsening shortness of breath, fevers, or with further concerns.

## 2022-09-04 NOTE — ED PROVIDER NOTES
EMERGENCY DEPARTMENT ENCOUNTER    Room Number:  05/05  Date of encounter:  9/4/2022  PCP: Meeta Dickson APRN  Historian: Patient      HPI:  Chief Complaint: Sick  A complete HPI/ROS/PMH/PSH/SH/FH are unobtainable due to: N/A    Context: Alejo Pascal is a 32 y.o. female 1 pack-a-day smoker for 20 years who presents to the ED c/o sick since this morning with body aches, chest tightness, shortness of breath, headaches, cough, sore throat, congestion, and diarrhea.  Patient denies any associated vomiting, fever, or UTI symptoms.  Patient works at [employer] and states that there are coworkers that have been sick.  Patient states that she is not vaccinated against COVID but has had it twice.      PAST MEDICAL HISTORY  Active Ambulatory Problems     Diagnosis Date Noted   • No Active Ambulatory Problems     Resolved Ambulatory Problems     Diagnosis Date Noted   • No Resolved Ambulatory Problems     Past Medical History:   Diagnosis Date   • Anxiety    • Panic attack          PAST SURGICAL HISTORY  Past Surgical History:   Procedure Laterality Date   • CHOLECYSTECTOMY     • TUBAL ABDOMINAL LIGATION           FAMILY HISTORY  History reviewed. No pertinent family history.      SOCIAL HISTORY  Social History     Socioeconomic History   • Marital status: Single   Tobacco Use   • Smoking status: Current Every Day Smoker     Packs/day: 1.50     Years: 15.00     Pack years: 22.50     Types: Cigarettes   • Smokeless tobacco: Never Used   Vaping Use   • Vaping Use: Never used   Substance and Sexual Activity   • Alcohol use: Yes     Comment: 4 mikes hard drinks on days not working, pt works 4 days a week   • Drug use: Not Currently     Frequency: 1.0 times per week     Comment: previously used Ecstacy once a week, until 1 year ago   • Sexual activity: Defer         ALLERGIES  Patient has no known allergies.        REVIEW OF SYSTEMS  Review of Systems     All systems reviewed and negative except for those discussed in  HPI.       PHYSICAL EXAM    I have reviewed the triage vital signs and nursing notes.    ED Triage Vitals [09/03/22 2318]   Temp Heart Rate Resp BP SpO2   97.4 °F (36.3 °C) 95 18 131/86 98 %      Temp src Heart Rate Source Patient Position BP Location FiO2 (%)   Tympanic -- -- -- --       Physical Exam  GENERAL: Alert and orient x3, not distressed  HENT: no pharyngeal erythema or tonsillar exudate, no rhinorrhea, TMs clear  EYES: no scleral icterus, PERRL, EOMI  CV: regular rhythm, regular rate, no murmurs, rubs, or gallops  RESPIRATORY: normal effort, clear to auscultate bilaterally without wheezes, rales, or rhonchi  MUSCULOSKELETAL: no deformity  NEURO: alert, moves all extremities, follows commands  SKIN: warm, dry, no rashes      LAB RESULTS  Recent Results (from the past 24 hour(s))   ECG 12 Lead    Collection Time: 09/03/22 11:38 PM   Result Value Ref Range    QT Interval 408 ms   COVID-19,[facility] IN-HOUSE CEPHEID/SAM NP SWAB IN TRANSPORT MEDIA 8-12 HR TAT - Swab, Nasopharynx    Collection Time: 09/03/22 11:48 PM    Specimen: Nasopharynx; Swab   Result Value Ref Range    COVID19 Not Detected Not Detected - Ref. Range       Ordered the above labs and independently reviewed the results.        RADIOLOGY  XR Chest 1 View    Result Date: 9/3/2022  SINGLE VIEW OF THE CHEST  HISTORY: Cough  COMPARISON: 08/15/2022  FINDINGS: Heart size is within normal limits. No pneumothorax, pleural effusion, or acute infiltrate is seen.      No acute findings.  This report was finalized on 9/3/2022 11:57 PM by Dr. Mirella Gtz M.D.        I ordered the above noted radiological studies. Reviewed by me and discussed with radiologist.  See dictation for official radiology interpretation.      PROCEDURES    Procedures      MEDICATIONS GIVEN IN ER    Medications - No data to display      PROGRESS, DATA ANALYSIS, CONSULTS, AND MEDICAL DECISION MAKING    All labs have been independently reviewed by me.  All radiology studies have  been reviewed by me and discussed with radiologist dictating the report.   EKG's independently viewed and interpreted by me.  Discussion below represents my analysis of pertinent findings related to patient's condition, differential diagnosis, treatment plan and final disposition.    DDx: Includes not limited to COVID, pneumonia, influenza, URI, bronchitis    ED Course as of 09/04/22 0139   Sun Sep 04, 2022   0116 Patient rechecked, resting comfortably in bed, no acute distress.  Discussed results, diagnosis, and treatment plan.  Patient expressed understanding and agrees with plan. [HECTOR]      ED Course User Index  [HECTOR] Zay Coulter PA       MDM: Patient's EKG, chest x-ray, and COVID swab were all negative/unremarkable and showed no evidence for active disease.  We will treat with symptomatic treatment for body aches and cough with over-the-counter medicine as needed.  Patient was given strict return to ER precautions, vital signs are stable, patient is safe for discharge home.    PPE: The patient wore a surgical mask throughout the entire patient encounter. I wore an N95.    AS OF 01:39 EDT VITALS:    BP - 138/93  HR - 72  TEMP - 97.4 °F (36.3 °C) (Tympanic)  O2 SATS - 99%        DIAGNOSIS  Final diagnoses:   Viral URI         DISPOSITION  DISCHARGE    Patient discharged in stable condition.    Reviewed implications of results, diagnosis, meds, responsibility to follow up, warning signs and symptoms of possible worsening, potential complications and reasons to return to ER.    Patient/Family voiced understanding of above instructions.    Discussed plan for discharge, as there is no emergent indication for admission. Patient referred to primary care provider for BP management due to today's BP. Pt/family is agreeable and understands need for follow up and repeat testing.  Pt is aware that discharge does not mean that nothing is wrong but it indicates no emergency is present that requires admission and they must  continue care with follow-up as given below or physician of their choice.     FOLLOW-UP  Meeta Dickson, APRN  [address] [address]  [phone]    Schedule an appointment as soon as possible for a visit in 1 week           Medication List      No changes were made to your prescriptions during this visit.                     Note Disclaimer: At [facility], we believe that sharing information builds trust and better relationships. You are receiving this note because you recently visited [facility]. It is possible you will see health information before a provider has talked with you about it. This kind of information can be easy to misunderstand. To help you fully understand what it means for your health, we urge you to discuss this note with your provider.       Zay Coulter PA  09/04/22 0139

## 2022-09-04 NOTE — ED NOTES
Patient ambulatory to triage c/o body aches, headache, cough beginning this morning. Patient is unsure of any COVID exposures. Patient states she has shortness of breath with activity.

## 2022-09-10 ENCOUNTER — HOSPITAL ENCOUNTER (EMERGENCY)
Facility: HOSPITAL | Age: 32
Discharge: HOME | End: 2022-09-10
Attending: EMERGENCY MEDICINE | Admitting: EMERGENCY MEDICINE

## 2022-09-10 VITALS
OXYGEN SATURATION: 96 % | RESPIRATION RATE: 18 BRPM | HEART RATE: 100 BPM | SYSTOLIC BLOOD PRESSURE: 125 MMHG | DIASTOLIC BLOOD PRESSURE: 89 MMHG | TEMPERATURE: 98.4 F

## 2022-09-10 DIAGNOSIS — S05.01XA ABRASION OF RIGHT CORNEA, INITIAL ENCOUNTER: Primary | ICD-10-CM

## 2022-09-10 PROCEDURE — 99283 EMERGENCY DEPT VISIT LOW MDM: CPT

## 2022-09-10 RX ORDER — PROPARACAINE HYDROCHLORIDE 5 MG/ML
1 SOLUTION/ DROPS OPHTHALMIC ONCE
Status: COMPLETED | OUTPATIENT
Start: 2022-09-10 | End: 2022-09-10

## 2022-09-10 RX ORDER — ERYTHROMYCIN 5 MG/G
OINTMENT OPHTHALMIC EVERY 6 HOURS
Qty: 1 G | Refills: 0 | Status: SHIPPED | OUTPATIENT
Start: 2022-09-10 | End: 2022-09-15

## 2022-09-10 RX ADMIN — FLUORESCEIN SODIUM 1 STRIP: 1 STRIP OPHTHALMIC at 16:23

## 2022-09-10 RX ADMIN — PROPARACAINE HYDROCHLORIDE 1 DROP: 5 SOLUTION/ DROPS OPHTHALMIC at 16:23

## 2022-09-10 NOTE — ED NOTES
"Pt arrived PV. Pt c/o right eye pain. Eye is watering, red, and \"feels swollen.\"     RN wearing appropriate PPE while in triage, patient in mask.  "

## 2022-09-10 NOTE — DISCHARGE INSTRUCTIONS
Please return to the emergency department immediately if you have any trouble with your vision, pain in the right eye, headache, fevers, any swelling, or any other concerning symptoms.    Please call ophthalmology first thing on Monday, 9/12/22, to follow-up with the in 1-2 days. .

## 2022-09-10 NOTE — ED PROVIDER NOTES
Subjective   PIT    32-year-old female with no significant past medical history here for chief complaint of right eye irritation, pain, and blurriness.  History was obtained from the patient.  The patient states that this started yesterday.  Patient states she went to work yesterday and the eye was watering.  She states that she scratched her little bit and feels like something stuck in there.  She states that today she woke up and had mild blurriness and redness in the conjunctivo-.  This concerned her therefore she came to the ED.  Patient denies any previous history of injuries to the eye.  Patient denies any headaches, fevers, chest pain, shortness of breath, abdominal pain, nausea, vomiting, diarrhea, urinary symptoms, or any other symptoms.  Patient states that if it was not for the she would be here.          Review of Systems   All other systems reviewed and are negative.      Past Medical History:   Diagnosis Date   • Anxiety    • Panic attack        No Known Allergies    Past Surgical History:   Procedure Laterality Date   • CHOLECYSTECTOMY     • TUBAL ABDOMINAL LIGATION         No family history on file.    Social History     Socioeconomic History   • Marital status: Single   Tobacco Use   • Smoking status: Current Every Day Smoker     Packs/day: 1.50     Years: 15.00     Pack years: 22.50     Types: Cigarettes   • Smokeless tobacco: Never Used   Vaping Use   • Vaping Use: Never used   Substance and Sexual Activity   • Alcohol use: Yes     Comment: 4 mikes hard drinks on days not working, pt works 4 days a week   • Drug use: Not Currently     Frequency: 1.0 times per week     Comment: previously used Ecstacy once a week, until 1 year ago   • Sexual activity: Defer           Objective   Physical Exam  Constitutional:       General: She is not in acute distress.     Appearance: She is not ill-appearing, toxic-appearing or diaphoretic.   HENT:      Head: Normocephalic and atraumatic.      Right Ear: External  ear normal.      Left Ear: External ear normal.      Nose: Nose normal. No congestion or rhinorrhea.      Mouth/Throat:      Mouth: Mucous membranes are moist.      Pharynx: Oropharynx is clear. No oropharyngeal exudate or posterior oropharyngeal erythema.   Eyes:      General: No scleral icterus.        Right eye: Discharge present.         Left eye: No discharge.      Extraocular Movements: Extraocular movements intact.      Pupils: Pupils are equal, round, and reactive to light.      Comments: Right eye has conjunctival injection, left eye is normal conjunctival    Right eye vision is 20/30  Left eye vision is 20/20   Cardiovascular:      Rate and Rhythm: Normal rate and regular rhythm.      Pulses: Normal pulses.      Heart sounds: Normal heart sounds. No murmur heard.    No friction rub. No gallop.   Pulmonary:      Effort: Pulmonary effort is normal. No respiratory distress.      Breath sounds: Normal breath sounds. No stridor. No wheezing, rhonchi or rales.   Chest:      Chest wall: No tenderness.   Abdominal:      General: There is no distension.      Palpations: Abdomen is soft.      Tenderness: There is no abdominal tenderness. There is no right CVA tenderness, left CVA tenderness, guarding or rebound.   Musculoskeletal:         General: No swelling, tenderness, deformity or signs of injury. Normal range of motion.      Cervical back: Normal range of motion and neck supple. No rigidity or tenderness.      Right lower leg: No edema.      Left lower leg: No edema.   Skin:     General: Skin is warm and dry.      Coloration: Skin is not jaundiced or pale.   Neurological:      Mental Status: She is alert and oriented to person, place, and time.      Gait: Gait normal.   Psychiatric:         Mood and Affect: Mood normal.         Behavior: Behavior normal.         Procedures           ED Course                                           MDM  Number of Diagnoses or Management Options  Risk of Complications,  Morbidity, and/or Mortality  General comments: When I first saw the patient, the patient appeared nontoxic.  Patient was complaining of pain in the right thigh.  Patient did have some conjunctival injection.  Patient's vision was 20/30.  Patient had extraocular movements intact.  She did tell me that nothing flew into her eye while she was at work since she works at [employer].  I did order proparacaine and fluorescein strip.  When I stained the eye the patient did have a corneal abrasion at approximately 10 o'clock position that was not in the patient's visual field.  Patient's peripheral vision was completely intact in both eyes.  I did do a Woods lamp exam and saw the corneal abrasion.  After the proparacaine the patient was feeling better.  I also did a slit-lamp exam.  I did flip the patient's eyelid over and did not see any foreign bodies in the upper of the lower eyelid.  I did not see a rust ring.  Patient had no headaches.  Patient's pupils were equal and reactive bilaterally.  Patient was told to follow-up with ophthalmology on Monday.  Patient was given strict return precautions.  All questions were answered.  Patient was given a prescription for erythromycin eye ointment to be used 4 times a day for 5 days.  Patient thanked me for the care and was discharged in improved condition.  All questions were answered prior to discharge.    Patient Progress  Patient progress: improved      Final diagnoses:   Abrasion of right cornea, initial encounter       ED Disposition  ED Disposition     ED Disposition   Discharge    Condition   Stable    Comment   --             eMeta Dickson, APRN  [address] [id number] [phone]    Schedule an appointment as soon as possible for a visit in 2 days      Jassi Carlos MD  [address] [place], [place] [id number] [phone]    Schedule an appointment as soon as possible for a visit in 2 days           Medication List      New Prescriptions     erythromycin 5 MG/GM ophthalmic ointment  Commonly known as: ROMYCIN  Administer  to the right eye Every 6 (Six) Hours for 5 days.           Where to Get Your Medications      You can get these medications from any pharmacy    Bring a paper prescription for each of these medications  · erythromycin 5 MG/GM ophthalmic ointment          Mervin Ospina MD  09/10/22 2589

## 2022-10-07 ENCOUNTER — HOSPITAL ENCOUNTER (EMERGENCY)
Facility: HOSPITAL | Age: 32
Discharge: HOME | End: 2022-10-07
Attending: EMERGENCY MEDICINE | Admitting: EMERGENCY MEDICINE

## 2022-10-07 ENCOUNTER — APPOINTMENT (OUTPATIENT)
Dept: GENERAL RADIOLOGY | Facility: HOSPITAL | Age: 32
End: 2022-10-07

## 2022-10-07 VITALS
BODY MASS INDEX: 36.48 KG/M2 | HEART RATE: 83 BPM | WEIGHT: 227 LBS | HEIGHT: 66 IN | RESPIRATION RATE: 18 BRPM | TEMPERATURE: 98.1 F | SYSTOLIC BLOOD PRESSURE: 111 MMHG | OXYGEN SATURATION: 96 % | DIASTOLIC BLOOD PRESSURE: 69 MMHG

## 2022-10-07 DIAGNOSIS — R07.81 PLEURITIC CHEST PAIN: Primary | ICD-10-CM

## 2022-10-07 LAB
ALBUMIN SERPL-MCNC: 4.4 G/DL (ref 3.5–5.2)
ALBUMIN/GLOB SERPL: 1.6 G/DL
ALP SERPL-CCNC: 84 U/L (ref 39–117)
ALT SERPL W P-5'-P-CCNC: 18 U/L (ref 1–33)
ANION GAP SERPL CALCULATED.3IONS-SCNC: 10.1 MMOL/L (ref 5–15)
AST SERPL-CCNC: 14 U/L (ref 1–32)
BASOPHILS # BLD AUTO: 0.08 10*3/MM3 (ref 0–0.2)
BASOPHILS NFR BLD AUTO: 1 % (ref 0–1.5)
BILIRUB SERPL-MCNC: <0.2 MG/DL (ref 0–1.2)
BUN SERPL-MCNC: 20 MG/DL (ref 6–20)
BUN/CREAT SERPL: 27.8 (ref 7–25)
CALCIUM SPEC-SCNC: 9.7 MG/DL (ref 8.6–10.5)
CHLORIDE SERPL-SCNC: 103 MMOL/L (ref 98–107)
CO2 SERPL-SCNC: 27.9 MMOL/L (ref 22–29)
CREAT SERPL-MCNC: 0.72 MG/DL (ref 0.57–1)
DEPRECATED RDW RBC AUTO: 40.7 FL (ref 37–54)
EGFRCR SERPLBLD CKD-EPI 2021: 113.4 ML/MIN/1.73
EOSINOPHIL # BLD AUTO: 0.2 10*3/MM3 (ref 0–0.4)
EOSINOPHIL NFR BLD AUTO: 2.5 % (ref 0.3–6.2)
ERYTHROCYTE [DISTWIDTH] IN BLOOD BY AUTOMATED COUNT: 12.4 % (ref 12.3–15.4)
GLOBULIN UR ELPH-MCNC: 2.8 GM/DL
GLUCOSE SERPL-MCNC: 88 MG/DL (ref 65–99)
HCG SERPL QL: NEGATIVE
HCT VFR BLD AUTO: 40.4 % (ref 34–46.6)
HGB BLD-MCNC: 13.1 G/DL (ref 12–15.9)
HOLD SPECIMEN: NORMAL
IMM GRANULOCYTES # BLD AUTO: 0.04 10*3/MM3 (ref 0–0.05)
IMM GRANULOCYTES NFR BLD AUTO: 0.5 % (ref 0–0.5)
LYMPHOCYTES # BLD AUTO: 1.17 10*3/MM3 (ref 0.7–3.1)
LYMPHOCYTES NFR BLD AUTO: 14.6 % (ref 19.6–45.3)
MCH RBC QN AUTO: 29.4 PG (ref 26.6–33)
MCHC RBC AUTO-ENTMCNC: 32.4 G/DL (ref 31.5–35.7)
MCV RBC AUTO: 90.6 FL (ref 79–97)
MONOCYTES # BLD AUTO: 0.95 10*3/MM3 (ref 0.1–0.9)
MONOCYTES NFR BLD AUTO: 11.8 % (ref 5–12)
NEUTROPHILS NFR BLD AUTO: 5.6 10*3/MM3 (ref 1.7–7)
NEUTROPHILS NFR BLD AUTO: 69.6 % (ref 42.7–76)
NRBC BLD AUTO-RTO: 0 /100 WBC (ref 0–0.2)
PLATELET # BLD AUTO: 253 10*3/MM3 (ref 140–450)
PMV BLD AUTO: 10 FL (ref 6–12)
POTASSIUM SERPL-SCNC: 3.4 MMOL/L (ref 3.5–5.2)
PROT SERPL-MCNC: 7.2 G/DL (ref 6–8.5)
QT INTERVAL: 385 MS
RBC # BLD AUTO: 4.46 10*6/MM3 (ref 3.77–5.28)
SODIUM SERPL-SCNC: 141 MMOL/L (ref 136–145)
TROPONIN T SERPL-MCNC: <0.01 NG/ML (ref 0–0.03)
WBC NRBC COR # BLD: 8.04 10*3/MM3 (ref 3.4–10.8)
WHOLE BLOOD HOLD COAG: NORMAL
WHOLE BLOOD HOLD SPECIMEN: NORMAL

## 2022-10-07 PROCEDURE — 71046 X-RAY EXAM CHEST 2 VIEWS: CPT

## 2022-10-07 PROCEDURE — 85025 COMPLETE CBC W/AUTO DIFF WBC: CPT

## 2022-10-07 PROCEDURE — 93010 ELECTROCARDIOGRAM REPORT: CPT | Performed by: INTERNAL MEDICINE

## 2022-10-07 PROCEDURE — 80053 COMPREHEN METABOLIC PANEL: CPT

## 2022-10-07 PROCEDURE — 99284 EMERGENCY DEPT VISIT MOD MDM: CPT

## 2022-10-07 PROCEDURE — 84703 CHORIONIC GONADOTROPIN ASSAY: CPT

## 2022-10-07 PROCEDURE — 84484 ASSAY OF TROPONIN QUANT: CPT

## 2022-10-07 PROCEDURE — 93005 ELECTROCARDIOGRAM TRACING: CPT

## 2022-10-07 PROCEDURE — 36415 COLL VENOUS BLD VENIPUNCTURE: CPT

## 2022-10-07 PROCEDURE — 93005 ELECTROCARDIOGRAM TRACING: CPT | Performed by: EMERGENCY MEDICINE

## 2022-10-07 RX ORDER — IBUPROFEN 800 MG/1
800 TABLET ORAL ONCE
Status: COMPLETED | OUTPATIENT
Start: 2022-10-07 | End: 2022-10-07

## 2022-10-07 RX ORDER — SODIUM CHLORIDE 0.9 % (FLUSH) 0.9 %
10 SYRINGE (ML) INJECTION AS NEEDED
Status: DISCONTINUED | OUTPATIENT
Start: 2022-10-07 | End: 2022-10-07 | Stop reason: HOSPADM

## 2022-10-07 RX ORDER — ASPIRIN 325 MG
325 TABLET ORAL ONCE
Status: DISCONTINUED | OUTPATIENT
Start: 2022-10-07 | End: 2022-10-07 | Stop reason: HOSPADM

## 2022-10-07 RX ORDER — IBUPROFEN 800 MG/1
800 TABLET ORAL EVERY 8 HOURS PRN
Qty: 21 TABLET | Refills: 0 | Status: SHIPPED | OUTPATIENT
Start: 2022-10-07

## 2022-10-07 RX ADMIN — IBUPROFEN 800 MG: 800 TABLET, FILM COATED ORAL at 07:23

## 2022-10-07 NOTE — ED TRIAGE NOTES
Pt to ER from home with c/o chest pain that radiates down left arm that started around 1800 yesterday. No cardiac history. Pt states shes been battling a lung infection for the last 2 months. Pt states pain is 6/10. Pt states she is nauseous. Pt to triage with mask on along with nursing staff.  
DISPLAY PLAN FREE TEXT

## 2022-10-07 NOTE — ED PROVIDER NOTES
EMERGENCY DEPARTMENT ENCOUNTER    Room Number:  11/11  Date of encounter:  10/7/2022  PCP: Meeta Dickson APRN  Patient Care Team:  Meeta Dickson APRN as PCP - General (Nurse Practitioner)   Historian: Patient    HPI:  Chief Complaint: Chest pain  A complete HPI/ROS/PMH/PSH/SH/FH are unobtainable due to: Nothing    Context: Alejo Pascal is a 32 y.o. female who presents to the ED c/o chest pain since 6 PM last night.  She reports that when she got to work she developed pain in her left chest anteriorly.  She reports it is worse with deep breathing.  She has never had similar episodes in the past.  She denies any vomiting.  She does report nausea and shortness of breath.  She states she is now currently on her fourth round of antibiotics for a lung infection that she has had since COVID in June.  She has never had a blood clot.  She denies any recent surgeries or long travel.  She is not on oral contraceptives.  She denies any cardiac history.  Her pain is been constant since the onset last night.  It does not radiate.    Prior record review: ER visit 9/10/2022 for right eye irritation and blurriness    PAST MEDICAL HISTORY  Active Ambulatory Problems     Diagnosis Date Noted   • No Active Ambulatory Problems     Resolved Ambulatory Problems     Diagnosis Date Noted   • No Resolved Ambulatory Problems     Past Medical History:   Diagnosis Date   • Anxiety    • Panic attack        The patient qualifies to receive the vaccine, but they have not yet received it.    PAST SURGICAL HISTORY  Past Surgical History:   Procedure Laterality Date   • CHOLECYSTECTOMY     • TUBAL ABDOMINAL LIGATION           FAMILY HISTORY  No family history on file.      SOCIAL HISTORY  Social History     Socioeconomic History   • Marital status: Single   Tobacco Use   • Smoking status: Current Every Day Smoker     Packs/day: 1.50     Years: 15.00     Pack years: 22.50     Types: Cigarettes   • Smokeless tobacco: Never Used   Vaping Use    • Vaping Use: Never used   Substance and Sexual Activity   • Alcohol use: Yes     Comment: 4 mikes hard drinks on days not working, pt works 4 days a week   • Drug use: Not Currently     Frequency: 1.0 times per week     Comment: previously used Ecstacy once a week, until 1 year ago   • Sexual activity: Defer         ALLERGIES  Patient has no known allergies.        REVIEW OF SYSTEMS  Review of Systems   Positive chest pain, positive shortness of breath, positive nausea, no fever, no chills, no headache, no abdominal pain  All systems reviewed and negative except for those discussed in HPI.       PHYSICAL EXAM    I have reviewed the triage vital signs and nursing notes.    ED Triage Vitals [10/07/22 0517]   Temp Heart Rate Resp BP SpO2   98.1 °F (36.7 °C) 85 18 133/91 97 %      Temp src Heart Rate Source Patient Position BP Location FiO2 (%)   -- -- -- -- --       Physical Exam  GENERAL: Awake, alert, no acute distress  SKIN: Warm, dry  HENT: Normocephalic, atraumatic  EYES: no scleral icterus  CV: regular rhythm, regular rate  RESPIRATORY: normal effort, lungs clear  ABDOMEN: soft, nontender, nondistended  MUSCULOSKELETAL: no deformity, no calf tenderness or swelling  NEURO: alert, moves all extremities, follows commands          LAB RESULTS  Recent Results (from the past 24 hour(s))   FOLLICLE STIMULATING HORMONE    Collection Time: 10/06/22  9:25 AM    Specimen type and source: Serum, Blood   Result Value Ref Range    FSH 7.4 1.4 - 16.7 m(iU)/mL   ESTRADIOL    Collection Time: 10/06/22  9:25 AM    Specimen: Fresh Frozen Plasma    Specimen type and source: Plasma, Blood   Result Value Ref Range    Estradiol 50.0 pg/mL pg/mL   ECG 12 Lead    Collection Time: 10/07/22  5:26 AM   Result Value Ref Range    QT Interval 385 ms   Comprehensive Metabolic Panel    Collection Time: 10/07/22  5:40 AM    Specimen: Blood   Result Value Ref Range    Glucose 88 65 - 99 mg/dL    BUN 20 6 - 20 mg/dL    Creatinine 0.72 0.57 -  1.00 mg/dL    Sodium 141 136 - 145 mmol/L    Potassium 3.4 (L) 3.5 - 5.2 mmol/L    Chloride 103 98 - 107 mmol/L    CO2 27.9 22.0 - 29.0 mmol/L    Calcium 9.7 8.6 - 10.5 mg/dL    Total Protein 7.2 6.0 - 8.5 g/dL    Albumin 4.40 3.50 - 5.20 g/dL    ALT (SGPT) 18 1 - 33 U/L    AST (SGOT) 14 1 - 32 U/L    Alkaline Phosphatase 84 39 - 117 U/L    Total Bilirubin <0.2 0.0 - 1.2 mg/dL    Globulin 2.8 gm/dL    A/G Ratio 1.6 g/dL    BUN/Creatinine Ratio 27.8 (H) 7.0 - 25.0    Anion Gap 10.1 5.0 - 15.0 mmol/L    eGFR 113.4 >60.0 mL/min/1.73   Troponin    Collection Time: 10/07/22  5:40 AM    Specimen: Blood   Result Value Ref Range    Troponin T <0.010 0.000 - 0.030 ng/mL   hCG, Serum, Qualitative    Collection Time: 10/07/22  5:40 AM    Specimen: Blood   Result Value Ref Range    HCG Qualitative Negative Negative   Green Top (Gel)    Collection Time: 10/07/22  5:40 AM   Result Value Ref Range    Extra Tube Hold for add-ons.    Lavender Top    Collection Time: 10/07/22  5:40 AM   Result Value Ref Range    Extra Tube hold for add-on    Light Blue Top    Collection Time: 10/07/22  5:40 AM   Result Value Ref Range    Extra Tube Hold for add-ons.    CBC Auto Differential    Collection Time: 10/07/22  5:40 AM    Specimen: Blood   Result Value Ref Range    WBC 8.04 3.40 - 10.80 10*3/mm3    RBC 4.46 3.77 - 5.28 10*6/mm3    Hemoglobin 13.1 12.0 - 15.9 g/dL    Hematocrit 40.4 34.0 - 46.6 %    MCV 90.6 79.0 - 97.0 fL    MCH 29.4 26.6 - 33.0 pg    MCHC 32.4 31.5 - 35.7 g/dL    RDW 12.4 12.3 - 15.4 %    RDW-SD 40.7 37.0 - 54.0 fl    MPV 10.0 6.0 - 12.0 fL    Platelets 253 140 - 450 10*3/mm3    Neutrophil % 69.6 42.7 - 76.0 %    Lymphocyte % 14.6 (L) 19.6 - 45.3 %    Monocyte % 11.8 5.0 - 12.0 %    Eosinophil % 2.5 0.3 - 6.2 %    Basophil % 1.0 0.0 - 1.5 %    Immature Grans % 0.5 0.0 - 0.5 %    Neutrophils, Absolute 5.60 1.70 - 7.00 10*3/mm3    Lymphocytes, Absolute 1.17 0.70 - 3.10 10*3/mm3    Monocytes, Absolute 0.95 (H) 0.10 - 0.90  10*3/mm3    Eosinophils, Absolute 0.20 0.00 - 0.40 10*3/mm3    Basophils, Absolute 0.08 0.00 - 0.20 10*3/mm3    Immature Grans, Absolute 0.04 0.00 - 0.05 10*3/mm3    nRBC 0.0 0.0 - 0.2 /100 WBC       Ordered the above labs and independently reviewed the results.        RADIOLOGY  XR Chest 2 View    Result Date: 10/7/2022  PA AND LATERAL CHEST RADIOGRAPH  HISTORY: Chest pain  COMPARISON: 09/03/2022  FINDINGS: Heart size is within normal limits. No pneumothorax, pleural effusion, or acute infiltrate is seen.      No acute findings.  This report was finalized on 10/7/2022 6:01 AM by Dr. Mirella Gtz M.D.        I ordered the above noted radiological studies. Reviewed by me and discussed with radiologist.  See dictation for official radiology interpretation.      PROCEDURES    Procedures      MEDICATIONS GIVEN IN ER    Medications   sodium chloride 0.9 % flush 10 mL (has no administration in time range)   aspirin tablet 325 mg (has no administration in time range)   ibuprofen (ADVIL,MOTRIN) tablet 800 mg (has no administration in time range)         PROGRESS, DATA ANALYSIS, CONSULTS, AND MEDICAL DECISION MAKING    All labs have been independently reviewed by me.  All radiology studies have been reviewed by me and discussed with radiologist dictating the report.   EKG's independently viewed and interpreted by me.  Discussion below represents my analysis of pertinent findings related to patient's condition, differential diagnosis, treatment plan and final disposition.    Differential diagnosis includes but is not limited to PE, pneumonia, pleurisy, pneumothorax, acute aortic syndrome, non-STEMI, STEMI.    ED Course as of 10/07/22 0719   Fri Oct 07, 2022   0715 PERC score 0 [TR]   0715 EKG          EKG time: 520  Rhythm/Rate: Normal sinus, rate 83  P waves and TN: Normal P, normal TN  QRS, axis: Narrow QRS,    ST and T waves: No acute    Interpreted Contemporaneously by me, independently viewed  Not significantly  changed compared to prior 9/3/2022   [TR]   0718 The patient's chest x-ray is clear.  Her troponin is negative.  She has had pain constantly since 6 PM last night which is essentially 12 hours prior to her troponin.  No further cardiac work-up is indicated.  Her EKG is normal.  Her PERC score is 0 which puts her at very low risk for PE.  She is a smoker and I have counseled her to quit smoking in order to improve her symptoms and overall health.  Plan to put her on anti-inflammatory medication to help with the pain.  I see no signs of infectious symptoms. [TR]      ED Course User Index  [TR] James Olivas MD           PPE: The patient wore a mask and I wore an N95 mask throughout the entire patient encounter.       AS OF 07:19 EDT VITALS:    BP - 111/69  HR - 79  TEMP - 98.1 °F (36.7 °C)  O2 SATS - 96%        DIAGNOSIS  Final diagnoses:   Pleuritic chest pain         DISPOSITION  ED Disposition     ED Disposition   Discharge    Condition   Stable    Comment   --                Note Disclaimer: At [facility], we believe that sharing information builds trust and better relationships. You are receiving this note because you recently visited [facility]. It is possible you will see health information before a provider has talked with you about it. This kind of information can be easy to misunderstand. To help you fully understand what it means for your health, we urge you to discuss this note with your provider.       James Olivas MD  10/07/22 0719

## 2022-10-07 NOTE — DISCHARGE INSTRUCTIONS
Take medication as prescribed to help with the inflammation and pain.  Return here immediately if you develop worsened shortness of breath, uncontrolled vomiting, fever over 100.4.

## 2023-04-04 ENCOUNTER — APPOINTMENT (OUTPATIENT)
Dept: GENERAL RADIOLOGY | Facility: HOSPITAL | Age: 33
End: 2023-04-04
Payer: COMMERCIAL

## 2023-04-04 ENCOUNTER — HOSPITAL ENCOUNTER (EMERGENCY)
Facility: HOSPITAL | Age: 33
Discharge: HOME | End: 2023-04-05
Attending: EMERGENCY MEDICINE | Admitting: EMERGENCY MEDICINE
Payer: COMMERCIAL

## 2023-04-04 DIAGNOSIS — J18.9 PNEUMONIA DUE TO INFECTIOUS ORGANISM, UNSPECIFIED LATERALITY, UNSPECIFIED PART OF LUNG: Primary | ICD-10-CM

## 2023-04-04 DIAGNOSIS — R10.30 LOWER ABDOMINAL PAIN: ICD-10-CM

## 2023-04-04 LAB
BASOPHILS # BLD AUTO: 0.08 10*3/MM3 (ref 0–0.2)
BASOPHILS NFR BLD AUTO: 0.9 % (ref 0–1.5)
DEPRECATED RDW RBC AUTO: 41.9 FL (ref 37–54)
EOSINOPHIL # BLD AUTO: 0.15 10*3/MM3 (ref 0–0.4)
EOSINOPHIL NFR BLD AUTO: 1.7 % (ref 0.3–6.2)
ERYTHROCYTE [DISTWIDTH] IN BLOOD BY AUTOMATED COUNT: 13.1 % (ref 12.3–15.4)
HCT VFR BLD AUTO: 42 % (ref 34–46.6)
HGB BLD-MCNC: 14.1 G/DL (ref 12–15.9)
IMM GRANULOCYTES # BLD AUTO: 0.04 10*3/MM3 (ref 0–0.05)
IMM GRANULOCYTES NFR BLD AUTO: 0.5 % (ref 0–0.5)
LYMPHOCYTES # BLD AUTO: 1.37 10*3/MM3 (ref 0.7–3.1)
LYMPHOCYTES NFR BLD AUTO: 15.6 % (ref 19.6–45.3)
MCH RBC QN AUTO: 29.7 PG (ref 26.6–33)
MCHC RBC AUTO-ENTMCNC: 33.6 G/DL (ref 31.5–35.7)
MCV RBC AUTO: 88.4 FL (ref 79–97)
MONOCYTES # BLD AUTO: 0.96 10*3/MM3 (ref 0.1–0.9)
MONOCYTES NFR BLD AUTO: 10.9 % (ref 5–12)
NEUTROPHILS NFR BLD AUTO: 6.2 10*3/MM3 (ref 1.7–7)
NEUTROPHILS NFR BLD AUTO: 70.4 % (ref 42.7–76)
NRBC BLD AUTO-RTO: 0 /100 WBC (ref 0–0.2)
PLATELET # BLD AUTO: 270 10*3/MM3 (ref 140–450)
PMV BLD AUTO: 9.9 FL (ref 6–12)
RBC # BLD AUTO: 4.75 10*6/MM3 (ref 3.77–5.28)
WBC NRBC COR # BLD: 8.8 10*3/MM3 (ref 3.4–10.8)

## 2023-04-04 PROCEDURE — 80053 COMPREHEN METABOLIC PANEL: CPT | Performed by: PHYSICIAN ASSISTANT

## 2023-04-04 PROCEDURE — 94640 AIRWAY INHALATION TREATMENT: CPT

## 2023-04-04 PROCEDURE — 85025 COMPLETE CBC W/AUTO DIFF WBC: CPT | Performed by: PHYSICIAN ASSISTANT

## 2023-04-04 PROCEDURE — 36415 COLL VENOUS BLD VENIPUNCTURE: CPT

## 2023-04-04 PROCEDURE — 94799 UNLISTED PULMONARY SVC/PX: CPT

## 2023-04-04 PROCEDURE — 96374 THER/PROPH/DIAG INJ IV PUSH: CPT

## 2023-04-04 PROCEDURE — 99284 EMERGENCY DEPT VISIT MOD MDM: CPT

## 2023-04-04 PROCEDURE — 83690 ASSAY OF LIPASE: CPT | Performed by: PHYSICIAN ASSISTANT

## 2023-04-04 PROCEDURE — 87637 SARSCOV2&INF A&B&RSV AMP PRB: CPT | Performed by: PHYSICIAN ASSISTANT

## 2023-04-04 PROCEDURE — 71045 X-RAY EXAM CHEST 1 VIEW: CPT

## 2023-04-04 PROCEDURE — 25010000002 KETOROLAC TROMETHAMINE PER 15 MG: Performed by: PHYSICIAN ASSISTANT

## 2023-04-04 PROCEDURE — 84703 CHORIONIC GONADOTROPIN ASSAY: CPT | Performed by: PHYSICIAN ASSISTANT

## 2023-04-04 RX ORDER — SODIUM CHLORIDE 0.9 % (FLUSH) 0.9 %
10 SYRINGE (ML) INJECTION AS NEEDED
Status: DISCONTINUED | OUTPATIENT
Start: 2023-04-04 | End: 2023-04-05 | Stop reason: HOSPADM

## 2023-04-04 RX ORDER — ALBUTEROL SULFATE 2.5 MG/3ML
2.5 SOLUTION RESPIRATORY (INHALATION) ONCE
Status: COMPLETED | OUTPATIENT
Start: 2023-04-04 | End: 2023-04-04

## 2023-04-04 RX ORDER — KETOROLAC TROMETHAMINE 15 MG/ML
15 INJECTION, SOLUTION INTRAMUSCULAR; INTRAVENOUS ONCE
Status: COMPLETED | OUTPATIENT
Start: 2023-04-04 | End: 2023-04-04

## 2023-04-04 RX ADMIN — ALBUTEROL SULFATE 2.5 MG: 2.5 SOLUTION RESPIRATORY (INHALATION) at 23:55

## 2023-04-04 RX ADMIN — KETOROLAC TROMETHAMINE 15 MG: 15 INJECTION, SOLUTION INTRAMUSCULAR; INTRAVENOUS at 23:45

## 2023-04-04 RX ADMIN — SODIUM CHLORIDE 1000 ML: 9 INJECTION, SOLUTION INTRAVENOUS at 23:44

## 2023-04-04 NOTE — Clinical Note
[facility] EMERGENCY DEPARTMENT  [address] [facility] [address]  Phone: [phone]    Alejo Pascal was seen and treated in our emergency department on 4/4/2023.  She may return to work on 04/10/2023.         Thank you for choosing [facility].    Poli Hayden III, PA

## 2023-04-04 NOTE — Clinical Note
[facility] EMERGENCY DEPARTMENT  [address] [facility] [address]  Phone: [phone]    Alejo Pascal was seen and treated in our emergency department on 4/4/2023.  She may return to work on 04/10/2023.         Thank you for choosing [facility].    Poli Hayden III, PA

## 2023-04-05 ENCOUNTER — APPOINTMENT (OUTPATIENT)
Dept: CT IMAGING | Facility: HOSPITAL | Age: 33
End: 2023-04-05
Payer: COMMERCIAL

## 2023-04-05 VITALS
OXYGEN SATURATION: 98 % | HEART RATE: 90 BPM | WEIGHT: 225 LBS | SYSTOLIC BLOOD PRESSURE: 136 MMHG | BODY MASS INDEX: 36.16 KG/M2 | TEMPERATURE: 98.6 F | HEIGHT: 66 IN | RESPIRATION RATE: 16 BRPM | DIASTOLIC BLOOD PRESSURE: 72 MMHG

## 2023-04-05 LAB
ALBUMIN SERPL-MCNC: 4 G/DL (ref 3.5–5.2)
ALBUMIN/GLOB SERPL: 1.1 G/DL
ALP SERPL-CCNC: 102 U/L (ref 39–117)
ALT SERPL W P-5'-P-CCNC: 20 U/L (ref 1–33)
ANION GAP SERPL CALCULATED.3IONS-SCNC: 9 MMOL/L (ref 5–15)
AST SERPL-CCNC: 15 U/L (ref 1–32)
BACTERIA UR QL AUTO: ABNORMAL /HPF
BILIRUB SERPL-MCNC: <0.2 MG/DL (ref 0–1.2)
BILIRUB UR QL STRIP: NEGATIVE
BUN SERPL-MCNC: 16 MG/DL (ref 6–20)
BUN/CREAT SERPL: 23.9 (ref 7–25)
CALCIUM SPEC-SCNC: 9.2 MG/DL (ref 8.6–10.5)
CHLORIDE SERPL-SCNC: 102 MMOL/L (ref 98–107)
CLARITY UR: ABNORMAL
CO2 SERPL-SCNC: 26 MMOL/L (ref 22–29)
COLOR UR: YELLOW
CREAT SERPL-MCNC: 0.67 MG/DL (ref 0.57–1)
EGFRCR SERPLBLD CKD-EPI 2021: 118.5 ML/MIN/1.73
FLUAV RNA RESP QL NAA+PROBE: NOT DETECTED
FLUBV RNA RESP QL NAA+PROBE: NOT DETECTED
GLOBULIN UR ELPH-MCNC: 3.6 GM/DL
GLUCOSE SERPL-MCNC: 101 MG/DL (ref 65–99)
GLUCOSE UR STRIP-MCNC: NEGATIVE MG/DL
HCG SERPL QL: NEGATIVE
HGB UR QL STRIP.AUTO: NEGATIVE
HYALINE CASTS UR QL AUTO: ABNORMAL /LPF
KETONES UR QL STRIP: NEGATIVE
LEUKOCYTE ESTERASE UR QL STRIP.AUTO: ABNORMAL
LIPASE SERPL-CCNC: 36 U/L (ref 13–60)
NITRITE UR QL STRIP: NEGATIVE
PH UR STRIP.AUTO: 6 [PH] (ref 5–8)
POTASSIUM SERPL-SCNC: 4.2 MMOL/L (ref 3.5–5.2)
PROT SERPL-MCNC: 7.6 G/DL (ref 6–8.5)
PROT UR QL STRIP: NEGATIVE
RBC # UR STRIP: ABNORMAL /HPF
REF LAB TEST METHOD: ABNORMAL
RSV RNA NPH QL NAA+NON-PROBE: NOT DETECTED
SARS-COV-2 RNA RESP QL NAA+PROBE: NOT DETECTED
SODIUM SERPL-SCNC: 137 MMOL/L (ref 136–145)
SP GR UR STRIP: 1.03 (ref 1–1.03)
SQUAMOUS #/AREA URNS HPF: ABNORMAL /HPF
UROBILINOGEN UR QL STRIP: ABNORMAL
WBC # UR STRIP: ABNORMAL /HPF

## 2023-04-05 PROCEDURE — 96375 TX/PRO/DX INJ NEW DRUG ADDON: CPT

## 2023-04-05 PROCEDURE — 25010000002 ONDANSETRON PER 1 MG: Performed by: PHYSICIAN ASSISTANT

## 2023-04-05 PROCEDURE — 74177 CT ABD & PELVIS W/CONTRAST: CPT

## 2023-04-05 PROCEDURE — 81001 URINALYSIS AUTO W/SCOPE: CPT | Performed by: PHYSICIAN ASSISTANT

## 2023-04-05 PROCEDURE — 25510000001 IOPAMIDOL 61 % SOLUTION: Performed by: EMERGENCY MEDICINE

## 2023-04-05 PROCEDURE — 25010000002 MORPHINE PER 10 MG: Performed by: EMERGENCY MEDICINE

## 2023-04-05 RX ORDER — FLUTICASONE PROPIONATE AND SALMETEROL 500; 50 UG/1; UG/1
1 POWDER RESPIRATORY (INHALATION)
Qty: 1 EACH | Refills: 2 | Status: SHIPPED | OUTPATIENT
Start: 2023-04-05

## 2023-04-05 RX ORDER — DOXYCYCLINE 100 MG/1
100 CAPSULE ORAL 2 TIMES DAILY
Qty: 20 CAPSULE | Refills: 0 | Status: SHIPPED | OUTPATIENT
Start: 2023-04-05 | End: 2023-04-05 | Stop reason: SDUPTHER

## 2023-04-05 RX ORDER — ALBUTEROL SULFATE 90 UG/1
2 AEROSOL, METERED RESPIRATORY (INHALATION) EVERY 6 HOURS PRN
Qty: 18 G | Refills: 0 | Status: SHIPPED | OUTPATIENT
Start: 2023-04-05 | End: 2023-04-05 | Stop reason: SDUPTHER

## 2023-04-05 RX ORDER — MORPHINE SULFATE 2 MG/ML
4 INJECTION, SOLUTION INTRAMUSCULAR; INTRAVENOUS ONCE
Status: COMPLETED | OUTPATIENT
Start: 2023-04-05 | End: 2023-04-05

## 2023-04-05 RX ORDER — ONDANSETRON 2 MG/ML
4 INJECTION INTRAMUSCULAR; INTRAVENOUS ONCE
Status: COMPLETED | OUTPATIENT
Start: 2023-04-05 | End: 2023-04-05

## 2023-04-05 RX ORDER — ALBUTEROL SULFATE 90 UG/1
2 AEROSOL, METERED RESPIRATORY (INHALATION) EVERY 6 HOURS PRN
Qty: 18 G | Refills: 2 | Status: SHIPPED | OUTPATIENT
Start: 2023-04-05

## 2023-04-05 RX ORDER — ONDANSETRON 2 MG/ML
4 INJECTION INTRAMUSCULAR; INTRAVENOUS ONCE
Status: DISCONTINUED | OUTPATIENT
Start: 2023-04-05 | End: 2023-04-05

## 2023-04-05 RX ORDER — DOXYCYCLINE 100 MG/1
100 CAPSULE ORAL 2 TIMES DAILY
Qty: 20 CAPSULE | Refills: 0 | Status: SHIPPED | OUTPATIENT
Start: 2023-04-05

## 2023-04-05 RX ADMIN — ONDANSETRON 4 MG: 2 INJECTION INTRAMUSCULAR; INTRAVENOUS at 00:27

## 2023-04-05 RX ADMIN — MORPHINE SULFATE 4 MG: 2 INJECTION, SOLUTION INTRAMUSCULAR; INTRAVENOUS at 00:27

## 2023-04-05 RX ADMIN — IOPAMIDOL 100 ML: 612 INJECTION, SOLUTION INTRAVENOUS at 01:56

## 2023-04-05 NOTE — ED PROVIDER NOTES
MD ATTESTATION NOTE    The MONO and I have discussed this patient's history, physical exam, and treatment plan.  I have reviewed the documentation and personally had a face to face interaction with the patient. I affirm the documentation and agree with the treatment and plan.  The attached note describes my personal findings.      I provided a substantive portion of the care of the patient.  I personally performed the physical exam in its entirety, and below are my findings.  For this patient encounter, the patient wore surgical mask, I wore full protective PPE including N95 and eye protection.      Brief HPI: Patient presents for evaluation of abdominal pain cough and shortness of breath.  States she been having right-sided abdominal pain for more than a week.  Has had congestion.  Has prior cholecystectomy    PHYSICAL EXAM  ED Triage Vitals   Temp Heart Rate Resp BP SpO2   04/04/23 2214 04/04/23 2214 04/04/23 2214 04/04/23 2244 04/04/23 2214   98.6 °F (37 °C) 106 18 136/98 97 %      Temp src Heart Rate Source Patient Position BP Location FiO2 (%)   -- -- -- -- --                GENERAL: no acute distress  HENT: nares patent  EYES: no scleral icterus  CV: regular rhythm, normal rate  RESPIRATORY: normal effort  ABDOMEN: soft. Right lower abdominal tenderness  MUSCULOSKELETAL: no deformity  NEURO: alert, moves all extremities, follows commands  PSYCH:  calm, cooperative  SKIN: warm, dry    Vital signs and nursing notes reviewed.        Plan: Ct abdomen       Hardeep Meyers MD  04/04/23 6868

## 2023-04-05 NOTE — ED PROVIDER NOTES
EMERGENCY DEPARTMENT ENCOUNTER    Room Number:  06/06  Date seen:  4/5/2023  PCP: Meeta Dickson APRN      HPI:  Chief Complaint: Abdominal pain, cough, shortness of breath, fever, chills    A complete HPI/ROS/PMH/PSH/SH/FH are unobtainable due to: Nothing    Context: Alejo Pascal is a 32 y.o. female who presents to the ED c/o abdominal pain, cough, shortness of breath, fever, chills.  Patient states the cough, shortness of breath, sore throat, congestion began a few days ago.  Is gradually worsened since onset.  Patient informs me she has a PMH of COPD and has a long history of smoking.  She states she also has a past medical history of pneumonia.  She is unaware of any recent sick contacts.    In addition to the upper respiratory symptoms patient is complaining of a right lower quadrant abdominal pain that seems to somewhat radiate to the back.  She denies dysuria, gross hematuria, pregnancy, abnormal vaginal bleeding or discharge associated with her symptoms.  She states she no longer has a gallbladder but does have her appendix.  Pain is said to be moderate on the pain scale.  Nothing makes it better or worse.  She is not on anticoagulant antiplatelet therapy.  She denies pregnancy and states she has a PMH of BTL.  She is here for further evaluation.        PAST MEDICAL HISTORY  Active Ambulatory Problems     Diagnosis Date Noted   • No Active Ambulatory Problems     Resolved Ambulatory Problems     Diagnosis Date Noted   • No Resolved Ambulatory Problems     Past Medical History:   Diagnosis Date   • Anxiety    • Panic attack          PAST SURGICAL HISTORY  Past Surgical History:   Procedure Laterality Date   • CHOLECYSTECTOMY     • TUBAL ABDOMINAL LIGATION           FAMILY HISTORY  No family history on file.      SOCIAL HISTORY  Social History     Socioeconomic History   • Marital status: Single   Tobacco Use   • Smoking status: Every Day     Packs/day: 1.50     Years: 15.00     Pack years: 22.50      Types: Cigarettes   • Smokeless tobacco: Never   Vaping Use   • Vaping Use: Never used   Substance and Sexual Activity   • Alcohol use: Yes     Comment: 4 mikes hard drinks on days not working, pt works 4 days a week   • Drug use: Not Currently     Frequency: 1.0 times per week     Comment: previously used Ecstacy once a week, until 1 year ago   • Sexual activity: Defer         ALLERGIES  Patient has no known allergies.        REVIEW OF SYSTEMS  Review of Systems     All systems reviewed and negative except for those discussed in HPI.       PHYSICAL EXAM  ED Triage Vitals   Temp Heart Rate Resp BP SpO2   04/04/23 2214 04/04/23 2214 04/04/23 2214 04/04/23 2244 04/04/23 2214   98.6 °F (37 °C) 106 18 136/98 97 %      Temp src Heart Rate Source Patient Position BP Location FiO2 (%)   -- -- -- -- --              Physical Exam      GENERAL: Nourished, nontoxic, no distress  HENT: nares patent  EYES: no scleral icterus  CV: regular rhythm, normal rate  RESPIRATORY: normal effort, faint wheezing diffusely.  SPO2 98%.  No rales rhonchi or stridor noted.  ABDOMEN: TTP in the right lower quadrant/right adnexal region without guarding or rebound.  No obvious mass noted.  MUSCULOSKELETAL: no deformity  NEURO: alert, moves all extremities, follows commands  PSYCH:  calm, cooperative  SKIN: warm, dry    Vital signs and nursing notes reviewed.          LAB RESULTS  Recent Results (from the past 24 hour(s))   Comprehensive Metabolic Panel    Collection Time: 04/04/23 11:42 PM    Specimen: Blood   Result Value Ref Range    Glucose 101 (H) 65 - 99 mg/dL    BUN 16 6 - 20 mg/dL    Creatinine 0.67 0.57 - 1.00 mg/dL    Sodium 137 136 - 145 mmol/L    Potassium 4.2 3.5 - 5.2 mmol/L    Chloride 102 98 - 107 mmol/L    CO2 26.0 22.0 - 29.0 mmol/L    Calcium 9.2 8.6 - 10.5 mg/dL    Total Protein 7.6 6.0 - 8.5 g/dL    Albumin 4.0 3.5 - 5.2 g/dL    ALT (SGPT) 20 1 - 33 U/L    AST (SGOT) 15 1 - 32 U/L    Alkaline Phosphatase 102 39 - 117 U/L     Total Bilirubin <0.2 0.0 - 1.2 mg/dL    Globulin 3.6 gm/dL    A/G Ratio 1.1 g/dL    BUN/Creatinine Ratio 23.9 7.0 - 25.0    Anion Gap 9.0 5.0 - 15.0 mmol/L    eGFR 118.5 >60.0 mL/min/1.73   Lipase    Collection Time: 04/04/23 11:42 PM    Specimen: Blood   Result Value Ref Range    Lipase 36 13 - 60 U/L   hCG, Serum, Qualitative    Collection Time: 04/04/23 11:42 PM    Specimen: Blood   Result Value Ref Range    HCG Qualitative Negative Negative   CBC Auto Differential    Collection Time: 04/04/23 11:42 PM    Specimen: Blood   Result Value Ref Range    WBC 8.80 3.40 - 10.80 10*3/mm3    RBC 4.75 3.77 - 5.28 10*6/mm3    Hemoglobin 14.1 12.0 - 15.9 g/dL    Hematocrit 42.0 34.0 - 46.6 %    MCV 88.4 79.0 - 97.0 fL    MCH 29.7 26.6 - 33.0 pg    MCHC 33.6 31.5 - 35.7 g/dL    RDW 13.1 12.3 - 15.4 %    RDW-SD 41.9 37.0 - 54.0 fl    MPV 9.9 6.0 - 12.0 fL    Platelets 270 140 - 450 10*3/mm3    Neutrophil % 70.4 42.7 - 76.0 %    Lymphocyte % 15.6 (L) 19.6 - 45.3 %    Monocyte % 10.9 5.0 - 12.0 %    Eosinophil % 1.7 0.3 - 6.2 %    Basophil % 0.9 0.0 - 1.5 %    Immature Grans % 0.5 0.0 - 0.5 %    Neutrophils, Absolute 6.20 1.70 - 7.00 10*3/mm3    Lymphocytes, Absolute 1.37 0.70 - 3.10 10*3/mm3    Monocytes, Absolute 0.96 (H) 0.10 - 0.90 10*3/mm3    Eosinophils, Absolute 0.15 0.00 - 0.40 10*3/mm3    Basophils, Absolute 0.08 0.00 - 0.20 10*3/mm3    Immature Grans, Absolute 0.04 0.00 - 0.05 10*3/mm3    nRBC 0.0 0.0 - 0.2 /100 WBC   COVID-19, FLU A/B, RSV PCR - Swab, Nasopharynx    Collection Time: 04/04/23 11:44 PM    Specimen: Nasopharynx; Swab   Result Value Ref Range    COVID19 Not Detected Not Detected - Ref. Range    Influenza A PCR Not Detected Not Detected    Influenza B PCR Not Detected Not Detected    RSV, PCR Not Detected Not Detected   Urinalysis With Microscopic If Indicated (No Culture) - Urine, Clean Catch    Collection Time: 04/05/23 12:29 AM    Specimen: Urine, Clean Catch   Result Value Ref Range    Color, UA Yellow  Yellow, Straw    Appearance, UA Cloudy (A) Clear    pH, UA 6.0 5.0 - 8.0    Specific Gravity, UA 1.026 1.005 - 1.030    Glucose, UA Negative Negative    Ketones, UA Negative Negative    Bilirubin, UA Negative Negative    Blood, UA Negative Negative    Protein, UA Negative Negative    Leuk Esterase, UA Trace (A) Negative    Nitrite, UA Negative Negative    Urobilinogen, UA 0.2 E.U./dL 0.2 - 1.0 E.U./dL   Urinalysis, Microscopic Only - Urine, Clean Catch    Collection Time: 04/05/23 12:29 AM    Specimen: Urine, Clean Catch   Result Value Ref Range    RBC, UA 0-2 None Seen, 0-2 /HPF    WBC, UA 3-5 (A) None Seen, 0-2 /HPF    Bacteria, UA 2+ (A) None Seen /HPF    Squamous Epithelial Cells, UA 13-20 (A) None Seen, 0-2 /HPF    Hyaline Casts, UA 3-6 None Seen /LPF    Methodology Automated Microscopy        Ordered the above labs and reviewed the results.        RADIOLOGY  CT Abdomen Pelvis With Contrast    Result Date: 4/5/2023  CT OF THE ABDOMEN AND PELVIS WITH CONTRAST  HISTORY: Right lower quadrant pain  COMPARISON: None available.  TECHNIQUE: Axial CT imaging was obtained through the abdomen and pelvis. IV contrast was administered.  FINDINGS: Images through lung bases demonstrate reticulonodular infiltrates within the right lower lobe. Correlation with any symptoms of pneumonia is recommended. The stomach, duodenum, adrenal glands, spleen, and pancreas are all normal. No suspicious hepatic lesions are seen. The gallbladder is absent. There is some dilatation of the common bile duct, measuring up to 1 cm. However, this may be postcholecystectomy in nature. Correlation with liver function tests is recommended. The kidneys enhance symmetrically. There is no hydronephrosis. No distal ureteral or bladder stones are seen. Uterus and urinary bladder appear normal. No adnexal masses are seen. There is no bowel obstruction. The appendix is normal. No acute osseous abnormalities are seen.       1. Reticulonodular infiltrates  identified at the right lung base. Correlation with any symptoms of pneumonia is recommended. 2. Prominence of the common bile duct may be postcholecystectomy in nature, but correlation with liver function tests is recommended.  Radiation dose reduction techniques were utilized, including automated exposure control and exposure modulation based on body size.  This report was finalized on 4/5/2023 2:22 AM by Dr. Mirella Gtz M.D.      XR Chest 1 View    Result Date: 4/5/2023  SINGLE VIEW OF THE CHEST  HISTORY: Fever of unknown origin  COMPARISON: 10/07/2022  FINDINGS: Heart size is within normal limits. No pneumothorax, pleural effusion, or acute infiltrate is seen.      No acute findings.  This report was finalized on 4/5/2023 12:09 AM by Dr. Mirella Gtz M.D.        Ordered the above noted radiological studies. Reviewed by me in PACS.          PROCEDURES  Procedures          MEDICATIONS GIVEN IN ER  Medications   sodium chloride 0.9 % flush 10 mL (has no administration in time range)   albuterol (PROVENTIL) nebulizer solution 0.083% 2.5 mg/3mL (2.5 mg Nebulization Given 4/4/23 2355)   ketorolac (TORADOL) injection 15 mg (15 mg Intravenous Given 4/4/23 2345)   sodium chloride 0.9 % bolus 1,000 mL (0 mL Intravenous Stopped 4/5/23 0241)   morphine injection 4 mg (4 mg Intravenous Given 4/5/23 0027)   ondansetron (ZOFRAN) injection 4 mg (4 mg Intravenous Given 4/5/23 0027)   iopamidol (ISOVUE-300) 61 % injection 100 mL (100 mL Intravenous Given 4/5/23 0156)         MEDICAL DECISION MAKING, PROGRESS, and CONSULTS    Discussion below represents my analysis of pertinent findings related to patient's condition, differential diagnosis, treatment plan and final disposition.      Orders placed during this visit:  Orders Placed This Encounter   Procedures   • COVID-19, FLU A/B, RSV PCR - Swab, Nasopharynx   • XR Chest 1 View   • CT Abdomen Pelvis With Contrast   • Comprehensive Metabolic Panel   • Lipase   • hCG,  Serum, Qualitative   • CBC Auto Differential   • Urinalysis With Microscopic If Indicated (No Culture) - Urine, Clean Catch   • Urinalysis, Microscopic Only - Urine, Clean Catch   • Cardiac Monitoring   • Pulse Oximetry, Continuous   • Insert Peripheral IV   • CBC & Differential         Additional sources:  - Discussed/obtained information from independent historians: None available  Additional information was obtained to confirm the patient's history.    - External (non-ED) record review: Patient was seen by a FRIDA Rodriguez with [facility] in a postop follow-up visit on 1/27/2023.  Patient has had a right middle trigger finger did previously been repaired.  Surgery appeared to be uncomplicated and the patient was doing well.  She was discharged and to follow-up on Tuesday, 3/8/2023 for repeat evaluation.            - Chronic or social conditions impacting care: None        Differential diagnosis:    For the cough and URI-like symptoms: COVID-19, community-acquired pneumonia, mycoplasma pneumonia, asthmatic bronchitis, other viral illness.    For the abdominal pain: Acute appendicitis, tubo-ovarian abscess, PID, ovarian cyst/ovarian torsion, ectopic pregnancy, colitis, viral GE, other intra-abdominal/pelvic process.            Independent interpretation of labs, radiology studies, and discussions with consultants:  ED Course as of 04/05/23 0443   Tue Apr 04, 2023 2328 BP: 136/98 [RC]   2328 Temp: 98.6 °F (37 °C) [RC]   2329 Heart Rate: 100 [RC]   2329 Resp: 18 [RC]   2329 SpO2: 98 %  RA [RC]   Wed Apr 05, 2023   0019 Reassessed patient who states she still in significant pain after the IV Toradol.  Will order 4 mg of morphine and 4 mg of Zofran.  Thus far work-up is unremarkable.  CT is still pending. [RC]   0043 WBC: 8.80 [RC]   0043 RBC: 4.75 [RC]   0043 Hemoglobin: 14.1 [RC]   0043 Hematocrit: 42.0 [RC]   0043 Platelets: 270 [RC]   0043 Glucose(!): 101 [RC]   0043 BUN: 16 [RC]   0043 Creatinine: 0.67 [RC]    0043 Sodium: 137 [RC]   0043 Potassium: 4.2 [RC]   0043 CO2: 26.0 [RC]   0043 Anion Gap: 9.0 [RC]   0043 ALT (SGPT): 20 [RC]   0043 AST (SGOT): 15 [RC]   0043 Alkaline Phosphatase: 102 [RC]   0043 Total Bilirubin: <0.2 [RC]   0043 Lipase: 36 [RC]   0043 HCG Qualitative: Negative [RC]   0207 I viewed and independently interpreted the patient's chest x-ray is no acute process.  This is the radiologist official findings as well. [RC]   0228 The official read of the CT abdomen pelvis was read as follows:IMPRESSION:     1. Reticulonodular infiltrates identified at the right lung base.  Correlation with any symptoms of pneumonia is recommended.  2. Prominence of the common bile duct may be postcholecystectomy in  nature, but correlation with liver function tests is recommended.     Radiation dose reduction techniques were utilized, including automated  exposure control and exposure modulation based on body size.     This report was finalized on 4/5/2023 2:22 AM by Dr. Mirella Gtz M.D. [RC]   0228 Leukocytes, UA(!): Trace [RC]   0228 Bacteria, UA(!): 2+ [RC]   0228 Squamous Epithelial Cells, UA(!): 13-20  Suspect urine is contaminated.  Patient is having UTI-like symptoms.  There are also infiltrates seen in the lower chest on CT abdomen pelvis.  Although this may be viral, patient is a smoker, given the full clinical picture, we will go ahead and cover with antibiotics and close follow-up. [RC]      ED Course User Index  [RC] Poli Hayden III, PA               PPE: The patient wore a mask throughout the entire encounter. I wore a well-fitting mask.    DIAGNOSIS  Final diagnoses:   Pneumonia due to infectious organism, unspecified laterality, unspecified part of lung   Lower abdominal pain         DISPOSITION    DISCHARGE    Patient discharged in stable condition.    Reviewed implications of results, diagnosis, meds, responsibility to follow up, warning signs and symptoms of possible worsening,  potential complications and reasons to return to ER.    Patient/Family voiced understanding of above instructions.    Discussed plan for discharge, as there is no emergent indication for admission. Patient referred to primary care provider for BP management due to today's BP. Pt/family is agreeable and understands need for follow up and repeat testing.  Pt is aware that discharge does not mean that nothing is wrong but it indicates no emergency is present that requires admission and they must continue care with follow-up as given below or physician of their choice.     FOLLOW-UP  Meeta Dickson, APRN  [address] [facility]  [address]  [place] [id number] [phone]    Schedule an appointment as soon as possible for a visit   For further evaluation and treatment         Medication List      New Prescriptions    diclofenac 50 MG EC tablet  Commonly known as: VOLTAREN  Take 1 tablet by mouth 3 (Three) Times a Day.     doxycycline 100 MG capsule  Commonly known as: MONODOX  Take 1 capsule by mouth 2 (Two) Times a Day.     Fluticasone-Salmeterol 500-50 MCG/ACT DISKUS  Commonly known as: ADVAIR/WIXELA  Inhale 1 puff 2 (Two) Times a Day.        Changed    albuterol sulfate  (90 Base) MCG/ACT inhaler  Commonly known as: PROVENTIL HFA;VENTOLIN HFA;PROAIR HFA  Inhale 2 puffs Every 6 (Six) Hours As Needed for Wheezing.  What changed:   · reasons to take this  · Another medication with the same name was removed. Continue taking this medication, and follow the directions you see here.           Where to Get Your Medications      These medications were sent to [id number] - [place], [place] - [address] AT [facility] - [phone] [facility] [phone]   [address] [facility] [address]    Phone: [phone]   · albuterol sulfate  (90 Base) MCG/ACT inhaler  · diclofenac 50 MG EC tablet  · doxycycline 100 MG capsule  · Fluticasone-Salmeterol 500-50 MCG/ACT DISKUS       Latest  Documented Vital Signs:  As of 04:43 EDT  BP- 136/72 HR- 90 Temp- 98.6 °F (37 °C) O2 sat- 98%      --    Please note that portions of this were completed with a voice recognition program.       Note Disclaimer: At [facility], we believe that sharing information builds trust and better relationships. You are receiving this note because you are receiving care at [facility] or recently visited. It is possible you will see health information before a provider has talked with you about it. This kind of information can be easy to misunderstand. To help you fully understand what it means for your health, we urge you to discuss this note with your provider.       Poli Hayden III, PA  04/05/23 0448

## 2023-04-05 NOTE — ED NOTES
Pt reports chills, sore throat and diarrhea that began suddenly this morning. She reports SOA at rest with the onset of these symptoms. Pt has been around family who has been sick recently with GI issues.

## 2023-06-01 ENCOUNTER — APPOINTMENT (OUTPATIENT)
Dept: GENERAL RADIOLOGY | Facility: HOSPITAL | Age: 33
End: 2023-06-01
Payer: COMMERCIAL

## 2023-06-01 ENCOUNTER — HOSPITAL ENCOUNTER (EMERGENCY)
Facility: HOSPITAL | Age: 33
Discharge: HOME | End: 2023-06-01
Attending: EMERGENCY MEDICINE
Payer: COMMERCIAL

## 2023-06-01 VITALS
SYSTOLIC BLOOD PRESSURE: 128 MMHG | HEIGHT: 66 IN | BODY MASS INDEX: 38.57 KG/M2 | WEIGHT: 240 LBS | HEART RATE: 89 BPM | DIASTOLIC BLOOD PRESSURE: 71 MMHG | OXYGEN SATURATION: 98 % | TEMPERATURE: 98.7 F | RESPIRATION RATE: 16 BRPM

## 2023-06-01 DIAGNOSIS — J20.9 ACUTE EXACERBATION OF CHRONIC BRONCHITIS: Primary | ICD-10-CM

## 2023-06-01 DIAGNOSIS — J42 ACUTE EXACERBATION OF CHRONIC BRONCHITIS: Primary | ICD-10-CM

## 2023-06-01 LAB
ALBUMIN SERPL-MCNC: 3.6 G/DL (ref 3.5–5.2)
ALBUMIN/GLOB SERPL: 1.2 G/DL
ALP SERPL-CCNC: 99 U/L (ref 39–117)
ALT SERPL W P-5'-P-CCNC: 20 U/L (ref 1–33)
ANION GAP SERPL CALCULATED.3IONS-SCNC: 12.9 MMOL/L (ref 5–15)
AST SERPL-CCNC: 17 U/L (ref 1–32)
B PARAPERT DNA SPEC QL NAA+PROBE: NOT DETECTED
B PERT DNA SPEC QL NAA+PROBE: NOT DETECTED
BASOPHILS # BLD AUTO: 0.05 10*3/MM3 (ref 0–0.2)
BASOPHILS NFR BLD AUTO: 0.7 % (ref 0–1.5)
BILIRUB SERPL-MCNC: <0.2 MG/DL (ref 0–1.2)
BUN SERPL-MCNC: 20 MG/DL (ref 6–20)
BUN/CREAT SERPL: 29 (ref 7–25)
C PNEUM DNA NPH QL NAA+NON-PROBE: NOT DETECTED
CALCIUM SPEC-SCNC: 9.4 MG/DL (ref 8.6–10.5)
CHLORIDE SERPL-SCNC: 101 MMOL/L (ref 98–107)
CO2 SERPL-SCNC: 24.1 MMOL/L (ref 22–29)
CREAT SERPL-MCNC: 0.69 MG/DL (ref 0.57–1)
DEPRECATED RDW RBC AUTO: 41.3 FL (ref 37–54)
EGFRCR SERPLBLD CKD-EPI 2021: 117.7 ML/MIN/1.73
EOSINOPHIL # BLD AUTO: 0.13 10*3/MM3 (ref 0–0.4)
EOSINOPHIL NFR BLD AUTO: 1.9 % (ref 0.3–6.2)
ERYTHROCYTE [DISTWIDTH] IN BLOOD BY AUTOMATED COUNT: 12.8 % (ref 12.3–15.4)
FLUAV SUBTYP SPEC NAA+PROBE: NOT DETECTED
FLUBV RNA ISLT QL NAA+PROBE: NOT DETECTED
GLOBULIN UR ELPH-MCNC: 3 GM/DL
GLUCOSE SERPL-MCNC: 153 MG/DL (ref 65–99)
HADV DNA SPEC NAA+PROBE: NOT DETECTED
HCOV 229E RNA SPEC QL NAA+PROBE: NOT DETECTED
HCOV HKU1 RNA SPEC QL NAA+PROBE: NOT DETECTED
HCOV NL63 RNA SPEC QL NAA+PROBE: NOT DETECTED
HCOV OC43 RNA SPEC QL NAA+PROBE: NOT DETECTED
HCT VFR BLD AUTO: 39.1 % (ref 34–46.6)
HGB BLD-MCNC: 13.5 G/DL (ref 12–15.9)
HMPV RNA NPH QL NAA+NON-PROBE: NOT DETECTED
HOLD SPECIMEN: NORMAL
HOLD SPECIMEN: NORMAL
HPIV1 RNA ISLT QL NAA+PROBE: NOT DETECTED
HPIV2 RNA SPEC QL NAA+PROBE: NOT DETECTED
HPIV3 RNA NPH QL NAA+PROBE: NOT DETECTED
HPIV4 P GENE NPH QL NAA+PROBE: NOT DETECTED
IMM GRANULOCYTES # BLD AUTO: 0.05 10*3/MM3 (ref 0–0.05)
IMM GRANULOCYTES NFR BLD AUTO: 0.7 % (ref 0–0.5)
LYMPHOCYTES # BLD AUTO: 1.06 10*3/MM3 (ref 0.7–3.1)
LYMPHOCYTES NFR BLD AUTO: 15.5 % (ref 19.6–45.3)
M PNEUMO IGG SER IA-ACNC: NOT DETECTED
MCH RBC QN AUTO: 30.3 PG (ref 26.6–33)
MCHC RBC AUTO-ENTMCNC: 34.5 G/DL (ref 31.5–35.7)
MCV RBC AUTO: 87.9 FL (ref 79–97)
MONOCYTES # BLD AUTO: 0.74 10*3/MM3 (ref 0.1–0.9)
MONOCYTES NFR BLD AUTO: 10.8 % (ref 5–12)
NEUTROPHILS NFR BLD AUTO: 4.83 10*3/MM3 (ref 1.7–7)
NEUTROPHILS NFR BLD AUTO: 70.4 % (ref 42.7–76)
NRBC BLD AUTO-RTO: 0 /100 WBC (ref 0–0.2)
NT-PROBNP SERPL-MCNC: 71.7 PG/ML (ref 0–450)
PLATELET # BLD AUTO: 234 10*3/MM3 (ref 140–450)
PMV BLD AUTO: 9.8 FL (ref 6–12)
POTASSIUM SERPL-SCNC: 3.2 MMOL/L (ref 3.5–5.2)
PROT SERPL-MCNC: 6.6 G/DL (ref 6–8.5)
QT INTERVAL: 357 MS
RBC # BLD AUTO: 4.45 10*6/MM3 (ref 3.77–5.28)
RHINOVIRUS RNA SPEC NAA+PROBE: NOT DETECTED
RSV RNA NPH QL NAA+NON-PROBE: NOT DETECTED
SARS-COV-2 RNA NPH QL NAA+NON-PROBE: NOT DETECTED
SODIUM SERPL-SCNC: 138 MMOL/L (ref 136–145)
TROPONIN T SERPL HS-MCNC: <6 NG/L
WBC NRBC COR # BLD: 6.86 10*3/MM3 (ref 3.4–10.8)
WHOLE BLOOD HOLD COAG: NORMAL
WHOLE BLOOD HOLD SPECIMEN: NORMAL

## 2023-06-01 PROCEDURE — 0202U NFCT DS 22 TRGT SARS-COV-2: CPT | Performed by: EMERGENCY MEDICINE

## 2023-06-01 PROCEDURE — 99284 EMERGENCY DEPT VISIT MOD MDM: CPT

## 2023-06-01 PROCEDURE — 93005 ELECTROCARDIOGRAM TRACING: CPT | Performed by: EMERGENCY MEDICINE

## 2023-06-01 PROCEDURE — 80053 COMPREHEN METABOLIC PANEL: CPT | Performed by: EMERGENCY MEDICINE

## 2023-06-01 PROCEDURE — 83880 ASSAY OF NATRIURETIC PEPTIDE: CPT | Performed by: EMERGENCY MEDICINE

## 2023-06-01 PROCEDURE — 84484 ASSAY OF TROPONIN QUANT: CPT | Performed by: EMERGENCY MEDICINE

## 2023-06-01 PROCEDURE — 85025 COMPLETE CBC W/AUTO DIFF WBC: CPT | Performed by: EMERGENCY MEDICINE

## 2023-06-01 PROCEDURE — 71046 X-RAY EXAM CHEST 2 VIEWS: CPT

## 2023-06-01 RX ORDER — SODIUM CHLORIDE 0.9 % (FLUSH) 0.9 %
10 SYRINGE (ML) INJECTION AS NEEDED
Status: DISCONTINUED | OUTPATIENT
Start: 2023-06-01 | End: 2023-06-01 | Stop reason: HOSPADM

## 2023-06-01 RX ORDER — METHYLPREDNISOLONE 4 MG/1
TABLET ORAL
Qty: 1 EACH | Refills: 0 | Status: SHIPPED | OUTPATIENT
Start: 2023-06-01

## 2023-06-01 NOTE — ED PROVIDER NOTES
EMERGENCY DEPARTMENT ENCOUNTER    Room Number:  15/15  Date seen:  6/1/2023  PCP: Meeta Dickson APRN  Historian: Patient      HPI:  Chief Complaint: Cough, congestion, chest tightness    A complete HPI/ROS/PMH/PSH/SH/FH are unobtainable due to: N/A    Context: Alejo Pascal is a 32 y.o. female who presents to the ED c/o cough, congestion and chest tightness which started the morning of 5/31/2023.  Cough is productive of phlegm.  She has had no fevers or chills.  Chest feels tight.  No nausea, vomiting or diarrhea.    She is a smoker and states that she gets pneumonia frequently.    Additional sources:  - Discussed/ obtained information from independent historians: No-she is unaccompanied today    - External (non-ED) record review: Patient has a history of anxiety.  She was last seen in this emergency department in early April for pneumonia.  No recent hospitalizations at [facility].    - Chronic or social conditions impacting care: She is a smoker.      PAST MEDICAL HISTORY  Active Ambulatory Problems     Diagnosis Date Noted   • No Active Ambulatory Problems     Resolved Ambulatory Problems     Diagnosis Date Noted   • No Resolved Ambulatory Problems     Past Medical History:   Diagnosis Date   • Anxiety    • Panic attack          PAST SURGICAL HISTORY  Past Surgical History:   Procedure Laterality Date   • CHOLECYSTECTOMY     • TUBAL ABDOMINAL LIGATION           FAMILY HISTORY  No family history on file.      SOCIAL HISTORY  Social History     Socioeconomic History   • Marital status: Single   Tobacco Use   • Smoking status: Every Day     Packs/day: 1.50     Years: 15.00     Pack years: 22.50     Types: Cigarettes   • Smokeless tobacco: Never   Vaping Use   • Vaping Use: Never used   Substance and Sexual Activity   • Alcohol use: Yes     Comment: 4 mikes hard drinks on days not working, pt works 4 days a week   • Drug use: Not Currently     Frequency: 1.0 times per week     Comment: previously  used Ecstacy once a week, until 1 year ago   • Sexual activity: Defer         ALLERGIES  Patient has no known allergies.        REVIEW OF SYSTEMS  Review of Systems   Constitutional: Negative for chills and fever.   Respiratory: Positive for cough, chest tightness and shortness of breath.    Cardiovascular: Negative for leg swelling.   Gastrointestinal: Negative for abdominal pain, diarrhea, nausea and vomiting.            PHYSICAL EXAM  ED Triage Vitals   Temp Heart Rate Resp BP SpO2   06/01/23 0147 06/01/23 0147 06/01/23 0147 06/01/23 0148 06/01/23 0147   98.7 °F (37.1 °C) 106 16 137/84 97 %      Temp src Heart Rate Source Patient Position BP Location FiO2 (%)   -- -- -- -- --              Physical Exam      Physical Exam   Constitutional: Pt. is oriented to person, place, and time.  She is well-developed, well-nourished, and in no distress.    HENT: Normocephalic and atraumatic. Pupils are equal, round, and reactive to light.   Neck: Normal range of motion. Neck supple. No JVD present. No tracheal deviation present.   Cardiovascular: Normal rate, regular rhythm and normal heart sounds.   Pulmonary/Chest: Effort normal and breath sounds normal. No stridor. No respiratory distress. No wheezes, no rales.   Abdominal: Soft.  No distension. There is no tenderness. There is no rebound and no guarding.   Musculoskeletal: No edema, tenderness or deformity.   Neurological: She is alert and oriented to person, place, and time.  She has no focal neurologic deficits.  Skin: Skin is warm and dry. Pt. is not diaphoretic.  Psychiatric: Mood, affect normal.  She is pleasant and cooperative.  Nursing note and vitals reviewed.            LAB RESULTS  Recent Results (from the past 24 hour(s))   ECG 12 Lead ED Triage Standing Order; SOA    Collection Time: 06/01/23  1:58 AM   Result Value Ref Range    QT Interval 357 ms   Comprehensive Metabolic Panel    Collection Time: 06/01/23  2:27 AM    Specimen: Blood   Result Value Ref Range     Glucose 153 (H) 65 - 99 mg/dL    BUN 20 6 - 20 mg/dL    Creatinine 0.69 0.57 - 1.00 mg/dL    Sodium 138 136 - 145 mmol/L    Potassium 3.2 (L) 3.5 - 5.2 mmol/L    Chloride 101 98 - 107 mmol/L    CO2 24.1 22.0 - 29.0 mmol/L    Calcium 9.4 8.6 - 10.5 mg/dL    Total Protein 6.6 6.0 - 8.5 g/dL    Albumin 3.6 3.5 - 5.2 g/dL    ALT (SGPT) 20 1 - 33 U/L    AST (SGOT) 17 1 - 32 U/L    Alkaline Phosphatase 99 39 - 117 U/L    Total Bilirubin <0.2 0.0 - 1.2 mg/dL    Globulin 3.0 gm/dL    A/G Ratio 1.2 g/dL    BUN/Creatinine Ratio 29.0 (H) 7.0 - 25.0    Anion Gap 12.9 5.0 - 15.0 mmol/L    eGFR 117.7 >60.0 mL/min/1.73   BNP    Collection Time: 06/01/23  2:27 AM    Specimen: Blood   Result Value Ref Range    proBNP 71.7 0.0 - 450.0 pg/mL   Single High Sensitivity Troponin T    Collection Time: 06/01/23  2:27 AM    Specimen: Blood   Result Value Ref Range    HS Troponin T <6 <10 ng/L   Green Top (Gel)    Collection Time: 06/01/23  2:27 AM   Result Value Ref Range    Extra Tube Hold for add-ons.    Lavender Top    Collection Time: 06/01/23  2:27 AM   Result Value Ref Range    Extra Tube hold for add-on    Gold Top - SST    Collection Time: 06/01/23  2:27 AM   Result Value Ref Range    Extra Tube Hold for add-ons.    Light Blue Top    Collection Time: 06/01/23  2:27 AM   Result Value Ref Range    Extra Tube Hold for add-ons.    CBC Auto Differential    Collection Time: 06/01/23  2:27 AM    Specimen: Blood   Result Value Ref Range    WBC 6.86 3.40 - 10.80 10*3/mm3    RBC 4.45 3.77 - 5.28 10*6/mm3    Hemoglobin 13.5 12.0 - 15.9 g/dL    Hematocrit 39.1 34.0 - 46.6 %    MCV 87.9 79.0 - 97.0 fL    MCH 30.3 26.6 - 33.0 pg    MCHC 34.5 31.5 - 35.7 g/dL    RDW 12.8 12.3 - 15.4 %    RDW-SD 41.3 37.0 - 54.0 fl    MPV 9.8 6.0 - 12.0 fL    Platelets 234 140 - 450 10*3/mm3    Neutrophil % 70.4 42.7 - 76.0 %    Lymphocyte % 15.5 (L) 19.6 - 45.3 %    Monocyte % 10.8 5.0 - 12.0 %    Eosinophil % 1.9 0.3 - 6.2 %    Basophil % 0.7 0.0 - 1.5 %     Immature Grans % 0.7 (H) 0.0 - 0.5 %    Neutrophils, Absolute 4.83 1.70 - 7.00 10*3/mm3    Lymphocytes, Absolute 1.06 0.70 - 3.10 10*3/mm3    Monocytes, Absolute 0.74 0.10 - 0.90 10*3/mm3    Eosinophils, Absolute 0.13 0.00 - 0.40 10*3/mm3    Basophils, Absolute 0.05 0.00 - 0.20 10*3/mm3    Immature Grans, Absolute 0.05 0.00 - 0.05 10*3/mm3    nRBC 0.0 0.0 - 0.2 /100 WBC   Respiratory Panel PCR w/COVID-19(SARS-CoV-2) JOVITA/HUONG/SINA/PAD/COR/MAD/HANNA In-House, NP Swab in UTM/VTM, 3-4 HR TAT - Swab, Nasopharynx    Collection Time: 06/01/23  2:28 AM    Specimen: Nasopharynx; Swab   Result Value Ref Range    ADENOVIRUS, PCR Not Detected Not Detected    Coronavirus 229E Not Detected Not Detected    Coronavirus HKU1 Not Detected Not Detected    Coronavirus NL63 Not Detected Not Detected    Coronavirus OC43 Not Detected Not Detected    COVID19 Not Detected Not Detected - Ref. Range    Human Metapneumovirus Not Detected Not Detected    Human Rhinovirus/Enterovirus Not Detected Not Detected    Influenza A PCR Not Detected Not Detected    Influenza B PCR Not Detected Not Detected    Parainfluenza Virus 1 Not Detected Not Detected    Parainfluenza Virus 2 Not Detected Not Detected    Parainfluenza Virus 3 Not Detected Not Detected    Parainfluenza Virus 4 Not Detected Not Detected    RSV, PCR Not Detected Not Detected    Bordetella pertussis pcr Not Detected Not Detected    Bordetella parapertussis PCR Not Detected Not Detected    Chlamydophila pneumoniae PCR Not Detected Not Detected    Mycoplasma pneumo by PCR Not Detected Not Detected       Ordered the above labs and reviewed the results.        RADIOLOGY  XR Chest 2 View    Result Date: 6/1/2023  PA AND LATERAL CHEST RADIOGRAPH  HISTORY: Shortness of air  COMPARISON: 04/04/2023  FINDINGS: Heart size is within normal limits. No pneumothorax, pleural effusion, or acute infiltrate is seen.      No acute findings.  This report was finalized on 6/1/2023 2:20 AM by Dr. Vu  KOURTNEY Gtz        Ordered the above noted radiological studies. Reviewed by me in PACS.        PROCEDURES  Procedures      MEDICATIONS GIVEN IN ER  Medications   sodium chloride 0.9 % flush 10 mL (has no administration in time range)             MEDICAL DECISION MAKING, PROGRESS, and CONSULTS    All labs have been independently reviewed by me.  All radiology studies have been reviewed by me and discussed with radiologist dictating the report.   EKG's independently viewed and interpreted by me.  Discussion below represents my analysis of pertinent findings related to patient's condition, differential diagnosis, treatment plan and final disposition.      Orders placed during this visit:  Orders Placed This Encounter   Procedures   • Respiratory Panel PCR w/COVID-19(SARS-CoV-2) JOVITA/HUONG/SINA/PAD/COR/MAD/HANNA In-House, NP Swab in UTM/VTM, 3-4 HR TAT - Swab, Nasopharynx   • XR Chest 2 View   • [place] Draw   • Comprehensive Metabolic Panel   • BNP   • Single High Sensitivity Troponin T   • CBC Auto Differential   • NPO Diet NPO Type: Strict NPO   • Undress & Gown   • Continuous Pulse Oximetry   • Vital Signs   • Oxygen Therapy- Nasal Cannula; Titrate 1-6 LPM Per SpO2; 90 - 95%   • ECG 12 Lead ED Triage Standing Order; SOA   • Insert Peripheral IV   • CBC & Differential   • Green Top (Gel)   • Lavender Top   • Gold Top - SST   • Light Blue Top       Additional orders considered but not ordered:  N/A    Differential diagnosis:  Differential diagnosis for chest pain/dyspnea includes but is not limited to:  Muscle strain, costochondritis, myositis, pleurisy, rib fracture, intercostal neuritis, herpes zoster, tumor, myocardial infarction, coronary syndrome, unstable angina, angina, aortic dissection, mitral valve prolapse, pericarditis, palpitations, pulmonary embolus, pneumonia, pneumothorax, lung cancer, GERD, esophagitis, esophageal spasm      Independent interpretation of labs, radiology studies, and discussions with  consultants:  ED Course as of 06/01/23 0331   u Jun 01, 2023   0207 EKG performed at 01 50 8 AM and interpreted by me shows normal sinus rhythm with a rate of 85 bpm.  MD interval's, QRS complexes are unremarkable.  There are nonspecific ST-T changes.  There is no significant change compared to 10/7/2022. [WC]   0224 Chest X-ray was independently visualized and preliminarily interpreted by me.  I see no effusions or infiltrate. CXR was discussed with/officially interpreted by Dr. Gtz (radiology)-no acute processes are identified.  For official interpretation, see dictated report. [WC]   0257 CBC is unremarkable. [WC]   0306 proBNP: 71.7  Normal. [WC]   0307 Glucose(!): 153 [WC]   0307 Potassium(!): 3.2 [WC]   0307 HS Troponin T: <6  Normal. [WC]   0329 Labs, EKG and imaging as above.  My clinical suspicion for a serious underlying pulmonary or cardiac etiology is low.  There is no evidence to suggest pulmonary embolism, aortic dissection or acute coronary syndrome.  No evidence of pneumonia/CHF/COPD exacerbation that would require inpatient admission.  The patient was advised to return to the emergency department should the symptoms worsen or for any new concerns.  The patient can be safely followed as an outpatient for further workup as indicated.   [WC]      ED Course User Index  [WC] Tay Sands MD              Appropriate PPE was worn by myself and the patient throughout our entire interaction.    DIAGNOSIS  Final diagnoses:   Acute exacerbation of chronic bronchitis         DISPOSITION  Discharge            Latest Documented Vital Signs:  As of 03:31 EDT  BP- 128/71 HR- 89 Temp- 98.7 °F (37.1 °C) O2 sat- 98%        --    Please note that portions of this were completed with a voice recognition program.       Note Disclaimer: At [facility], we believe that sharing information builds trust and better relationships. You are receiving this note because you are receiving care at [facility] or  recently visited. It is possible you will see health information before a provider has talked with you about it. This kind of information can be easy to misunderstand. To help you fully understand what it means for your health, we urge you to discuss this note with your provider.           Tay Sands MD  06/01/23 0333

## 2024-01-24 ENCOUNTER — HOSPITAL ENCOUNTER (EMERGENCY)
Facility: HOSPITAL | Age: 34
Discharge: HOME | End: 2024-01-24
Attending: STUDENT IN AN ORGANIZED HEALTH CARE EDUCATION/TRAINING PROGRAM | Admitting: STUDENT IN AN ORGANIZED HEALTH CARE EDUCATION/TRAINING PROGRAM
Payer: COMMERCIAL

## 2024-01-24 VITALS
TEMPERATURE: 98.4 F | DIASTOLIC BLOOD PRESSURE: 86 MMHG | WEIGHT: 240 LBS | BODY MASS INDEX: 38.57 KG/M2 | HEIGHT: 66 IN | HEART RATE: 78 BPM | RESPIRATION RATE: 18 BRPM | OXYGEN SATURATION: 100 % | SYSTOLIC BLOOD PRESSURE: 117 MMHG

## 2024-01-24 DIAGNOSIS — M54.41 ACUTE RIGHT-SIDED LOW BACK PAIN WITH RIGHT-SIDED SCIATICA: Primary | ICD-10-CM

## 2024-01-24 LAB
BILIRUB UR QL STRIP: NEGATIVE
CLARITY UR: CLEAR
COLOR UR: YELLOW
GLUCOSE UR STRIP-MCNC: NEGATIVE MG/DL
HGB UR QL STRIP.AUTO: NEGATIVE
KETONES UR QL STRIP: ABNORMAL
LEUKOCYTE ESTERASE UR QL STRIP.AUTO: NEGATIVE
NITRITE UR QL STRIP: NEGATIVE
PH UR STRIP.AUTO: 5.5 [PH] (ref 5–8)
PROT UR QL STRIP: NEGATIVE
SP GR UR STRIP: 1.03 (ref 1–1.03)
UROBILINOGEN UR QL STRIP: ABNORMAL

## 2024-01-24 PROCEDURE — 99283 EMERGENCY DEPT VISIT LOW MDM: CPT

## 2024-01-24 PROCEDURE — 81003 URINALYSIS AUTO W/O SCOPE: CPT | Performed by: PHYSICIAN ASSISTANT

## 2024-01-24 PROCEDURE — 96372 THER/PROPH/DIAG INJ SC/IM: CPT

## 2024-01-24 PROCEDURE — 25010000002 KETOROLAC TROMETHAMINE PER 15 MG: Performed by: PHYSICIAN ASSISTANT

## 2024-01-24 RX ORDER — METHOCARBAMOL 750 MG/1
750 TABLET, FILM COATED ORAL ONCE
Status: COMPLETED | OUTPATIENT
Start: 2024-01-24 | End: 2024-01-24

## 2024-01-24 RX ORDER — KETOROLAC TROMETHAMINE 30 MG/ML
60 INJECTION, SOLUTION INTRAMUSCULAR; INTRAVENOUS ONCE
Status: COMPLETED | OUTPATIENT
Start: 2024-01-24 | End: 2024-01-24

## 2024-01-24 RX ORDER — QUETIAPINE FUMARATE 50 MG/1
50 TABLET, FILM COATED ORAL NIGHTLY
COMMUNITY

## 2024-01-24 RX ADMIN — METHOCARBAMOL TABLETS 750 MG: 750 TABLET, COATED ORAL at 01:57

## 2024-01-24 RX ADMIN — KETOROLAC TROMETHAMINE 60 MG: 30 INJECTION INTRAMUSCULAR; INTRAVENOUS at 01:57

## 2024-01-24 NOTE — ED PROVIDER NOTES
EMERGENCY DEPARTMENT ENCOUNTER  Room Number:  08/08  PCP: Meeta Bolanos APRN  Independent Historians: Patient      HPI:  Chief Complaint: had concerns including Back Pain and Foot Pain.     A complete HPI/ROS/PMH/PSH/SH/FH are unobtainable due to: None    Chronic or social conditions impacting patient care (Social Determinants of Health): None      Context: Alejo Pascal is a 33 y.o. female with a medical history of anxiety and chronic back pain who presents emergency department today with low back pain that hindered her ability to work tonight.  Patient describes the pain as a sharp stabbing pain in the right side of her low back that radiates to her right leg.  She has been seen by neurosurgery and had MRI recently which showed no surgical abnormalities.  She has tried steroids, muscle relaxers, and lidocaine patches with very little relief.  She was referred to pain management but has not been able to go to those appointments.  She denies numbness, tingling, weakness of her lower extremities.  She denies saddle anesthesia or loss of bowel or bladder function.  She says the pain today seems a little bit higher than normal.  She says she has had kidney infections in the past.  She denies dysuria, hematuria, urinary frequency or urgency.  She denies fevers or chills.      Review of prior external notes (non-ED) -and- Review of prior external test results outside of this encounter:   08/03/2023: Neurosurgery office visit  ASSESSMENT   Alejo was seen today for new patient.    Diagnoses and all orders for this visit:    DDD (degenerative disc disease), lumbar  - [facility]    Lumbar radiculopathy  - Pain Clinic    PLAN  Ms. Pascal is a 34yo female on no AC/AP medication who was referred to us by the ER for chronic LBP and L buttock/lateral leg pain. The pain first began years ago without any particular accident or injury. No N/T or weakness or incontinence. She completed a several month course of PT a few mos ago  without relief. She went to the ER on 7/18 as the pain was particularly severe. She was given a MDP, robaxin and lidoderm patches without relief.     No true deficits but giveaway weakness in all muscle groups in the L leg.     I did review the MRI with her. I certainly don't appreciate anything surgical. We discussed the importance of core strengthening/mobility but also discussed a referral to pain mgmt for possible injections/RFA. She will call as needed.     08/01/2023: MRI LUMBAR SPINE WITHOUT CONTRAST   FINDINGS:     Alignment of the lumbar spine is anatomic.       The bone marrow demonstrates normal signal characteristics.      The conus terminates at the L1 level.     Mild disc degeneration at L5-S1 with slight height loss, partial   desiccation, and small central-left paracentral protrusion with   annular fissure. Intervertebral disc spacing otherwise is well   maintained. No significant facet degenerative changes. No canal or   foraminal stenosis at any level.     The included abdominal contents and paraspinous soft tissues are   unremarkable.     IMPRESSION:     1.Mild L5-S1 disc degeneration with minimal central-left paracentral   protrusion and annular fissure. Otherwise, unremarkable MRI lumbar   spine with no acute abnormality or significant stenosis at any level.     07/18/2023: CT OF THE ABDOMEN AND PELVIS WITHOUT CONTRAST   FINDINGS:     The lung bases are clear.     The liver, spleen, pancreas, adrenal glands are unremarkable. The   gallbladder is surgically absent.     The kidneys are unremarkable. There is no evidence for renal/ureteral   stone. There is no hydronephrosis.     The bowel loops are unremarkable. The appendix is normal. There is no   free fluid or air. There is no focal fluid collection or abscess.     Images through the pelvis demonstrate no acute abnormalities.     There are no acute osseous abnormalities.     IMPRESSION:     No acute intra-abdominal or pelvic abnormalities.          PAST MEDICAL HISTORY  Active Ambulatory Problems     Diagnosis Date Noted    No Active Ambulatory Problems     Resolved Ambulatory Problems     Diagnosis Date Noted    No Resolved Ambulatory Problems     Past Medical History:   Diagnosis Date    Anxiety     Panic attack          PAST SURGICAL HISTORY  Past Surgical History:   Procedure Laterality Date    CHOLECYSTECTOMY      TUBAL ABDOMINAL LIGATION           FAMILY HISTORY  History reviewed. No pertinent family history.      SOCIAL HISTORY  Social History     Socioeconomic History    Marital status: Single   Tobacco Use    Smoking status: Every Day     Packs/day: 1.50     Years: 15.00     Additional pack years: 0.00     Total pack years: 22.50     Types: Cigarettes    Smokeless tobacco: Never   Vaping Use    Vaping Use: Never used   Substance and Sexual Activity    Alcohol use: Yes     Comment: 4 mikes hard drinks on days not working, pt works 4 days a week    Drug use: Not Currently     Frequency: 1.0 times per week     Comment: previously used Ecstacy once a week, until 1 year ago    Sexual activity: Defer         ALLERGIES  Patient has no known allergies.      REVIEW OF SYSTEMS  Included in HPI  All systems reviewed and negative except for those discussed in HPI.      PHYSICAL EXAM    I have reviewed the triage vital signs and nursing notes.    ED Triage Vitals   Temp Heart Rate Resp BP SpO2   01/24/24 0127 01/24/24 0127 01/24/24 0127 01/24/24 0131 01/24/24 0127   98.4 °F (36.9 °C) 119 18 125/96 99 %      Temp src Heart Rate Source Patient Position BP Location FiO2 (%)   01/24/24 0127 01/24/24 0127 -- -- --   Tympanic Monitor          Physical Exam  Constitutional:       General: She is not in acute distress.     Appearance: Normal appearance. She is obese.   HENT:      Head: Normocephalic and atraumatic.      Nose: Nose normal.      Mouth/Throat:      Mouth: Mucous membranes are moist.   Eyes:      Extraocular Movements: Extraocular movements intact.       Pupils: Pupils are equal, round, and reactive to light.   Cardiovascular:      Rate and Rhythm: Normal rate and regular rhythm.      Pulses: Normal pulses.      Heart sounds: Normal heart sounds.   Pulmonary:      Effort: Pulmonary effort is normal. No respiratory distress.      Breath sounds: Normal breath sounds.   Abdominal:      General: Abdomen is flat. There is no distension.      Palpations: Abdomen is soft.      Tenderness: There is no abdominal tenderness. There is no right CVA tenderness, left CVA tenderness or guarding.   Musculoskeletal:         General: Normal range of motion.      Cervical back: Normal range of motion and neck supple.      Comments: Lumbar spine: There is no midline tenderness to palpation but there is right paraspinal tenderness and tenderness over the sciatic notch.  Negative straight leg raise.  5 out of 5 strength with plantar and dorsiflexion bilaterally.  2+ DP pulses bilaterally.  Limbs are warm and well-perfused.  Sensation to light touch intact distally.     Skin:     General: Skin is warm and dry.      Capillary Refill: Capillary refill takes less than 2 seconds.   Neurological:      General: No focal deficit present.      Mental Status: She is alert and oriented to person, place, and time.   Psychiatric:         Mood and Affect: Mood normal.         Behavior: Behavior normal.             LAB RESULTS  Recent Results (from the past 24 hour(s))   Urinalysis With Culture If Indicated - Urine, Clean Catch    Collection Time: 01/24/24  2:50 AM    Specimen: Urine, Clean Catch   Result Value Ref Range    Color, UA Yellow Yellow, Straw    Appearance, UA Clear Clear    pH, UA 5.5 5.0 - 8.0    Specific Gravity, UA 1.028 1.005 - 1.030    Glucose, UA Negative Negative    Ketones, UA 40 mg/dL (2+) (A) Negative    Bilirubin, UA Negative Negative    Blood, UA Negative Negative    Protein, UA Negative Negative    Leuk Esterase, UA Negative Negative    Nitrite, UA Negative Negative     Urobilinogen, UA 1.0 E.U./dL 0.2 - 1.0 E.U./dL           MEDICATIONS GIVEN IN ER  Medications   ketorolac (TORADOL) injection 60 mg (60 mg Intramuscular Given 1/24/24 0157)   methocarbamol (ROBAXIN) tablet 750 mg (750 mg Oral Given 1/24/24 0157)           OUTPATIENT MEDICATION MANAGEMENT:  No current Epic-ordered facility-administered medications on file.     Current Outpatient Medications Ordered in Epic   Medication Sig Dispense Refill    albuterol sulfate  (90 Base) MCG/ACT inhaler Inhale 2 puffs Every 6 (Six) Hours As Needed for Wheezing. 18 g 2    Fluticasone-Salmeterol (ADVAIR/WIXELA) 500-50 MCG/ACT DISKUS Inhale 1 puff 2 (Two) Times a Day. 1 each 2    QUEtiapine (SEROquel) 50 MG tablet Take 1 tablet by mouth Every Night.      benzonatate (TESSALON) 200 MG capsule Take 1 capsule by mouth 3 (Three) Times a Day As Needed for Cough. 20 capsule 0    busPIRone (BUSPAR) 15 MG tablet Take 1 tablet by mouth 2 (two) times a day.      clobetasol (TEMOVATE) 0.05 % cream Apply 1 application topically to the appropriate area as directed 2 (Two) Times a Day. 15 g 0    diclofenac (VOLTAREN) 50 MG EC tablet Take 1 tablet by mouth 3 (Three) Times a Day. 21 tablet 0    Fexofenadine HCl (ALLEGRA ALLERGY PO) Take 1 tablet/day by mouth Daily.      hydrOXYzine (ATARAX) 10 MG tablet Take 1 tablet by mouth 3 (Three) Times a Day As Needed for Itching.      ibuprofen (ADVIL,MOTRIN) 800 MG tablet Take 1 tablet by mouth Every 8 (Eight) Hours As Needed for Mild Pain. 21 tablet 0    loperamide (IMODIUM) 2 MG capsule Take 1 capsule by mouth 4 (Four) Times a Day As Needed for Diarrhea. 30 capsule 0    methocarbamol (ROBAXIN) 750 MG tablet Take 1 tablet by mouth 3 (Three) Times a Day As Needed for Muscle Spasms. 21 tablet 0    omeprazole (priLOSEC) 20 MG capsule Take 1 capsule by mouth Daily.      ondansetron ODT (ZOFRAN-ODT) 4 MG disintegrating tablet Place 1 tablet on the tongue 4 (Four) Times a Day As Needed for Nausea or Vomiting.  15 tablet 0    PARoxetine (PAXIL) 30 MG tablet Take 2 tablets by mouth Every Morning.      propranolol (INDERAL) 20 MG tablet Take 1 tablet by mouth 4 (Four) Times a Day.      traZODone (DESYREL) 50 MG tablet Take 2 tablets by mouth Every Night.           PROGRESS, DATA ANALYSIS, CONSULTS, AND MEDICAL DECISION MAKING  ORDERS PLACED DURING THIS VISIT:  Orders Placed This Encounter   Procedures    Urinalysis With Culture If Indicated - Urine, Clean Catch       All labs have been independently interpreted by me.  All radiology studies have been reviewed by me. All EKG's have been independently viewed and interpreted by me.  Discussion below represents my analysis of pertinent findings related to patient's condition, differential diagnosis, treatment plan and final disposition.    Differential diagnosis includes but is not limited to:   My differential diagnosis for back pain includes but is not limited to:  Musculoskeletal strain, contusion, retroperitoneal hematoma, disc protrusion, vertebral fracture, transverse process fracture, rib fracture, facet syndrome, sacroiliac joint strain, sciatica, renal injury, splenic injury, pancreatic injury, osteoarthritis, lumbar spondylosis, spinal stenosis, ankylosing spondylitis, sacroiliac joint inflammation, pancreatitis, perforated peptic ulcer, diverticulitis, endometriosis, chronic PID, epidural abscess, osteomyelitis, retroperitoneal abscess, pyelonephritis, pneumonia, subphrenic abscess, tuberculosis, neurofibroma, meningioma, multiple myeloma, lymphoma, metastatic cancer, primary cancer, AAA, aortic dissection, spinal ischemia, referred pain, ureterolithiasis      ED Course:  ED Course as of 01/24/24 0319 Wed Jan 24, 2024   0219 I discussed the case with Dr. Austin and they agree to evaluate the patient at the bedside.    [CC]   0308 Ketones, UA(!): 40 mg/dL (2+) [CC]   0309 I rechecked the patient.  I discussed the patient's labs, radiology findings (including all  incidental findings), diagnosis, and plan for discharge.  A repeat exam reveals no new worrisome changes from my initial exam findings.  The patient understands that the fact that they are being discharged does not denote that nothing is abnormal, it indicates that no clinical emergency is present and that they must follow-up as directed in order to properly maintain their health.  Follow-up instructions (specifically listed below) and return to ER precautions were given at this time.  I specifically instructed the patient to follow-up with their PCP.  The patient understands and agrees with the plan, and is ready for discharge.  All questions answered.   [CC]      ED Course User Index  [CC] Ysabel Rucker PA-C           AS OF 03:19 EST VITALS:    BP - 125/96  HR - 119  TEMP - 98.4 °F (36.9 °C) (Tympanic)  O2 SATS - 99%        MDM:  Patient is a generally healthy and well-appearing 33-year-old female presents emergency department today with right-sided low back pain with right-sided radiculopathy.  On arrival here in the emergency department, vitals reassuring, she is afebrile.  On my exam the patient has right-sided paraspinal tenderness and tenderness at the sciatic notch.  She has a negative straight leg raise and 5 out of 5 strength distally.  Considered imaging of the lumbar spine but she recently had an MRI and has had no new trauma or injuries.  Pain is similar to past low back pain.  She has no red flag symptoms concerning for cauda equina syndrome.  No further imaging indicated at this time.  She was concerned about possible UTI with the pain in her back although she does not have other symptoms of UTI she was evaluated with a urinalysis which is negative for acute infection.  I considered further evaluation with labs but the patient has no concerning systemic symptoms.  Patient is requesting a work note to be filled out as she had to do take the night off due to the pain in her back.  I encouraged her  to follow-up with pain management as she may benefit from facet injections.          DIAGNOSIS  Final diagnoses:   Acute right-sided low back pain with right-sided sciatica         DISPOSITION  ED Disposition       ED Disposition   Discharge    Condition   Good    Comment   --                    Please note that portions of this document were completed with a voice recognition program.    Note Disclaimer: At [facility], we believe that sharing information builds trust and better relationships. You are receiving this note because you recently visited [facility]. It is possible you will see health information before a provider has talked with you about it. This kind of information can be easy to misunderstand. To help you fully understand what it means for your health, we urge you to discuss this note with your provider.     Ysabel Rucker PA-C  01/24/24 [id number]

## 2024-01-24 NOTE — ED TRIAGE NOTES
Pt to ED via personal vehicle with complaints of lower back pain that radiates down R foot. Pt states pain started last night. 6/10

## 2024-01-24 NOTE — ED PROVIDER NOTES
EMERGENCY DEPARTMENT MD ATTESTATION NOTE    SHARED VISIT: This visit was performed by BOTH a physician and an APC. The substantive portion of the medical decision making was performed by this attesting physician who made or approved the management plan and takes responsibility for patient management. All studies documented in the APC note (if performed) were independently interpreted by me.    The MONO and I have discussed this patient's history, physical exam, MDM, and treatment plan.  I have reviewed the documentation and personally had a face to face interaction with the patient. The attached note describes my personal findings.        Room Number:  08/08  PCP: Meeta Bolanos APRN  Independent Historians: Patient    HPI:    Context: Alejo Pascal is a 33 y.o. female with a medical history of anxiety and chronic back pain who presents to the ED c/o acute back pain.  Pain is located in her low back.  Patient had difficulty with work secondary to the pain.  Pain level radiates down the right side of her back.  Patient has had significant workup in the past including multiple specialists and MRIs.        Review of prior external notes (non-ED) -and- Review of prior external test results outside of this encounter: 8/3/2023 visit with neurosurgery notable for lumbar radiculopathy and degenerative disc disease.  Plan at that time was to proceed with nonsurgical options and treat conservatively      PHYSICAL EXAM    I have reviewed the triage vital signs and nursing notes.    ED Triage Vitals   Temp Heart Rate Resp BP SpO2   01/24/24 0127 01/24/24 0127 01/24/24 0127 01/24/24 0131 01/24/24 0127   98.4 °F (36.9 °C) 119 18 125/96 99 %      Temp src Heart Rate Source Patient Position BP Location FiO2 (%)   01/24/24 0127 01/24/24 0127 -- -- --   Tympanic Monitor          Physical Exam  GENERAL: alert, no acute distress  SKIN: Warm, dry  HENT: Normocephalic, atraumatic  EYES: no scleral icterus  CV: regular rhythm, regular  rate  RESPIRATORY: normal effort, lungs clear  ABDOMEN: soft, nontender, nondistended  MUSCULOSKELETAL: no deformity.  Tenderness to palpation in the right lumbar and paraspinal region  NEURO: alert, moves all extremities, follows commands      MEDICATIONS GIVEN IN ER  Medications   ketorolac (TORADOL) injection 60 mg (60 mg Intramuscular Given 1/24/24 0157)   methocarbamol (ROBAXIN) tablet 750 mg (750 mg Oral Given 1/24/24 0157)         ORDERS PLACED DURING THIS VISIT:  Orders Placed This Encounter   Procedures    Urinalysis With Culture If Indicated - Urine, Clean Catch         PROGRESS, DATA ANALYSIS, CONSULTS, AND MEDICAL DECISION MAKING  All labs have been independently interpreted by me.  All radiology studies have been reviewed by me. All EKG's have been independently viewed and interpreted by me.  Discussion below represents my analysis of pertinent findings related to patient's condition, differential diagnosis, treatment plan and final disposition.    Differential diagnosis includes but is not limited to acute on chronic back pain, muscle strain, muscle spasm.      ED Course as of 01/24/24 0317 Wed Jan 24, 2024   0219 I discussed the case with Dr. Austin and they agree to evaluate the patient at the bedside.    [CC]   0308 Ketones, UA(!): 40 mg/dL (2+) [CC]   0309 I rechecked the patient.  I discussed the patient's labs, radiology findings (including all incidental findings), diagnosis, and plan for discharge.  A repeat exam reveals no new worrisome changes from my initial exam findings.  The patient understands that the fact that they are being discharged does not denote that nothing is abnormal, it indicates that no clinical emergency is present and that they must follow-up as directed in order to properly maintain their health.  Follow-up instructions (specifically listed below) and return to ER precautions were given at this time.  I specifically instructed the patient to follow-up with their PCP.   The patient understands and agrees with the plan, and is ready for discharge.  All questions answered.   [CC]      ED Course User Index  [CC] Ysabel Rucker, ANGUS       MDM: 33-year-old female presenting for evaluation of right-sided back pain.  Pain appears to be acute on chronic with no indication for emergent imaging at this time.  No red flag symptoms.  Patient be treated symptomatically and discharged follow-up on outpatient basis      COMPLEXITY OF CARE  Admission was considered but after careful review of the patient's presentation, physical examination, diagnostic results, and response to treatment the patient may be safely discharged with outpatient follow-up.    Please note that portions of this document were completed with a voice recognition program.    Note Disclaimer: At [facility], we believe that sharing information builds trust and better relationships. You are receiving this note because you recently visited [facility]. It is possible you will see health information before a provider has talked with you about it. This kind of information can be easy to misunderstand. To help you fully understand what it means for your health, we urge you to discuss this note with your provider.         Odell Austin MD  01/24/24 [id number]

## 2024-01-24 NOTE — DISCHARGE INSTRUCTIONS
Please follow-up with your PCP.    Rest.   Ice as needed.     Although you are being discharged in the ED today, I encouraged her to return for worsening symptoms.  Things can, and do, change such a treatment at home with medication may not be adequate.  Specifically I recommend returning for chest pain or discomfort, difficulty breathing, persistent vomiting or difficulty holding down liquids or medications, fever > 102.0 F,  or any other worsening or alarming symptoms.       You have been evaluated in the emergency department for your presenting symptoms and deemed appropriate for discharge home.  Understand that your health care does not end here today.  It is important that your primary care physician be made aware of your visit.  I recommend calling your primary care provider in the next 48 hours to arrange follow-up care.  Your primary care provider needs to review your treatment and recovery to ensure that no further treatment is necessary.  Additional testing or procedures may be necessary as an outpatient at the discretion of your primary care provider.    I also recommend following up with your PCP for recheck of your blood pressure and treatment as needed.

## 2024-04-21 ENCOUNTER — APPOINTMENT (OUTPATIENT)
Dept: GENERAL RADIOLOGY | Facility: HOSPITAL | Age: 34
End: 2024-04-21
Payer: COMMERCIAL

## 2024-04-21 ENCOUNTER — HOSPITAL ENCOUNTER (EMERGENCY)
Facility: HOSPITAL | Age: 34
Discharge: HOME | End: 2024-04-21
Attending: EMERGENCY MEDICINE | Admitting: EMERGENCY MEDICINE
Payer: COMMERCIAL

## 2024-04-21 VITALS
OXYGEN SATURATION: 94 % | DIASTOLIC BLOOD PRESSURE: 88 MMHG | TEMPERATURE: 97.5 F | SYSTOLIC BLOOD PRESSURE: 117 MMHG | BODY MASS INDEX: 41.78 KG/M2 | HEART RATE: 99 BPM | RESPIRATION RATE: 18 BRPM | HEIGHT: 66 IN | WEIGHT: 260 LBS

## 2024-04-21 DIAGNOSIS — S93.401A SPRAIN OF RIGHT ANKLE, UNSPECIFIED LIGAMENT, INITIAL ENCOUNTER: Primary | ICD-10-CM

## 2024-04-21 PROCEDURE — 99283 EMERGENCY DEPT VISIT LOW MDM: CPT

## 2024-04-21 PROCEDURE — 73610 X-RAY EXAM OF ANKLE: CPT

## 2024-04-21 RX ORDER — NAPROXEN 500 MG/1
500 TABLET ORAL 2 TIMES DAILY PRN
Qty: 20 TABLET | Refills: 0 | Status: SHIPPED | OUTPATIENT
Start: 2024-04-21

## 2024-04-21 RX ORDER — METHOCARBAMOL 750 MG/1
750 TABLET, FILM COATED ORAL 3 TIMES DAILY PRN
Qty: 21 TABLET | Refills: 0 | Status: SHIPPED | OUTPATIENT
Start: 2024-04-21

## 2024-04-21 NOTE — ED PROVIDER NOTES
EMERGENCY DEPARTMENT ENCOUNTER    Room Number:  12/12  PCP: Meeta Bolanos APRN  Patient Care Team:  Meeta Bolanos APRN as PCP - General (Nurse Practitioner)   Independent Historians: Patient, significant other    HPI:  Chief Complaint: Cold injury  A complete HPI/ROS/PMH/PSH/SH/FH are unobtainable due to: None    Chronic or social conditions impacting patient care (Social Determinants of Health): None  (Financial Resource Strain / Food Insecurity / Transportation Needs / Physical Activity / Stress / Social Connections / Intimate Partner Violence / Housing Stability)    Context: Alejo Pascal is a 33 y.o. female who presents to the ED c/o acute injury to right ankle.  Patient states been drinking this evening and rolled her ankle while walking.  Patient significant other corroborates the story.  States she did not strike her head no other injury.  Patient is not intoxicated significantly does not appear to be intoxicated.    Review of prior external notes (non-ED) -and- Review of prior external test results outside of this encounter: None    Prescription drug monitoring program review:         PAST MEDICAL HISTORY  Active Ambulatory Problems     Diagnosis Date Noted    No Active Ambulatory Problems     Resolved Ambulatory Problems     Diagnosis Date Noted    No Resolved Ambulatory Problems     Past Medical History:   Diagnosis Date    Anxiety     Panic attack          PAST SURGICAL HISTORY  Past Surgical History:   Procedure Laterality Date    CHOLECYSTECTOMY      TUBAL ABDOMINAL LIGATION           FAMILY HISTORY  No family history on file.      SOCIAL HISTORY  Social History     Socioeconomic History    Marital status: Single   Tobacco Use    Smoking status: Every Day     Current packs/day: 1.50     Average packs/day: 1.5 packs/day for 15.0 years (22.5 ttl pk-yrs)     Types: Cigarettes    Smokeless tobacco: Never   Vaping Use    Vaping status: Never Used   Substance and Sexual Activity    Alcohol use: Yes      Comment: 4 mikes hard drinks on days not working, pt works 4 days a week    Drug use: Not Currently     Frequency: 1.0 times per week     Comment: previously used Ecstacy once a week, until 1 year ago    Sexual activity: Defer         ALLERGIES  Patient has no known allergies.        REVIEW OF SYSTEMS  Review of Systems  Included in HPI  All systems reviewed and negative except for those discussed in HPI.      PHYSICAL EXAM    I have reviewed the triage vital signs and nursing notes.    ED Triage Vitals   Temp Heart Rate Resp BP SpO2   04/21/24 0250 04/21/24 0250 04/21/24 0250 04/21/24 0331 04/21/24 0250   97.5 °F (36.4 °C) 119 18 117/88 98 %      Temp src Heart Rate Source Patient Position BP Location FiO2 (%)   04/21/24 0250 04/21/24 0250 -- -- --   Tympanic Monitor          Physical Exam  GENERAL: alert, no acute distress  SKIN: Warm, dry  HENT: Normocephalic, atraumatic  EYES: no scleral icterus  CV: regular rhythm, regular rate  RESPIRATORY: normal effort, lungs clear  ABDOMEN: soft, nontender, nondistended  MUSCULOSKELETAL: no deformity, swelling overlying lateral malleolus of right ankle foot is well-perfused no significant bony tenderness to the forefoot or proximal tib-fib  NEURO: alert, moves all extremities, follows commands                                                               LAB RESULTS  No results found for this or any previous visit (from the past 24 hour(s)).    I ordered the above labs and independently reviewed the results.        RADIOLOGY  XR Ankle 3+ View Right    Result Date: 4/21/2024  Patient: ARNAUD CAMPA  Time Out: 04:00 Exam(s): XR RIGHT ANKLE EXAM:   XR Right Ankle Complete, 3 or More Views CLINICAL HISTORY:      Fall. TECHNIQUE:   Frontal, lateral and oblique views of the right ankle. COMPARISON:   No relevant prior studies available. FINDINGS:   Bones joints:  Unremarkable.  No acute fracture.  No dislocation.   Soft tissues:  Marked nonspecific soft tissue swelling of the  ankle most prominent anteriorly and overlying the lateral malleolus. IMPRESSION:       Marked nonspecific soft tissue swelling of the ankle most prominent anteriorly and overlying the lateral malleolus.     Electronically signed by Deborah Womack MD on 04-21-24 at 0400     I ordered the above noted radiological studies. Reviewed by me and discussed with radiologist.  See dictation for official radiology interpretation.      PROCEDURES    Procedures      MEDICATIONS GIVEN IN ER    Medications - No data to display      ORDERS PLACED DURING THIS VISIT:  Orders Placed This Encounter   Procedures    [facility] 09.  Air Cast Ankle Brace, 11.  Crutches; Yes; Yes; ankle sprain; Yes; Prevents Accomplishing MRADLs; Able to Safely Use Equipment; Mobility Deficit Can Be Sufficiently Resolved By Use of Equipment    XR Ankle 3+ View Right    Obtain & Apply The Following- Lower extremity; Ankle aircast    Crutches (fit & training)         PROGRESS, DATA ANALYSIS, CONSULTS, AND MEDICAL DECISION MAKING    All labs have been independently interpreted by me.  All radiology studies have been reviewed by me and discussed with radiologist dictating the report.   EKG's independently viewed and interpreted by me.  Discussion below represents my analysis of pertinent findings related to patient's condition, differential diagnosis, treatment plan and final disposition.    Differential diagnosis includes but is not limited to fracture, sprain, strain, dislocation.    Clinical Scores:              ED Course as of 04/21/24 0517   Sun Apr 21, 2024   0501 I have independently reviewed patient's right ankle x-ray; my interpretation is no fracture no dislocation [TJ]      ED Course User Index  [TJ] Dawson Ballesteros MD               PPE: The patient wore a mask and I wore an N95 mask throughout the entire patient encounter.       AS OF 05:17 EDT VITALS:    BP - 117/88  HR - 99  TEMP - 97.5 °F (36.4 °C) (Tympanic)  O2 SATS -  94%        DIAGNOSIS  Final diagnoses:   Sprain of right ankle, unspecified ligament, initial encounter         DISPOSITION  ED Disposition       ED Disposition   Discharge    Condition   Stable    Comment   --                  Note Disclaimer: At [facility], we believe that sharing information builds trust and better relationships. You are receiving this note because you recently visited [facility]. It is possible you will see health information before a provider has talked with you about it. This kind of information can be easy to misunderstand. To help you fully understand what it means for your health, we urge you to discuss this note with your provider.         Dawson Ballesteros MD  04/21/24 3421       Dawson Ballesteros MD  04/21/24 3645

## 2024-10-29 ENCOUNTER — OFFICE VISIT (OUTPATIENT)
Dept: OBSTETRICS AND GYNECOLOGY | Facility: CLINIC | Age: 34
End: 2024-10-29
Payer: COMMERCIAL

## 2024-10-29 VITALS
HEART RATE: 91 BPM | WEIGHT: 261.2 LBS | HEIGHT: 66 IN | BODY MASS INDEX: 41.98 KG/M2 | SYSTOLIC BLOOD PRESSURE: 115 MMHG | DIASTOLIC BLOOD PRESSURE: 81 MMHG

## 2024-10-29 DIAGNOSIS — N92.1 MENORRHAGIA WITH IRREGULAR CYCLE: ICD-10-CM

## 2024-10-29 DIAGNOSIS — Z32.02 NEGATIVE PREGNANCY TEST: Primary | ICD-10-CM

## 2024-10-29 DIAGNOSIS — N92.6 IRREGULAR MENSES: ICD-10-CM

## 2024-10-29 LAB
B-HCG UR QL: NEGATIVE
EXPIRATION DATE: NORMAL
INTERNAL NEGATIVE CONTROL: NEGATIVE
INTERNAL POSITIVE CONTROL: NORMAL
Lab: NORMAL

## 2024-10-29 RX ORDER — SODIUM CHLORIDE 9 MG/ML
40 INJECTION, SOLUTION INTRAVENOUS ONCE
OUTPATIENT
Start: 2024-10-29

## 2024-10-29 RX ORDER — SODIUM CHLORIDE 0.9 % (FLUSH) 0.9 %
10 SYRINGE (ML) INJECTION AS NEEDED
OUTPATIENT
Start: 2024-10-29

## 2024-10-29 RX ORDER — SODIUM CHLORIDE 0.9 % (FLUSH) 0.9 %
3 SYRINGE (ML) INJECTION EVERY 12 HOURS SCHEDULED
OUTPATIENT
Start: 2024-10-29

## 2024-10-29 NOTE — PROGRESS NOTES
"        REASON FOR FOLLOWUP/CHIEF COMPLAINT:  (patient here regarding having no period for 2 months. She then got one but it was very light and lasted 2 days)     HISTORY OF PRESENT ILLNESS: Patient mainly wanted to discuss her very heavy menses.  She usually has a menses every 28 days that last 7 days, for which are very heavy with clots.  She additionally has been diagnosed with an L5-S1 disc problem that causes pain and she states that the pain is significantly increased during her bleeding days.  She does use over-the-counter ibuprofen but has had no help with that.  She has had previous laparoscopic salpingectomy for sterilization.  We discussed the use of progestin IUD for cycle control as well as endometrial ablation.  The patient is interested in moving toward endometrial ablation in hopes that a significant reduction in the bleeding amount might translate to less pain that she experiences in her back as well.  Risk of bleeding, infection, uterine injury were discussed with the patient.      Past Medical History, Past Surgical History, Social History, Family History have been reviewed and are without significant changes except as mentioned.    Review of Systems   Review of Systems   Constitutional:  Negative for fever.   Genitourinary:  Positive for menstrual problem.   Musculoskeletal:  Positive for back pain.       Medications:  The current medication list was reviewed in the EMR    ALLERGIES:  No Known Allergies           Vitals:    10/29/24 0953   BP: 115/81   Pulse: 91   Weight: 118 kg (261 lb 3.2 oz)   Height: 167.6 cm (66\")     Physical Exam    CONSTITUTIONAL:  Vital signs reviewed.  No distress, looks comfortable.    PSYCHIATRIC:  Normal judgment and insight.  Normal mood and affect.       RECENT LABS:        ASSESSMENT/PLAN:  Alejo Pascal [unfilled]   1.  Menorrhagia.  Will move toward getting set up for endometrial ablation.  Will get baseline pelvic ultrasound prior to ablation.  "

## 2024-10-29 NOTE — Clinical Note
Notify patient that I would like her to come in for a pelvic ultrasound while we are working on getting her ablation scheduled.

## 2024-11-06 PROBLEM — N92.1 MENORRHAGIA WITH IRREGULAR CYCLE: Status: ACTIVE | Noted: 2024-10-29

## 2024-11-20 ENCOUNTER — TELEPHONE (OUTPATIENT)
Dept: OBSTETRICS AND GYNECOLOGY | Facility: CLINIC | Age: 34
End: 2024-11-20
Payer: COMMERCIAL

## 2024-12-13 ENCOUNTER — TELEPHONE (OUTPATIENT)
Dept: OBSTETRICS AND GYNECOLOGY | Facility: CLINIC | Age: 34
End: 2024-12-13
Payer: COMMERCIAL

## 2024-12-16 ENCOUNTER — TELEPHONE (OUTPATIENT)
Dept: OBSTETRICS AND GYNECOLOGY | Facility: CLINIC | Age: 34
End: 2024-12-16
Payer: COMMERCIAL

## 2024-12-17 ENCOUNTER — TELEPHONE (OUTPATIENT)
Dept: OBSTETRICS AND GYNECOLOGY | Facility: CLINIC | Age: 34
End: 2024-12-17
Payer: COMMERCIAL

## 2025-01-09 ENCOUNTER — PRE-ADMISSION TESTING (OUTPATIENT)
Dept: PREADMISSION TESTING | Facility: HOSPITAL | Age: 35
End: 2025-01-09
Payer: COMMERCIAL

## 2025-01-09 VITALS
BODY MASS INDEX: 43.71 KG/M2 | WEIGHT: 272 LBS | RESPIRATION RATE: 18 BRPM | SYSTOLIC BLOOD PRESSURE: 119 MMHG | HEART RATE: 92 BPM | HEIGHT: 66 IN | DIASTOLIC BLOOD PRESSURE: 85 MMHG | OXYGEN SATURATION: 97 % | TEMPERATURE: 98.2 F

## 2025-01-09 LAB
ANION GAP SERPL CALCULATED.3IONS-SCNC: 10.1 MMOL/L (ref 5–15)
BUN SERPL-MCNC: 11 MG/DL (ref 6–20)
BUN/CREAT SERPL: 15.5 (ref 7–25)
CALCIUM SPEC-SCNC: 8.7 MG/DL (ref 8.6–10.5)
CHLORIDE SERPL-SCNC: 103 MMOL/L (ref 98–107)
CO2 SERPL-SCNC: 25.9 MMOL/L (ref 22–29)
CREAT SERPL-MCNC: 0.71 MG/DL (ref 0.57–1)
DEPRECATED RDW RBC AUTO: 43.3 FL (ref 37–54)
EGFRCR SERPLBLD CKD-EPI 2021: 113.9 ML/MIN/1.73
ERYTHROCYTE [DISTWIDTH] IN BLOOD BY AUTOMATED COUNT: 12.7 % (ref 12.3–15.4)
GLUCOSE SERPL-MCNC: 136 MG/DL (ref 65–99)
HCT VFR BLD AUTO: 41.5 % (ref 34–46.6)
HGB BLD-MCNC: 14.4 G/DL (ref 12–15.9)
MCH RBC QN AUTO: 32.4 PG (ref 26.6–33)
MCHC RBC AUTO-ENTMCNC: 34.7 G/DL (ref 31.5–35.7)
MCV RBC AUTO: 93.5 FL (ref 79–97)
PLATELET # BLD AUTO: 207 10*3/MM3 (ref 140–450)
PMV BLD AUTO: 9.7 FL (ref 6–12)
POTASSIUM SERPL-SCNC: 3.9 MMOL/L (ref 3.5–5.2)
RBC # BLD AUTO: 4.44 10*6/MM3 (ref 3.77–5.28)
SODIUM SERPL-SCNC: 139 MMOL/L (ref 136–145)
WBC NRBC COR # BLD AUTO: 7.03 10*3/MM3 (ref 3.4–10.8)

## 2025-01-09 PROCEDURE — 36415 COLL VENOUS BLD VENIPUNCTURE: CPT

## 2025-01-09 PROCEDURE — 80048 BASIC METABOLIC PNL TOTAL CA: CPT

## 2025-01-09 PROCEDURE — 85027 COMPLETE CBC AUTOMATED: CPT

## 2025-01-09 RX ORDER — SEMAGLUTIDE 1 MG/.5ML
INJECTION, SOLUTION SUBCUTANEOUS
COMMUNITY
Start: 2024-11-19

## 2025-01-09 RX ORDER — SEMAGLUTIDE 0.5 MG/.5ML
0.5 INJECTION, SOLUTION SUBCUTANEOUS
COMMUNITY
Start: 2025-01-08

## 2025-01-09 RX ORDER — QUETIAPINE FUMARATE 200 MG/1
200 TABLET, FILM COATED ORAL NIGHTLY
COMMUNITY

## 2025-01-09 RX ORDER — CLONIDINE HYDROCHLORIDE 0.1 MG/1
0.1 TABLET ORAL AS NEEDED
COMMUNITY

## 2025-01-09 NOTE — DISCHARGE INSTRUCTIONS
Take the following medications the morning of surgery:  NONE      If you are on prescription narcotic pain medication to control your pain you may also take that medication the morning of surgery.      General Instructions:     Do not eat solid food after midnight the night before surgery.  Clear liquids day of surgery are allowed but must be stopped at least two hours before your hospital arrival time.       Allowed clear liquids      Water, sodas, and tea or coffee with no cream or milk added.       12 to 20 ounces of a clear liquid that contains carbohydrates is recommended.  If non-diabetic, have Gatorade or Powerade.  If diabetic, have G2 or Powerade Zero.     Do not have liquids red in color.  Do not consume chicken, beef, pork or vegetable broth or bouillon cubes of any variety as they are not considered clear liquids and are not allowed.      Infants may have breast milk up to four hours before surgery.  Infants drinking formula may drink formula up to six hours before surgery.   Patients who avoid smoking, chewing tobacco and alcohol for 4 weeks prior to surgery have a reduced risk of post-operative complications.  Quit smoking as many days before surgery as you can.  Do not smoke, use chewing tobacco or drink alcohol the day of surgery.   If applicable bring your C-PAP/ BI-PAP machine in with you to preop day of surgery.  Bring any papers given to you in the doctor’s office.  Wear clean comfortable clothes.  Do not wear contact lenses, false eyelashes or make-up.  Bring a case for your glasses.   Bring crutches or walker if applicable.  Remove all piercings.  Leave jewelry and any other valuables at home.  Hair extensions with metal clips must be removed prior to surgery.  The Pre-Admission Testing nurse will instruct you to bring medications if unable to obtain an accurate list in Pre-Admission Testing.          Preventing a Surgical Site Infection:  For 2 to 3 days before surgery, avoid shaving with a  razor because the razor can irritate skin and make it easier to develop an infection.    Any areas of open skin can increase the risk of a post-operative wound infection by allowing bacteria to enter and travel throughout the body.  Notify your surgeon if you have any skin wounds / rashes even if it is not near the expected surgical site.  The area will need assessed to determine if surgery should be delayed until it is healed.  The night prior to surgery shower using a fresh bar of anti-bacterial soap (such as Dial) and clean washcloth.  Sleep in a clean bed with clean clothing.  Do not allow pets to sleep with you.  Shower on the morning of surgery using a fresh bar of anti-bacterial soap (such as Dial) and clean washcloth.  Dry with a clean towel and dress in clean clothing.  Ask your surgeon if you will be receiving antibiotics prior to surgery.  Make sure you, your family, and all healthcare providers clean their hands with soap and water or an alcohol based hand  before caring for you or your wound.    Day of surgery:  Your arrival time is approximately two hours before your scheduled surgery time.  Please note if you have an early arrival time the surgery doors do not open before 5:00 AM.  Upon arrival, a Pre-op nurse and Anesthesiologist will review your health history, obtain vital signs, and answer questions you may have.  The only belongings needed at this time will be a list of your home medications and if applicable your C-PAP/BI-PAP machine.  A Pre-op nurse will start an IV and you may receive medication in preparation for surgery, including something to help you relax.     Please be aware that surgery does come with discomfort.  We want to make every effort to control your discomfort so please discuss any uncontrolled symptoms with your nurse.   Your doctor will most likely have prescribed pain medications.      If you are going home after surgery you will receive individualized written care  instructions before being discharged.  A responsible adult must drive you to and from the hospital on the day of your surgery and ideally stay with you through the night.   .  Discharge prescriptions can be filled by the hospital pharmacy during regular pharmacy hours.  If you are having surgery late in the day/evening your prescription may be e-prescribed to your pharmacy.  Please verify your pharmacy hours or chose a 24 hour pharmacy to avoid not having access to your prescription because your pharmacy has closed for the day.    If you are staying overnight following surgery, you will be transported to your hospital room following the recovery period.  [facility] has all private rooms.    If you have any questions please call Pre-Admission Testing at [phone].  Deductibles and co-payments are collected on the day of service. Please be prepared to pay the required co-pay, deductible or deposit on the day of service as defined by your plan.    Call your surgeon immediately if you experience any of the following symptoms:  Sore Throat  Shortness of Breath or difficulty breathing  Cough  Chills  Body soreness or muscle pain  Headache  Fever  New loss of taste or smell  Do not arrive for your surgery ill.  Your procedure will need to be rescheduled to another time.  You will need to call your physician before the day of surgery to avoid any unnecessary exposure to hospital staff as well as other patients.              CHLORHEXIDINE CLOTH INSTRUCTIONS  The morning of surgery follow these instructions using the Chlorhexidine cloths you've been given.  These steps reduce bacteria on the body.  Do not use the cloths near your eyes, ears mouth, genitalia or on open wounds.  Throw the cloths away after use but do not try to flush them down a toilet.      Open and remove one cloth at a time from the package.    Leave the cloth unfolded and begin the bathing.  Massage the skin with the cloths using gentle  pressure to remove bacteria.  Do not scrub harshly.   Follow the steps below with one 2% CHG cloth per area (6 total cloths).  One cloth for neck, shoulders and chest.  One cloth for both arms, hands, fingers and underarms (do underarms last).  One cloth for the abdomen followed by groin.  One cloth for right leg and foot including between the toes.  One cloth for left leg and foot including between the toes.  The last cloth is to be used for the back of the neck, back and buttocks.    Allow the CHG to air dry 3 minutes on the skin which will give it time to work and decrease the chance of irritation.  The skin may feel sticky until it is dry.  Do not rinse with water or any other liquid or you will lose the beneficial effects of the CHG.  If mild skin irritation occurs, do rinse the skin to remove the CHG.  Report this to the nurse at time of admission.  Do not apply lotions, creams, ointments, deodorants or perfumes after using the clothes. Dress in clean clothes before coming to the hospital.

## 2025-01-16 ENCOUNTER — ANESTHESIA EVENT (OUTPATIENT)
Dept: PERIOP | Facility: HOSPITAL | Age: 35
End: 2025-01-16
Payer: COMMERCIAL

## 2025-01-16 ENCOUNTER — ANESTHESIA (OUTPATIENT)
Dept: PERIOP | Facility: HOSPITAL | Age: 35
End: 2025-01-16
Payer: COMMERCIAL

## 2025-01-16 ENCOUNTER — HOSPITAL ENCOUNTER (OUTPATIENT)
Facility: HOSPITAL | Age: 35
Setting detail: HOSPITAL OUTPATIENT SURGERY
Discharge: HOME | End: 2025-01-16
Attending: OBSTETRICS & GYNECOLOGY | Admitting: OBSTETRICS & GYNECOLOGY
Payer: COMMERCIAL

## 2025-01-16 VITALS
OXYGEN SATURATION: 94 % | RESPIRATION RATE: 16 BRPM | DIASTOLIC BLOOD PRESSURE: 86 MMHG | TEMPERATURE: 97.5 F | SYSTOLIC BLOOD PRESSURE: 116 MMHG | HEART RATE: 67 BPM

## 2025-01-16 DIAGNOSIS — N92.1 MENORRHAGIA WITH IRREGULAR CYCLE: ICD-10-CM

## 2025-01-16 LAB
B-HCG UR QL: NEGATIVE
EXPIRATION DATE: NORMAL
INTERNAL NEGATIVE CONTROL: NEGATIVE
INTERNAL POSITIVE CONTROL: POSITIVE
Lab: NORMAL

## 2025-01-16 PROCEDURE — 25010000002 LIDOCAINE 2% SOLUTION

## 2025-01-16 PROCEDURE — 25010000002 PROPOFOL 200 MG/20ML EMULSION

## 2025-01-16 PROCEDURE — 25010000002 MIDAZOLAM PER 1 MG: Performed by: ANESTHESIOLOGY

## 2025-01-16 PROCEDURE — 25010000002 GLYCOPYRROLATE 0.2 MG/ML SOLUTION

## 2025-01-16 PROCEDURE — 25810000003 LACTATED RINGERS PER 1000 ML: Performed by: ANESTHESIOLOGY

## 2025-01-16 PROCEDURE — 25010000002 HYDROMORPHONE PER 4 MG: Performed by: NURSE ANESTHETIST, CERTIFIED REGISTERED

## 2025-01-16 PROCEDURE — 25010000002 FENTANYL CITRATE (PF) 50 MCG/ML SOLUTION

## 2025-01-16 PROCEDURE — 88305 TISSUE EXAM BY PATHOLOGIST: CPT | Performed by: OBSTETRICS & GYNECOLOGY

## 2025-01-16 PROCEDURE — 25010000002 DEXAMETHASONE SODIUM PHOSPHATE 20 MG/5ML SOLUTION

## 2025-01-16 PROCEDURE — 81025 URINE PREGNANCY TEST: CPT | Performed by: ANESTHESIOLOGY

## 2025-01-16 PROCEDURE — 58563 HYSTEROSCOPY ABLATION: CPT | Performed by: OBSTETRICS & GYNECOLOGY

## 2025-01-16 PROCEDURE — S0260 H&P FOR SURGERY: HCPCS | Performed by: OBSTETRICS & GYNECOLOGY

## 2025-01-16 PROCEDURE — 25010000002 ONDANSETRON PER 1 MG: Performed by: NURSE ANESTHETIST, CERTIFIED REGISTERED

## 2025-01-16 PROCEDURE — 25010000002 KETOROLAC TROMETHAMINE PER 15 MG: Performed by: NURSE ANESTHETIST, CERTIFIED REGISTERED

## 2025-01-16 RX ORDER — ONDANSETRON 2 MG/ML
4 INJECTION INTRAMUSCULAR; INTRAVENOUS ONCE AS NEEDED
Status: COMPLETED | OUTPATIENT
Start: 2025-01-16 | End: 2025-01-16

## 2025-01-16 RX ORDER — SODIUM CHLORIDE 0.9 % (FLUSH) 0.9 %
10 SYRINGE (ML) INJECTION AS NEEDED
Status: DISCONTINUED | OUTPATIENT
Start: 2025-01-16 | End: 2025-01-16 | Stop reason: HOSPADM

## 2025-01-16 RX ORDER — SODIUM CHLORIDE 9 MG/ML
40 INJECTION, SOLUTION INTRAVENOUS AS NEEDED
Status: DISCONTINUED | OUTPATIENT
Start: 2025-01-16 | End: 2025-01-16 | Stop reason: HOSPADM

## 2025-01-16 RX ORDER — FLUMAZENIL 0.1 MG/ML
0.2 INJECTION INTRAVENOUS AS NEEDED
Status: DISCONTINUED | OUTPATIENT
Start: 2025-01-16 | End: 2025-01-16 | Stop reason: HOSPADM

## 2025-01-16 RX ORDER — IPRATROPIUM BROMIDE AND ALBUTEROL SULFATE 2.5; .5 MG/3ML; MG/3ML
3 SOLUTION RESPIRATORY (INHALATION) ONCE AS NEEDED
Status: DISCONTINUED | OUTPATIENT
Start: 2025-01-16 | End: 2025-01-16 | Stop reason: HOSPADM

## 2025-01-16 RX ORDER — PROMETHAZINE HYDROCHLORIDE 25 MG/1
25 TABLET ORAL ONCE AS NEEDED
Status: DISCONTINUED | OUTPATIENT
Start: 2025-01-16 | End: 2025-01-16 | Stop reason: HOSPADM

## 2025-01-16 RX ORDER — LABETALOL HYDROCHLORIDE 5 MG/ML
5 INJECTION, SOLUTION INTRAVENOUS
Status: DISCONTINUED | OUTPATIENT
Start: 2025-01-16 | End: 2025-01-16 | Stop reason: HOSPADM

## 2025-01-16 RX ORDER — LIDOCAINE HYDROCHLORIDE 20 MG/ML
INJECTION, SOLUTION INFILTRATION; PERINEURAL AS NEEDED
Status: DISCONTINUED | OUTPATIENT
Start: 2025-01-16 | End: 2025-01-16 | Stop reason: SURG

## 2025-01-16 RX ORDER — HYDROCODONE BITARTRATE AND ACETAMINOPHEN 5; 325 MG/1; MG/1
1 TABLET ORAL EVERY 4 HOURS PRN
Qty: 6 TABLET | Refills: 0 | Status: SHIPPED | OUTPATIENT
Start: 2025-01-16

## 2025-01-16 RX ORDER — DIPHENHYDRAMINE HYDROCHLORIDE 50 MG/ML
12.5 INJECTION INTRAMUSCULAR; INTRAVENOUS
Status: DISCONTINUED | OUTPATIENT
Start: 2025-01-16 | End: 2025-01-16 | Stop reason: HOSPADM

## 2025-01-16 RX ORDER — HYDROCODONE BITARTRATE AND ACETAMINOPHEN 5; 325 MG/1; MG/1
1 TABLET ORAL ONCE AS NEEDED
Status: DISCONTINUED | OUTPATIENT
Start: 2025-01-16 | End: 2025-01-16 | Stop reason: HOSPADM

## 2025-01-16 RX ORDER — GLYCOPYRROLATE 0.2 MG/ML
INJECTION INTRAMUSCULAR; INTRAVENOUS AS NEEDED
Status: DISCONTINUED | OUTPATIENT
Start: 2025-01-16 | End: 2025-01-16 | Stop reason: SURG

## 2025-01-16 RX ORDER — SODIUM CHLORIDE 9 MG/ML
40 INJECTION, SOLUTION INTRAVENOUS ONCE
Status: DISCONTINUED | OUTPATIENT
Start: 2025-01-16 | End: 2025-01-16 | Stop reason: HOSPADM

## 2025-01-16 RX ORDER — HYDRALAZINE HYDROCHLORIDE 20 MG/ML
5 INJECTION INTRAMUSCULAR; INTRAVENOUS
Status: DISCONTINUED | OUTPATIENT
Start: 2025-01-16 | End: 2025-01-16 | Stop reason: HOSPADM

## 2025-01-16 RX ORDER — FENTANYL CITRATE 50 UG/ML
50 INJECTION, SOLUTION INTRAMUSCULAR; INTRAVENOUS
Status: DISCONTINUED | OUTPATIENT
Start: 2025-01-16 | End: 2025-01-16 | Stop reason: HOSPADM

## 2025-01-16 RX ORDER — MIDAZOLAM HYDROCHLORIDE 1 MG/ML
1 INJECTION, SOLUTION INTRAMUSCULAR; INTRAVENOUS
Status: DISCONTINUED | OUTPATIENT
Start: 2025-01-16 | End: 2025-01-16 | Stop reason: HOSPADM

## 2025-01-16 RX ORDER — DEXAMETHASONE SODIUM PHOSPHATE 4 MG/ML
INJECTION, SOLUTION INTRA-ARTICULAR; INTRALESIONAL; INTRAMUSCULAR; INTRAVENOUS; SOFT TISSUE AS NEEDED
Status: DISCONTINUED | OUTPATIENT
Start: 2025-01-16 | End: 2025-01-16 | Stop reason: SURG

## 2025-01-16 RX ORDER — EPHEDRINE SULFATE 50 MG/ML
5 INJECTION, SOLUTION INTRAVENOUS ONCE AS NEEDED
Status: DISCONTINUED | OUTPATIENT
Start: 2025-01-16 | End: 2025-01-16 | Stop reason: HOSPADM

## 2025-01-16 RX ORDER — FENTANYL CITRATE 50 UG/ML
INJECTION, SOLUTION INTRAMUSCULAR; INTRAVENOUS AS NEEDED
Status: DISCONTINUED | OUTPATIENT
Start: 2025-01-16 | End: 2025-01-16 | Stop reason: SURG

## 2025-01-16 RX ORDER — HYDROMORPHONE HYDROCHLORIDE 1 MG/ML
0.5 INJECTION, SOLUTION INTRAMUSCULAR; INTRAVENOUS; SUBCUTANEOUS
Status: DISCONTINUED | OUTPATIENT
Start: 2025-01-16 | End: 2025-01-16 | Stop reason: HOSPADM

## 2025-01-16 RX ORDER — SODIUM CHLORIDE 0.9 % (FLUSH) 0.9 %
3 SYRINGE (ML) INJECTION EVERY 12 HOURS SCHEDULED
Status: DISCONTINUED | OUTPATIENT
Start: 2025-01-16 | End: 2025-01-16 | Stop reason: HOSPADM

## 2025-01-16 RX ORDER — KETOROLAC TROMETHAMINE 30 MG/ML
INJECTION, SOLUTION INTRAMUSCULAR; INTRAVENOUS AS NEEDED
Status: DISCONTINUED | OUTPATIENT
Start: 2025-01-16 | End: 2025-01-16 | Stop reason: SURG

## 2025-01-16 RX ORDER — ONDANSETRON 2 MG/ML
INJECTION INTRAMUSCULAR; INTRAVENOUS AS NEEDED
Status: DISCONTINUED | OUTPATIENT
Start: 2025-01-16 | End: 2025-01-16 | Stop reason: SURG

## 2025-01-16 RX ORDER — PROPOFOL 10 MG/ML
INJECTION, EMULSION INTRAVENOUS AS NEEDED
Status: DISCONTINUED | OUTPATIENT
Start: 2025-01-16 | End: 2025-01-16 | Stop reason: SURG

## 2025-01-16 RX ORDER — OXYCODONE AND ACETAMINOPHEN 7.5; 325 MG/1; MG/1
1 TABLET ORAL EVERY 4 HOURS PRN
Status: DISCONTINUED | OUTPATIENT
Start: 2025-01-16 | End: 2025-01-16 | Stop reason: HOSPADM

## 2025-01-16 RX ORDER — DEXMEDETOMIDINE HYDROCHLORIDE 100 UG/ML
INJECTION, SOLUTION INTRAVENOUS AS NEEDED
Status: DISCONTINUED | OUTPATIENT
Start: 2025-01-16 | End: 2025-01-16 | Stop reason: SURG

## 2025-01-16 RX ORDER — SODIUM CHLORIDE 0.9 % (FLUSH) 0.9 %
10 SYRINGE (ML) INJECTION EVERY 12 HOURS SCHEDULED
Status: DISCONTINUED | OUTPATIENT
Start: 2025-01-16 | End: 2025-01-16 | Stop reason: HOSPADM

## 2025-01-16 RX ORDER — ATROPINE SULFATE 0.4 MG/ML
0.4 INJECTION, SOLUTION INTRAMUSCULAR; INTRAVENOUS; SUBCUTANEOUS ONCE AS NEEDED
Status: DISCONTINUED | OUTPATIENT
Start: 2025-01-16 | End: 2025-01-16 | Stop reason: HOSPADM

## 2025-01-16 RX ORDER — PROMETHAZINE HYDROCHLORIDE 25 MG/1
25 SUPPOSITORY RECTAL ONCE AS NEEDED
Status: DISCONTINUED | OUTPATIENT
Start: 2025-01-16 | End: 2025-01-16 | Stop reason: HOSPADM

## 2025-01-16 RX ORDER — SODIUM CHLORIDE, SODIUM LACTATE, POTASSIUM CHLORIDE, CALCIUM CHLORIDE 600; 310; 30; 20 MG/100ML; MG/100ML; MG/100ML; MG/100ML
9 INJECTION, SOLUTION INTRAVENOUS CONTINUOUS PRN
Status: DISCONTINUED | OUTPATIENT
Start: 2025-01-16 | End: 2025-01-16 | Stop reason: HOSPADM

## 2025-01-16 RX ORDER — NALOXONE HCL 0.4 MG/ML
0.2 VIAL (ML) INJECTION AS NEEDED
Status: DISCONTINUED | OUTPATIENT
Start: 2025-01-16 | End: 2025-01-16 | Stop reason: HOSPADM

## 2025-01-16 RX ADMIN — FENTANYL CITRATE 25 MCG: 50 INJECTION, SOLUTION INTRAMUSCULAR; INTRAVENOUS at 13:55

## 2025-01-16 RX ADMIN — DEXMEDETOMIDINE HCL 4 MCG: 100 INJECTION INTRAVENOUS at 14:09

## 2025-01-16 RX ADMIN — SODIUM CHLORIDE, SODIUM LACTATE, POTASSIUM CHLORIDE, CALCIUM CHLORIDE 9 ML/HR: 20; 30; 600; 310 INJECTION, SOLUTION INTRAVENOUS at 12:19

## 2025-01-16 RX ADMIN — OXYCODONE AND ACETAMINOPHEN 1 TABLET: 7.5; 325 TABLET ORAL at 14:42

## 2025-01-16 RX ADMIN — HYDROMORPHONE HYDROCHLORIDE 0.5 MG: 1 INJECTION, SOLUTION INTRAMUSCULAR; INTRAVENOUS; SUBCUTANEOUS at 15:00

## 2025-01-16 RX ADMIN — ONDANSETRON 4 MG: 2 INJECTION, SOLUTION INTRAMUSCULAR; INTRAVENOUS at 14:37

## 2025-01-16 RX ADMIN — PROPOFOL 200 MG: 10 INJECTION, EMULSION INTRAVENOUS at 13:48

## 2025-01-16 RX ADMIN — ONDANSETRON 4 MG: 2 INJECTION INTRAMUSCULAR; INTRAVENOUS at 14:09

## 2025-01-16 RX ADMIN — GLYCOPYRROLATE 0.1 MG: 0.2 INJECTION INTRAMUSCULAR; INTRAVENOUS at 13:48

## 2025-01-16 RX ADMIN — FENTANYL CITRATE 25 MCG: 50 INJECTION, SOLUTION INTRAMUSCULAR; INTRAVENOUS at 13:48

## 2025-01-16 RX ADMIN — LIDOCAINE HYDROCHLORIDE 100 MG: 20 INJECTION, SOLUTION INFILTRATION; PERINEURAL at 13:48

## 2025-01-16 RX ADMIN — HYDROMORPHONE HYDROCHLORIDE 0.5 MG: 1 INJECTION, SOLUTION INTRAMUSCULAR; INTRAVENOUS; SUBCUTANEOUS at 14:37

## 2025-01-16 RX ADMIN — DEXMEDETOMIDINE HCL 6 MCG: 100 INJECTION INTRAVENOUS at 13:55

## 2025-01-16 RX ADMIN — KETOROLAC TROMETHAMINE 30 MG: 30 INJECTION, SOLUTION INTRAMUSCULAR; INTRAVENOUS at 14:09

## 2025-01-16 RX ADMIN — DEXAMETHASONE SODIUM PHOSPHATE 8 MG: 4 INJECTION, SOLUTION INTRAMUSCULAR; INTRAVENOUS at 13:55

## 2025-01-16 RX ADMIN — MIDAZOLAM 1 MG: 1 INJECTION INTRAMUSCULAR; INTRAVENOUS at 12:19

## 2025-01-16 RX ADMIN — PROPOFOL 50 MG: 10 INJECTION, EMULSION INTRAVENOUS at 13:51

## 2025-01-16 NOTE — H&P
Patient Care Team:  Meeta Bolanos APRN as PCP - General (Nurse Practitioner)    Chief complaint heavy menstrual bleeding    Subjective     Patient is a 34 y.o. female presents withvery heavy menses. She usually has a menses every 28 days that last 7 days, for which are very heavy with clots. She additionally has been diagnosed with an L5-S1 disc problem that causes pain and she states that the pain is significantly increased during her bleeding days. She does use over-the-counter ibuprofen but has had no help with that. She has had previous laparoscopic salpingectomy for sterilization. We discussed the use of progestin IUD for cycle control as well as endometrial ablation. The patient is interested in moving toward endometrial ablation in hopes that a significant reduction in the bleeding amount might translate to less pain that she experiences in her back as well. Risk of bleeding, infection, uterine injury were discussed with the patient.     Review of Systems   Pertinent items are noted in HPI, all other systems reviewed and negative  History  Past Medical History:   Diagnosis Date    Anxiety     Asthma     GERD (gastroesophageal reflux disease)     Heavy menstrual bleeding     Panic attack      Past Surgical History:   Procedure Laterality Date    CHOLECYSTECTOMY      HERNIA REPAIR      AS A BABY    TRIGGER FINGER RELEASE Right     TUBAL ABDOMINAL LIGATION       Family History   Problem Relation Age of Onset    Malig Hyperthermia Neg Hx      Social History     Tobacco Use    Smoking status: Every Day     Current packs/day: 1.50     Average packs/day: 1.5 packs/day for 15.0 years (22.5 ttl pk-yrs)     Types: Cigarettes    Smokeless tobacco: Never   Vaping Use    Vaping status: Never Used   Substance Use Topics    Alcohol use: Yes     Comment: TWICE A WEEK    Drug use: Not Currently     Frequency: 1.0 times per week     Comment: previously used Ecstacy once a week, until 1 year ago     E-cigarette/Vaping     E-cigarette/Vaping Use Never User      E-cigarette/Vaping Substances     Medications Prior to Admission   Medication Sig Dispense Refill Last Dose/Taking    albuterol sulfate  (90 Base) MCG/ACT inhaler Inhale 2 puffs Every 6 (Six) Hours As Needed for Wheezing. 18 g 2 Past Month    naproxen (EC NAPROSYN) 500 MG EC tablet Take 1 tablet by mouth 2 (Two) Times a Day As Needed for Mild Pain. 20 tablet 0 Past Month    QUEtiapine (SEROquel) 200 MG tablet Take 1 tablet by mouth Every Night.   1/15/2025 Bedtime    clobetasol (TEMOVATE) 0.05 % cream Apply 1 application topically to the appropriate area as directed 2 (Two) Times a Day. (Patient not taking: Reported on 10/29/2024) 15 g 0 More than a month    cloNIDine (CATAPRES) 0.1 MG tablet Take 1 tablet by mouth As Needed for High Blood Pressure.   More than a month    Fluticasone-Salmeterol (ADVAIR/WIXELA) 500-50 MCG/ACT DISKUS Inhale 1 puff 2 (Two) Times a Day. (Patient not taking: Reported on 1/9/2025) 1 each 2 More than a month    methocarbamol (ROBAXIN) 750 MG tablet Take 1 tablet by mouth 3 (Three) Times a Day As Needed for Muscle Spasms. (Patient not taking: Reported on 10/29/2024) 21 tablet 0 More than a month    omeprazole (priLOSEC) 20 MG capsule Take 1 capsule by mouth Daily.   More than a month    ondansetron ODT (ZOFRAN-ODT) 4 MG disintegrating tablet Place 1 tablet on the tongue 4 (Four) Times a Day As Needed for Nausea or Vomiting. (Patient not taking: Reported on 10/29/2024) 15 tablet 0 More than a month    Wegovy 0.5 MG/0.5ML solution auto-injector Inject 0.5 mL under the skin into the appropriate area as directed.   More than a month     Allergies:  Patient has no known allergies.    Objective     Vital Signs  Temp:  [98.6 °F (37 °C)] 98.6 °F (37 °C)  Heart Rate:  [97] 97  Resp:  [18] 18  BP: (131)/(93) 131/93    Physical Exam:      General Appearance:  Alert, cooperative, in no acute distress   Head:  Normocephalic, without obvious abnormality,  atraumatic   Eyes:  Lids and lashes normal, conjunctivae and sclerae normal, no icterus, no pallor, corneas clear, PERRLA   Ears:  Ears appear intact with no abnormalities noted   Throat:  No oral lesions, no thrush, oral mucosa moist   Neck:  No adenopathy, supple, trachea midline, no thyromegaly, no carotid bruit, no JVD   Back:  No kyphosis present, no scoliosis present, no skin lesions, erythema or scars, no tenderness to percussion, or palpation, range of motion normal   Lungs:  Clear to auscultation,respirations regular, even and unlabored    Heart:  Regular rhythm and normal rate, normal S1 and S2, no murmur, no gallop, no rub, no click   Breast Exam:  Deferred   Abdomen:  Normal bowel sounds, no masses, no organomegaly, soft non-tender, non-distended, no guarding, no rebound tenderness   Genitalia:  Deferred   Extremities:  Moves all extremities well, no edema, no cyanosis, no redness   Pulses:  Pulses palpable and equal bilaterally   Skin:  No bleeding, bruising or rash   Lymph nodes:  No palpable adenopathy   Neurologic:  Cranial nerves 2 - 12 grossly intact, sensation intact, DTR present and equal bilaterally       Results Review:    I reviewed the patient's new clinical results.    Assessment & Plan       Menorrhagia with irregular cycle    Hysteroscopy, endometrial sampling and Novasure endometrial ablation.    I discussed the patients findings and my recommendations with patient.     Ric Collins MD  01/16/25  12:04 EST    Time:

## 2025-01-16 NOTE — ANESTHESIA PROCEDURE NOTES
Airway  Urgency: elective    Date/Time: 1/16/2025 1:50 PM    General Information and Staff    Patient location during procedure: OR  Anesthesiologist: Odell Rosa MD  CRNA/CAA: Tomsaa Becerril CRNA    Indications and Patient Condition  Indications for airway management: airway protection    Preoxygenated: yes  MILS not maintained throughout  Mask difficulty assessment: 1 - vent by mask    Final Airway Details  Final airway type: supraglottic airway      Successful airway: unique  Size 4     Number of attempts at approach: 1  Assessment: lips, teeth, and gum same as pre-op and atraumatic intubation

## 2025-01-16 NOTE — ANESTHESIA POSTPROCEDURE EVALUATION
Patient: Alejo Pascal    Procedure Summary       Date: 01/16/25 Room / Location: [facility] OR 09 / [facility] MAIN OR    Anesthesia Start: 1342 Anesthesia Stop: 1426    Procedure: DILATATION AND CURETTAGE HYSTEROSCOPY NOVASURE ENDOMETRIAL ABLATION (Vagina) Diagnosis:       Menorrhagia with irregular cycle      (Menorrhagia with irregular cycle [N92.1])    Surgeons: Ric Collins MD Provider: Odell Rosa MD    Anesthesia Type: general ASA Status: 3            Anesthesia Type: general    Vitals  Vitals Value Taken Time   /76 01/16/25 1503   Temp 36.4 °C (97.5 °F) 01/16/25 1500   Pulse 77 01/16/25 1508   Resp 16 01/16/25 1500   SpO2 96 % 01/16/25 1508   Vitals shown include unfiled device data.        Post Anesthesia Care and Evaluation    Patient location during evaluation: bedside  Patient participation: complete - patient participated  Level of consciousness: awake and alert  Pain management: adequate    Airway patency: patent  Anesthetic complications: No anesthetic complications    Cardiovascular status: acceptable  Respiratory status: acceptable  Hydration status: acceptable    Comments: /86 (BP Location: Left arm, Patient Position: Lying)   Pulse 67   Temp 36.4 °C (97.5 °F) (Oral)   Resp 16   LMP 01/14/2025 (Exact Date)   SpO2 94%

## 2025-01-16 NOTE — ADDENDUM NOTE
Addendum  created 01/16/25 1636 by Odell Rosa MD    Attestation recorded in Intraprocedure, Intraprocedure Attestations filed

## 2025-01-16 NOTE — DISCHARGE INSTRUCTIONS
Dilatation and Curettage, Care After  Refer to this sheet for the next few weeks. These instructions provide you with information on caring for yourself after your procedure. Your health care provider may also give you more specific instructions.  Your treatment has been planned according to current medical practices, but problems sometimes occur.  Call your health care provider if you have any problems or questions after your care.  HOME CARE INSTRUCTIONS  It is normal to have vaginal bleeding/abdominal cramping for the next 2 weeks.  Wait 1 week before returning to strenuous activity.  Drink enough fluids to keep your urine clear or pale yellow.  You may shower tomorrow.  Do not take a bath, go swimming or use a hot tub until your health care provider approves.  Only take over-the-counter or prescription medicines as directed by your health care provider.  Follow up with your provider as directed.    YOU WILL BE ON PELVIC REST UNTIL SPECIFIED BY YOUR PHYSICIAN. PELVIC REST INCLUDES:  Avoiding long periods of standing.  Avoiding heavy lifting, pushing or pulling.  DO NOT lift anything heavier than 10 pounds (4.5 kg)  DO NOT douche, or have sex (intercourse) after the procedure.    SEEK MEDICAL CARE IF:    You have increasing cramps or pain that is not relieved with medicine.  You have bad smelling vaginal discharge.  You are having problems with any medicine.  You have bleeding that is heavier than a normal menstrual period (greater than 1 pad/hour) or you notice large clots.  You have a fever > 101.  You have chest pain or shortness of breath.         Activity as tolerated.  May use tampons.  Will call with pathology report.  No follow up appointment needed.

## 2025-01-16 NOTE — ANESTHESIA PREPROCEDURE EVALUATION
Anesthesia Evaluation     Patient summary reviewed and Nursing notes reviewed   NPO Solid Status: > 8 hours  NPO Liquid Status: > 2 hours           Airway   Mallampati: II  TM distance: >3 FB  Neck ROM: full  No difficulty expected  Dental - normal exam   (+) poor dentition    Pulmonary - normal exam   (+) a smoker Current, asthma,  Cardiovascular - negative cardio ROS and normal exam  Exercise tolerance: good (4-7 METS)        Neuro/Psych  (+) psychiatric history Anxiety and Depression  GI/Hepatic/Renal/Endo    (+) morbid obesity, GERD    Musculoskeletal (-) negative ROS    Abdominal    Substance History - negative use     OB/GYN negative ob/gyn ROS         Other                    Anesthesia Plan    ASA 3     general     intravenous induction     Anesthetic plan, risks, benefits, and alternatives have been provided, discussed and informed consent has been obtained with: patient.    CODE STATUS:

## 2025-01-17 LAB
CYTO UR: NORMAL
LAB AP CASE REPORT: NORMAL
PATH REPORT.FINAL DX SPEC: NORMAL
PATH REPORT.GROSS SPEC: NORMAL

## 2025-01-17 NOTE — OP NOTE
Subjective     Date of Service:   1/16/2025  Time of Service:   2 PM    Surgical Staff: Surgeons and Role:     * Ric Collins MD - Primary   Additional Staff: None   Pre-operative diagnosis(es): Pre-Op Diagnosis Codes:      * Menorrhagia with irregular cycle [N92.1]     Post-operative diagnosis(es): Post-Op Diagnosis Codes:     * Menorrhagia with irregular cycle [N92.1]   Procedure(s): Procedure(s):  DILATATION AND CURETTAGE HYSTEROSCOPY NOVASURE ENDOMETRIAL ABLATION     Antibiotics: None indicated      Anesthesia: Type: Choice  ASA:  III     Objective      Operative findings: Normal-appearing intrauterine cavity and endocervical canal.  Uterus measured 4 cm in depth and 2.5 cm in width.  Normal tubal ostia.  No intrauterine masses seen.   Specimens removed: ID Type Source Tests Collected by Time   A : endometrial curettings Tissue Endometrial Curettings TISSUE PATHOLOGY EXAM Ric Collins MD 1/16/2025 1408      Fluid Intake: .Less than 5 mL   Output: Documented Output  Est. Blood Loss less than 5 mL  Urine Output .mL  Other Output .mL  NG/OG Output .mL    I/O this shift:  In: 250 [I.V.:250]  Out: 5 [Blood:5]     Blood products used: No   Drains: * No LDAs found *   Implant Information: Nothing was implanted during the procedure   Complications: None   Intraoperative  procedure: Patient is taken operating room and placed in low lithotomy position.  Adequate level of anesthetic is administered.  She is prepped and draped the bladder is drained.  Cervix is grasped after a timeout is performed.  Cervix was then dilated to a size 16 dilator.  We did a brief hysteroscopy using normal saline as a distending medium with the above findings.  I did a sharp curette for tissue diagnosis purposes.  We then opened the NovaSure device and obtain the above measurements.  The seal test was carried out and passed and then heat was carried out for 1 minute and 22 seconds.  We removed the instruments and she was later  awakened and transferred stable condition to recovery room.   Condition: stable   Disposition: to PACU and then admit to   home         Assessment & Plan     Status post endometrial sampling with hysteroscopy and NovaSure endometrial ablation.              Ric Collins MD  01/17/25  10:11 EST

## 2025-01-31 ENCOUNTER — OFFICE VISIT (OUTPATIENT)
Dept: FAMILY MEDICINE CLINIC | Facility: CLINIC | Age: 35
End: 2025-01-31
Payer: COMMERCIAL

## 2025-01-31 VITALS
BODY MASS INDEX: 43.2 KG/M2 | HEIGHT: 66 IN | OXYGEN SATURATION: 97 % | TEMPERATURE: 98.4 F | HEART RATE: 116 BPM | SYSTOLIC BLOOD PRESSURE: 124 MMHG | DIASTOLIC BLOOD PRESSURE: 84 MMHG | WEIGHT: 268.8 LBS

## 2025-01-31 DIAGNOSIS — Z13.220 ENCOUNTER FOR LIPID SCREENING FOR CARDIOVASCULAR DISEASE: ICD-10-CM

## 2025-01-31 DIAGNOSIS — Z00.00 ENCOUNTER FOR ANNUAL PHYSICAL EXAM: Primary | ICD-10-CM

## 2025-01-31 DIAGNOSIS — R11.0 NAUSEA: ICD-10-CM

## 2025-01-31 DIAGNOSIS — F41.0 PANIC ATTACK: ICD-10-CM

## 2025-01-31 DIAGNOSIS — R79.89 ELEVATED SERUM FREE T4 LEVEL: ICD-10-CM

## 2025-01-31 DIAGNOSIS — Z13.1 ENCOUNTER FOR SCREENING FOR DIABETES MELLITUS: ICD-10-CM

## 2025-01-31 DIAGNOSIS — E55.9 VITAMIN D DEFICIENCY: ICD-10-CM

## 2025-01-31 DIAGNOSIS — M51.369 DEGENERATION OF INTERVERTEBRAL DISC OF LUMBAR REGION, UNSPECIFIED WHETHER PAIN PRESENT: ICD-10-CM

## 2025-01-31 DIAGNOSIS — F41.9 ANXIETY: ICD-10-CM

## 2025-01-31 DIAGNOSIS — K21.9 GASTROESOPHAGEAL REFLUX DISEASE WITHOUT ESOPHAGITIS: ICD-10-CM

## 2025-01-31 DIAGNOSIS — Z13.6 ENCOUNTER FOR LIPID SCREENING FOR CARDIOVASCULAR DISEASE: ICD-10-CM

## 2025-01-31 PROBLEM — K80.20 SYMPTOMATIC CHOLELITHIASIS: Status: ACTIVE | Noted: 2018-04-24

## 2025-01-31 PROBLEM — K25.9 GASTRIC ULCER: Status: ACTIVE | Noted: 2017-02-07

## 2025-01-31 PROBLEM — M54.50 ACUTE EXACERBATION OF CHRONIC LOW BACK PAIN: Status: ACTIVE | Noted: 2019-10-04

## 2025-01-31 PROBLEM — E03.9 HYPOTHYROIDISM: Status: ACTIVE | Noted: 2017-02-07

## 2025-01-31 PROBLEM — Z30.2 ADMISSION FOR STERILIZATION: Status: ACTIVE | Noted: 2019-03-07

## 2025-01-31 PROBLEM — Z83.49 FAMILY HISTORY OF THYROID DISEASE IN MOTHER: Status: ACTIVE | Noted: 2021-12-31

## 2025-01-31 PROBLEM — F32.9 MAJOR DEPRESSIVE DISORDER: Status: ACTIVE | Noted: 2025-01-31

## 2025-01-31 PROBLEM — F31.9 BIPOLAR DISORDER: Status: ACTIVE | Noted: 2017-02-07

## 2025-01-31 PROBLEM — F43.22 ADJUSTMENT DISORDER WITH ANXIOUS MOOD: Status: ACTIVE | Noted: 2025-01-31

## 2025-01-31 PROBLEM — F33.1 RECURRENT MAJOR DEPRESSIVE EPISODES, MODERATE: Status: ACTIVE | Noted: 2019-01-07

## 2025-01-31 PROBLEM — J18.9 PNEUMONIA: Status: ACTIVE | Noted: 2021-12-30

## 2025-01-31 PROBLEM — G89.29 ACUTE EXACERBATION OF CHRONIC LOW BACK PAIN: Status: ACTIVE | Noted: 2019-10-04

## 2025-01-31 PROBLEM — R10.2 PELVIC PAIN IN FEMALE: Status: ACTIVE | Noted: 2017-04-27

## 2025-01-31 PROBLEM — K81.9 CHOLECYSTITIS: Status: ACTIVE | Noted: 2018-04-27

## 2025-01-31 PROBLEM — Z87.01 HISTORY OF PNEUMONIA: Status: ACTIVE | Noted: 2021-12-31

## 2025-01-31 RX ORDER — ONDANSETRON 4 MG/1
4 TABLET, ORALLY DISINTEGRATING ORAL 4 TIMES DAILY PRN
Qty: 30 TABLET | Refills: 0 | Status: SHIPPED | OUTPATIENT
Start: 2025-01-31

## 2025-01-31 NOTE — PROGRESS NOTES
Chief Complaint  Establish Care    Subjective        Alejo Pascal presents to [facility] to establish care and annual exam.    History of Present Illness    Previous PCP anisha denise, she relocated to [place].       01/16/25; patient had a   DILATATION AND CURETTAGE HYSTEROSCOPY NOVASURE ENDOMETRIAL ABLATION (Vagina)   With dr yue pereira; tolerated well.          DDD  MRI L spine: 8/1/23  MRI L spine: 7/23/24--ordered per [facility] reviewed  1. Mild lumbar spondylosis at L5-S1.  2. No fracture or disc herniation  Follows pain management for injections.   Injection si joint with activa Monday (pain management).   Wants to do PT .  Off work from [employer]; 1 year or little over.   Needs papers done;   Off work completely; start date 02/03/25-02/03/26  Unable to lift, walk, long distances and [employer] will not accommodate.  Following neurosurgery 7/24/24--not a candidate for surgery         Anxiety/panic attacks  Chronic, ongoing, fairly controlled.  Current medication regimen Clonidine and seroquel.  Was seeing [facility]; missed 2 appts and was dismissed from practice.  Ativan worked better for her than clonidine for panic attacks.  Denies SI/HI.   Denies past history self harm.        BMI 43.39  Was On wegovy to help with weight loss, had to stop for few weeks prior to surgery.  Weighs herself and down a total of 20 lbs   Is Restarting again.  Starting 0.5mg this Monday.  Does have some mild nausea/constipation with it but it resolves.       Gerd  Chronic, ongoing, well controlled.  Current medication regimen prilosec.            Past Medical History:   Diagnosis Date    Anxiety     Asthma     Depression     GERD (gastroesophageal reflux disease)     Heavy menstrual bleeding     Kidney stone     Panic attack     Urinary tract infection    PMH: anxiety/depression, bipolar disorder, lung nodule, covid19, frequenty bronchitis, cigarette smoker, chronic low back pain      Past Surgical History:   Procedure Laterality Date    CHOLECYSTECTOMY      D & C HYSTEROSCOPY ENDOMETRIAL ABLATION N/A 1/16/2025    Procedure: DILATATION AND CURETTAGE HYSTEROSCOPY NOVASURE ENDOMETRIAL ABLATION;  Surgeon: Ric Collins MD;  Location: [facility] OR;  Service: Obstetrics/Gynecology;  Laterality: N/A;    HERNIA REPAIR      AS A BABY    TRIGGER FINGER RELEASE Right     TUBAL ABDOMINAL LIGATION          Family History   Problem Relation Age of Onset    Thyroid disease Mother     COPD Father     Cancer Maternal Grandfather     Cancer Maternal Grandmother     Cancer Maternal Aunt     Malig Hyperthermia Neg Hx    Graves; mother  Grandfather (pancreatic) /grandmother (pancreatic/breast cancer diagnosed in 50s)   Aunt; breast cancer; 40s   Patient was Checked for the gene; negative        PHQ-2 Depression Screening  Little interest or pleasure in doing things? Not at all   Feeling down, depressed, or hopeless? Not at all   PHQ-2 Total Score 0         Social History     Socioeconomic History    Marital status: Single   Tobacco Use    Smoking status: Every Day     Current packs/day: 1.50     Average packs/day: 1.5 packs/day for 15.0 years (22.5 ttl pk-yrs)     Types: Cigarettes    Smokeless tobacco: Never   Vaping Use    Vaping status: Never Used   Substance and Sexual Activity    Alcohol use: Yes     Alcohol/week: 4.0 standard drinks of alcohol     Types: 4 Drinks containing 0.5 oz of alcohol per week     Comment: TWICE A WEEK    Drug use: Never     Frequency: 1.0 times per week     Comment: previously used Ecstacy once a week, until 1 year ago    Sexual activity: Yes     Partners: Male     Birth control/protection: Tubal ligation         Social history:  Household members: 8  Marital status:  signifcant other and they have been together for 10 years.  Work status: off work at the time for back; [employer]  Tobacco use: yes; 1.5 packs a day,  for 15 years  Alcohol use: social  Illicit drugs:  "denies  Reproductive health: She is sexually active and practices safe sex.         Health Maintenance  Dentist: last dental exam was years ago.  Vision: denies contacts or glasses, last eye exam was years ago, she is due.  Flu denies  Covid denies  Annual today  Pap: due; last one maybe 6 years ago; abnormal ones in the past, sees dr pereira                                      Objective   Vital Signs:  /84 (BP Location: Left arm, Patient Position: Sitting, Cuff Size: Adult)   Pulse 116   Temp 98.4 °F (36.9 °C)   Ht 167.6 cm (66\")   Wt 122 kg (268 lb 12.8 oz)   SpO2 97%   BMI 43.39 kg/m²   Estimated body mass index is 43.39 kg/m² as calculated from the following:    Height as of this encounter: 167.6 cm (66\").    Weight as of this encounter: 122 kg (268 lb 12.8 oz).            Physical Exam  Vitals reviewed.   Constitutional:       General: She is not in acute distress.     Appearance: She is obese.   HENT:      Head: Normocephalic and atraumatic.      Right Ear: Tympanic membrane normal.      Left Ear: Tympanic membrane normal.      Nose: Nose normal. No congestion.      Mouth/Throat:      Mouth: Mucous membranes are moist.      Pharynx: No oropharyngeal exudate or posterior oropharyngeal erythema.   Eyes:      Conjunctiva/sclera: Conjunctivae normal.      Pupils: Pupils are equal, round, and reactive to light.   Cardiovascular:      Rate and Rhythm: Normal rate and regular rhythm.      Pulses: Normal pulses.      Heart sounds: No murmur heard.     No gallop.   Pulmonary:      Effort: Pulmonary effort is normal. No respiratory distress.      Breath sounds: Normal breath sounds. No wheezing.   Abdominal:      General: Bowel sounds are normal. There is no distension.      Palpations: Abdomen is soft.      Tenderness: There is no abdominal tenderness.   Musculoskeletal:         General: Normal range of motion.      Cervical back: Normal range of motion and neck supple. No tenderness.   Skin:     General: " Skin is warm and dry.   Neurological:      Mental Status: She is alert and oriented to person, place, and time. Mental status is at baseline.   Psychiatric:         Mood and Affect: Mood normal.        Result Review :                Assessment and Plan   Diagnoses and all orders for this visit:    1. Encounter for annual physical exam (Primary)    2. Encounter for lipid screening for cardiovascular disease  -     Lipid Panel    3. Elevated serum free T4 level  -     TSH Rfx On Abnormal To Free T4    4. Vitamin D deficiency  -     Vitamin D,25-Hydroxy    5. Encounter for screening for diabetes mellitus  -     Hemoglobin A1c    6. Panic attack    7. Anxiety  -     Ambulatory Referral to Psychiatry  -     Ambulatory Referral to Behavioral Health    8. Degeneration of intervertebral disc of lumbar region, unspecified whether pain present  -     Ambulatory Referral to Physical Therapy for Evaluation & Treatment    9. Gastroesophageal reflux disease without esophagitis    10. Nausea  -     ondansetron ODT (ZOFRAN-ODT) 4 MG disintegrating tablet; Place 1 tablet on the tongue 4 (Four) Times a Day As Needed for Nausea or Vomiting.  Dispense: 30 tablet; Refill: 0    Will follow up with lab orders.  Continue current medication regimens.  F/u in 4 weeks for a weight check.   Encourage healthy diet and exercise.  Encourage patient to stay up to date on screening examinations as indicated based on age and risk factors.       Follow Up   Return in about 4 weeks (around 2/28/2025) for Recheck, Video visit.  Patient was given instructions and counseling regarding her condition or for health maintenance advice. Please see specific information pulled into the AVS if appropriate.

## 2025-02-01 PROBLEM — R79.89 ELEVATED SERUM FREE T4 LEVEL: Status: ACTIVE | Noted: 2025-02-01

## 2025-02-01 PROBLEM — E55.9 VITAMIN D DEFICIENCY: Status: ACTIVE | Noted: 2025-02-01

## 2025-02-01 PROBLEM — Z13.220 ENCOUNTER FOR LIPID SCREENING FOR CARDIOVASCULAR DISEASE: Status: ACTIVE | Noted: 2025-02-01

## 2025-02-01 PROBLEM — Z13.1 ENCOUNTER FOR SCREENING FOR DIABETES MELLITUS: Status: ACTIVE | Noted: 2025-02-01

## 2025-02-01 PROBLEM — Z13.6 ENCOUNTER FOR LIPID SCREENING FOR CARDIOVASCULAR DISEASE: Status: ACTIVE | Noted: 2025-02-01

## 2025-02-01 PROBLEM — Z00.00 ENCOUNTER FOR ANNUAL PHYSICAL EXAM: Status: ACTIVE | Noted: 2025-02-01

## 2025-02-01 LAB
25(OH)D3+25(OH)D2 SERPL-MCNC: 9.9 NG/ML (ref 30–100)
CHOLEST SERPL-MCNC: 231 MG/DL (ref 100–199)
HBA1C MFR BLD: 5.3 % (ref 4.8–5.6)
HDLC SERPL-MCNC: 58 MG/DL
LDLC SERPL CALC-MCNC: 121 MG/DL (ref 0–99)
TRIGL SERPL-MCNC: 296 MG/DL (ref 0–149)
TSH SERPL DL<=0.005 MIU/L-ACNC: 3.25 UIU/ML (ref 0.45–4.5)
VLDLC SERPL CALC-MCNC: 52 MG/DL (ref 5–40)

## 2025-02-03 ENCOUNTER — TELEPHONE (OUTPATIENT)
Dept: FAMILY MEDICINE CLINIC | Facility: CLINIC | Age: 35
End: 2025-02-03
Payer: COMMERCIAL

## 2025-02-03 NOTE — TELEPHONE ENCOUNTER
Caller: Alejo Pascal    Relationship: Self    Best call back number: [phone]      What form or medical record are you requesting: DISABILITY PAPERWORK     Who is requesting this form or medical record from you: EBER     How would you like to receive the form or medical records (pick-up, mail, fax): FAX    Timeframe paperwork needed: ASAP    Additional notes:PLEASE ADVISE PATIENT WHEN THIS HAS BEEN ADDRESSED.

## 2025-02-06 ENCOUNTER — PATIENT ROUNDING (BHMG ONLY) (OUTPATIENT)
Dept: FAMILY MEDICINE CLINIC | Facility: CLINIC | Age: 35
End: 2025-02-06
Payer: COMMERCIAL

## 2025-02-06 DIAGNOSIS — E55.9 VITAMIN D DEFICIENCY: ICD-10-CM

## 2025-02-06 DIAGNOSIS — E78.2 MIXED HYPERLIPIDEMIA: Primary | ICD-10-CM

## 2025-02-06 RX ORDER — ATORVASTATIN CALCIUM 10 MG/1
10 TABLET, FILM COATED ORAL DAILY
Qty: 90 TABLET | Refills: 2 | Status: SHIPPED | OUTPATIENT
Start: 2025-02-06 | End: 2025-11-03

## 2025-02-06 RX ORDER — ERGOCALCIFEROL 1.25 MG/1
50000 CAPSULE, LIQUID FILLED ORAL WEEKLY
Qty: 12 CAPSULE | Refills: 0 | Status: SHIPPED | OUTPATIENT
Start: 2025-02-06 | End: 2025-05-01

## 2025-02-06 NOTE — PROGRESS NOTES
A My-Chart message has been sent to the patient for PATIENT ROUNDING with [facility]

## 2025-02-28 ENCOUNTER — TELEPHONE (OUTPATIENT)
Dept: FAMILY MEDICINE CLINIC | Facility: CLINIC | Age: 35
End: 2025-02-28

## 2025-02-28 ENCOUNTER — TELEMEDICINE (OUTPATIENT)
Dept: FAMILY MEDICINE CLINIC | Facility: CLINIC | Age: 35
End: 2025-02-28
Payer: COMMERCIAL

## 2025-02-28 VITALS — HEIGHT: 66 IN | BODY MASS INDEX: 43.55 KG/M2 | WEIGHT: 271 LBS

## 2025-02-28 DIAGNOSIS — M51.369 DEGENERATION OF INTERVERTEBRAL DISC OF LUMBAR REGION, UNSPECIFIED WHETHER PAIN PRESENT: ICD-10-CM

## 2025-02-28 DIAGNOSIS — F41.1 GENERALIZED ANXIETY DISORDER: ICD-10-CM

## 2025-02-28 DIAGNOSIS — F51.01 PRIMARY INSOMNIA: ICD-10-CM

## 2025-02-28 DIAGNOSIS — F41.0 PANIC ATTACK: ICD-10-CM

## 2025-02-28 DIAGNOSIS — E66.01 OBESITY, MORBID (MORE THAN 100 LBS OVER IDEAL WEIGHT OR BMI > 40): Primary | ICD-10-CM

## 2025-02-28 DIAGNOSIS — F33.1 RECURRENT MAJOR DEPRESSIVE EPISODES, MODERATE: ICD-10-CM

## 2025-02-28 RX ORDER — QUETIAPINE FUMARATE 300 MG/1
300 TABLET, FILM COATED ORAL NIGHTLY
Qty: 90 TABLET | Refills: 0 | Status: SHIPPED | OUTPATIENT
Start: 2025-02-28

## 2025-02-28 NOTE — PROGRESS NOTES
Subjective          History of Present Illness              Patient location: home address as in chart  Physician location: [place] [id number]   Start time: 1151  End time: 1200  Total time of encounter: 9 mins    You have chosen to receive care through a telephone visit. Do you consent to use a telephone visit for your medical care today? Yes      BMI 43.76  Weight today 271 lbs  Reports wegovy making her sick.  Used to tolerate okay and would be only sick for a few days but this time she is sick for 1 week.  Stomach pain, nausea, constipation.  Took miralax  and zofran and that helped some of the symptoms.  Requesting to try zepound.  Discussed risks and benefits with patient. Different treatment plans.  Discussed lifestyle modifications, patient agreeable to change diet and increase activity  Denies history of thyroid tumors, pancreatitis, denies personal or family history of medullary thyroid carcinoma or multiple endocrine neoplasia syndrome type II.  Discussed most common side effects are still, nausea, vomiting, and severe constipation.  Wt Readings from Last 3 Encounters:   02/28/25 123 kg (271 lb)   01/31/25 122 kg (268 lb 12.8 oz)   01/09/25 123 kg (272 lb)               DDD  Started PT.  Reports overall she is Doing well with it.  MRI L spine: 8/1/23  MRI L spine: 7/23/24--ordered per [facility] reviewed  1. Mild lumbar spondylosis at L5-S1.  2. No fracture or disc herniation  Follows pain management for injections.   Injection si joint with activa Monday (pain management).   Wants to do PT .  Off work from [employer]; 1 year or little over.   Unable to lift, walk, long distances and [employer] will not accommodate.  Following neurosurgery 7/24/24--not a candidate for surgery    Anxiety/panic attacks/insomnia  Chronic, ongoing, fairly controlled.  Current medication regimen Clonidine and seroquel.  Denies SI/HI.   Denies past history self harm.  Patient requesting to increase seroquel not helping insomnia  "anymore.  Trying to get into psych and therapy.  Was kicked out of [facility], trying to get them to let her come back.        Objective   Vital Signs:  Ht 167.6 cm (65.98\")   Wt 123 kg (271 lb) Comment: per patient scale at home  BMI 43.76 kg/m²   Estimated body mass index is 43.76 kg/m² as calculated from the following:    Height as of this encounter: 167.6 cm (65.98\").    Weight as of this encounter: 123 kg (271 lb).            Physical Exam  Constitutional:       General: She is not in acute distress.     Appearance: Normal appearance. She is obese.   Pulmonary:      Effort: No respiratory distress.   Neurological:      Mental Status: She is oriented to person, place, and time. Mental status is at baseline.   Psychiatric:         Mood and Affect: Mood normal.         Behavior: Behavior normal.        Result Review :  The following data was reviewed by: Diane Holguin APRN on 02/28/2025:              Assessment and Plan   Diagnoses and all orders for this visit:    1. Obesity, morbid (more than 100 lbs over ideal weight or BMI > 40) (Primary)  -     Tirzepatide-Weight Management (ZEPBOUND) 2.5 MG/0.5ML solution; Inject 0.5 mL under the skin into the appropriate area as directed 1 (One) Time Per Week for 30 days.  Dispense: 2 mL; Refill: 0    2. Panic attack    3. Recurrent major depressive episodes, moderate    4. Generalized anxiety disorder    5. Degeneration of intervertebral disc of lumbar region, unspecified whether pain present    6. Primary insomnia  -     QUEtiapine (SEROquel) 300 MG tablet; Take 1 tablet by mouth Every Night.  Dispense: 90 tablet; Refill: 0    Discontinue Wegovy.  New prescription for Zepbound sent to pharmacy, may require prior authorization.  Discussed that Zepbound may have similar side effects.  Continue MiraLAX for constipation, may use Zofran as needed for nausea.  Seroquel increased today from 200 mg to 300 mg.  Patient is trying to get back into " [facility] for psych and therapy, if facility does not take patient back, contact office and I will place new referrals.  Follow-up in 4 weeks from starting Zepbound for weight check, insomnia.  May do telemedicine.       Follow Up   Return in about 4 weeks (around 3/28/2025) for 4 weeks from starting zepbound, weight check, insomnia. may do telemedicine.  Patient was given instructions and counseling regarding her condition or for health maintenance advice. Please see specific information pulled into the AVS if appropriate.       I spent 20 minutes caring for Alejo on this date of service. This time includes time spent by me in the following activities: preparing for the visit, performing a medically appropriate examination and/or evaluation, counseling and educating the patient/family/caregiver, documenting information in the medical record, and ordering medications  Mode of Visit: Video  Location of patient: -HOME-  Location of provider: [facility]  You have chosen to receive care through a telehealth visit.  The patient has signed the video visit consent form.  The visit included audio and video interaction. No technical issues occurred during this visit.

## 2025-05-08 ENCOUNTER — OFFICE VISIT (OUTPATIENT)
Dept: FAMILY MEDICINE CLINIC | Facility: CLINIC | Age: 35
End: 2025-05-08
Payer: COMMERCIAL

## 2025-05-08 VITALS
BODY MASS INDEX: 44.77 KG/M2 | TEMPERATURE: 97 F | HEART RATE: 80 BPM | HEIGHT: 66 IN | SYSTOLIC BLOOD PRESSURE: 126 MMHG | DIASTOLIC BLOOD PRESSURE: 88 MMHG | WEIGHT: 278.6 LBS | OXYGEN SATURATION: 98 %

## 2025-05-08 DIAGNOSIS — F33.1 RECURRENT MAJOR DEPRESSIVE EPISODES, MODERATE: ICD-10-CM

## 2025-05-08 DIAGNOSIS — E78.2 MIXED HYPERLIPIDEMIA: Primary | ICD-10-CM

## 2025-05-08 DIAGNOSIS — F51.01 PRIMARY INSOMNIA: ICD-10-CM

## 2025-05-08 DIAGNOSIS — F41.0 PANIC ATTACK: ICD-10-CM

## 2025-05-08 DIAGNOSIS — J30.2 SEASONAL ALLERGIES: ICD-10-CM

## 2025-05-08 DIAGNOSIS — F41.1 GENERALIZED ANXIETY DISORDER: ICD-10-CM

## 2025-05-08 DIAGNOSIS — E55.9 VITAMIN D DEFICIENCY: ICD-10-CM

## 2025-05-08 DIAGNOSIS — M51.369 DEGENERATION OF INTERVERTEBRAL DISC OF LUMBAR REGION, UNSPECIFIED WHETHER PAIN PRESENT: ICD-10-CM

## 2025-05-08 DIAGNOSIS — E66.01 OBESITY, MORBID (MORE THAN 100 LBS OVER IDEAL WEIGHT OR BMI > 40): ICD-10-CM

## 2025-05-08 RX ORDER — TRAMADOL HYDROCHLORIDE 50 MG/1
TABLET ORAL
COMMUNITY
Start: 2025-04-08

## 2025-05-08 RX ORDER — LORATADINE 10 MG/1
10 TABLET ORAL DAILY
Qty: 90 TABLET | Refills: 3 | Status: SHIPPED | OUTPATIENT
Start: 2025-05-08

## 2025-05-08 NOTE — PROGRESS NOTES
Chief Complaint  discuss weight loss    Subjective          History of Present Illness      BMI 44.99  Weight today 278 lbs  Zepbound; not approved on insurance.  Wegovy was making her sick, discontinued.  Does not want to restart at this time.  Has brought up the packet for bariatric clinic, waiting to hear back from them.  Very interested in having weight loss surgery.    Wt Readings from Last 3 Encounters:   05/08/25 126 kg (278 lb 9.6 oz)   02/28/25 123 kg (271 lb)   01/31/25 122 kg (268 lb 12.8 oz)             Hld  Started cholesterol medicine in January.  Denies any negative side effects.  Lab Results   Component Value Date    CHLPL 231 (H) 01/31/2025    TRIG 296 (H) 01/31/2025    HDL 58 01/31/2025     (H) 01/31/2025           BP Readings from Last 3 Encounters:   05/08/25 126/88   01/31/25 124/84   01/16/25 116/86         Allergies  Patient suffers from seasonal allergies, requesting medication.    Tolerates Claritin the best.  Has albuterol inhaler as needed          DDD  Started PT and pain management.  Pain management has her on tramadol.  Reports overall she is Doing well with it.  MRI L spine: 8/1/23  MRI L spine: 7/23/24--ordered per [facility] reviewed  1. Mild lumbar spondylosis at L5-S1.  2. No fracture or disc herniation  Follows pain management for injections.   Injection si joint with activa Monday (pain management).   Wants to do PT .  Off work from [employer]; 1 year or little over.   Unable to lift, walk, long distances and [employer] will not accommodate.  Following neurosurgery 7/24/24--not a candidate for surgery     Anxiety/panic attacks/insomnia  Currently seeing psych.  They started her on Ativan ; pain management said can't be tramadol and Ativan.  Patient discontinued Ativan.  Has a new appointment next week.    Current medication regimen  seroquel.  Denies SI/HI.   Denies past history self harm.       Objective   Vital Signs:  /88   Pulse 80   Temp 97 °F (36.1 °C) (Temporal)   Ht  "167.6 cm (65.98\")   Wt 126 kg (278 lb 9.6 oz)   SpO2 98%   BMI 44.99 kg/m²   Estimated body mass index is 44.99 kg/m² as calculated from the following:    Height as of this encounter: 167.6 cm (65.98\").    Weight as of this encounter: 126 kg (278 lb 9.6 oz).            Physical Exam  Vitals reviewed.   Constitutional:       General: She is not in acute distress.     Appearance: She is obese.   HENT:      Head: Normocephalic and atraumatic.      Nose: Nose normal. No congestion.      Mouth/Throat:      Mouth: Mucous membranes are moist.      Pharynx: No oropharyngeal exudate or posterior oropharyngeal erythema.   Eyes:      Conjunctiva/sclera: Conjunctivae normal.      Pupils: Pupils are equal, round, and reactive to light.   Cardiovascular:      Rate and Rhythm: Normal rate and regular rhythm.      Pulses: Normal pulses.      Heart sounds: No murmur heard.     No gallop.   Pulmonary:      Effort: Pulmonary effort is normal. No respiratory distress.      Breath sounds: Normal breath sounds. No wheezing.   Abdominal:      General: Bowel sounds are normal. There is no distension.      Palpations: Abdomen is soft.      Tenderness: There is no abdominal tenderness.   Musculoskeletal:         General: Normal range of motion.      Cervical back: Normal range of motion and neck supple. No tenderness.   Skin:     General: Skin is warm and dry.   Neurological:      Mental Status: She is alert and oriented to person, place, and time. Mental status is at baseline.   Psychiatric:         Mood and Affect: Mood normal.        Result Review :                Assessment and Plan   Diagnoses and all orders for this visit:    1. Mixed hyperlipidemia (Primary)  -     Comprehensive Metabolic Panel  -     Lipid Panel    2. Vitamin D deficiency  -     Vitamin D,25-Hydroxy    3. Seasonal allergies  -     loratadine (Claritin) 10 MG tablet; Take 1 tablet by mouth Daily.  Dispense: 90 tablet; Refill: 3    4. Obesity, morbid (more than 100 " lbs over ideal weight or BMI > 40)    5. Primary insomnia    6. Degeneration of intervertebral disc of lumbar region, unspecified whether pain present    7. Recurrent major depressive episodes, moderate    8. Panic attack    9. Generalized anxiety disorder      Follow-up with psych, pain management, physical therapy as scheduled.  Try to get appointment set up with bariatric clinic.  Will follow-up with lab results.      Follow Up   Return in about 6 months (around 11/8/2025) for Recheck.  Patient was given instructions and counseling regarding her condition or for health maintenance advice. Please see specific information pulled into the AVS if appropriate.

## 2025-05-09 DIAGNOSIS — E55.9 VITAMIN D DEFICIENCY: ICD-10-CM

## 2025-05-09 LAB
25(OH)D3+25(OH)D2 SERPL-MCNC: 25.1 NG/ML (ref 30–100)
ALBUMIN SERPL-MCNC: 4 G/DL (ref 3.5–5.2)
ALBUMIN/GLOB SERPL: 1.3 G/DL
ALP SERPL-CCNC: 85 U/L (ref 39–117)
ALT SERPL-CCNC: 29 U/L (ref 1–33)
AST SERPL-CCNC: 27 U/L (ref 1–32)
BILIRUB SERPL-MCNC: 0.3 MG/DL (ref 0–1.2)
BUN SERPL-MCNC: 12 MG/DL (ref 6–20)
BUN/CREAT SERPL: 15.8 (ref 7–25)
CALCIUM SERPL-MCNC: 9.4 MG/DL (ref 8.6–10.5)
CHLORIDE SERPL-SCNC: 102 MMOL/L (ref 98–107)
CHOLEST SERPL-MCNC: 160 MG/DL (ref 0–200)
CO2 SERPL-SCNC: 25.5 MMOL/L (ref 22–29)
CREAT SERPL-MCNC: 0.76 MG/DL (ref 0.57–1)
EGFRCR SERPLBLD CKD-EPI 2021: 104.9 ML/MIN/1.73
GLOBULIN SER CALC-MCNC: 3.1 GM/DL
GLUCOSE SERPL-MCNC: 105 MG/DL (ref 65–99)
HDLC SERPL-MCNC: 52 MG/DL (ref 40–60)
LDLC SERPL CALC-MCNC: 81 MG/DL (ref 0–100)
POTASSIUM SERPL-SCNC: 4.2 MMOL/L (ref 3.5–5.2)
PROT SERPL-MCNC: 7.1 G/DL (ref 6–8.5)
SODIUM SERPL-SCNC: 139 MMOL/L (ref 136–145)
TRIGL SERPL-MCNC: 156 MG/DL (ref 0–150)
VLDLC SERPL CALC-MCNC: 27 MG/DL (ref 5–40)

## 2025-05-09 RX ORDER — ERGOCALCIFEROL 1.25 MG/1
50000 CAPSULE, LIQUID FILLED ORAL WEEKLY
Qty: 12 CAPSULE | Refills: 3 | Status: SHIPPED | OUTPATIENT
Start: 2025-05-09 | End: 2025-05-12 | Stop reason: SDUPTHER

## 2025-05-12 ENCOUNTER — RESULTS FOLLOW-UP (OUTPATIENT)
Dept: FAMILY MEDICINE CLINIC | Facility: CLINIC | Age: 35
End: 2025-05-12
Payer: COMMERCIAL

## 2025-05-12 DIAGNOSIS — E55.9 VITAMIN D DEFICIENCY: ICD-10-CM

## 2025-05-12 DIAGNOSIS — E78.2 MIXED HYPERLIPIDEMIA: Primary | ICD-10-CM

## 2025-05-12 RX ORDER — ERGOCALCIFEROL 1.25 MG/1
50000 CAPSULE, LIQUID FILLED ORAL WEEKLY
Qty: 12 CAPSULE | Refills: 3 | Status: SHIPPED | OUTPATIENT
Start: 2025-05-12

## 2025-05-12 RX ORDER — ATORVASTATIN CALCIUM 20 MG/1
20 TABLET, FILM COATED ORAL DAILY
Qty: 90 TABLET | Refills: 3 | Status: SHIPPED | OUTPATIENT
Start: 2025-05-12 | End: 2026-05-07

## 2025-05-30 PROBLEM — F41.9 ANXIETY: Status: RESOLVED | Noted: 2017-02-07 | Resolved: 2025-05-30

## 2025-05-30 PROBLEM — Z13.6 ENCOUNTER FOR LIPID SCREENING FOR CARDIOVASCULAR DISEASE: Status: RESOLVED | Noted: 2025-02-01 | Resolved: 2025-05-30

## 2025-05-30 PROBLEM — Z13.220 ENCOUNTER FOR LIPID SCREENING FOR CARDIOVASCULAR DISEASE: Status: RESOLVED | Noted: 2025-02-01 | Resolved: 2025-05-30

## 2025-05-30 PROBLEM — Z13.1 ENCOUNTER FOR SCREENING FOR DIABETES MELLITUS: Status: RESOLVED | Noted: 2025-02-01 | Resolved: 2025-05-30

## 2025-05-30 RX ORDER — LORAZEPAM 0.5 MG/1
0.5 TABLET ORAL 2 TIMES DAILY PRN
COMMUNITY
End: 2025-06-05

## 2025-05-30 RX ORDER — PROPRANOLOL HCL 20 MG
20 TABLET ORAL DAILY
COMMUNITY

## 2025-06-03 ENCOUNTER — CONSULT (OUTPATIENT)
Dept: BARIATRICS/WEIGHT MGMT | Facility: CLINIC | Age: 35
End: 2025-06-03
Payer: COMMERCIAL

## 2025-06-03 ENCOUNTER — PREP FOR SURGERY (OUTPATIENT)
Dept: OTHER | Facility: HOSPITAL | Age: 35
End: 2025-06-03
Payer: COMMERCIAL

## 2025-06-03 VITALS
WEIGHT: 285 LBS | DIASTOLIC BLOOD PRESSURE: 75 MMHG | SYSTOLIC BLOOD PRESSURE: 120 MMHG | TEMPERATURE: 98.1 F | BODY MASS INDEX: 47.48 KG/M2 | HEIGHT: 65 IN | HEART RATE: 78 BPM

## 2025-06-03 DIAGNOSIS — F51.01 PRIMARY INSOMNIA: ICD-10-CM

## 2025-06-03 DIAGNOSIS — Z01.818 PRE-OP EVALUATION: ICD-10-CM

## 2025-06-03 DIAGNOSIS — F32.0 CURRENT MILD EPISODE OF MAJOR DEPRESSIVE DISORDER, UNSPECIFIED WHETHER RECURRENT: ICD-10-CM

## 2025-06-03 DIAGNOSIS — E66.01 MORBID OBESITY WITH BMI OF 45.0-49.9, ADULT: Primary | ICD-10-CM

## 2025-06-03 DIAGNOSIS — R06.81 WITNESSED EPISODE OF APNEA: ICD-10-CM

## 2025-06-03 DIAGNOSIS — G89.29 ACUTE EXACERBATION OF CHRONIC LOW BACK PAIN: ICD-10-CM

## 2025-06-03 DIAGNOSIS — K21.9 GASTROESOPHAGEAL REFLUX DISEASE, UNSPECIFIED WHETHER ESOPHAGITIS PRESENT: ICD-10-CM

## 2025-06-03 DIAGNOSIS — E78.2 MIXED HYPERLIPIDEMIA: ICD-10-CM

## 2025-06-03 DIAGNOSIS — Z87.11 H/O GASTRIC ULCER: ICD-10-CM

## 2025-06-03 DIAGNOSIS — F41.0 PANIC ATTACK: ICD-10-CM

## 2025-06-03 DIAGNOSIS — J45.20 MILD INTERMITTENT ASTHMA WITHOUT COMPLICATION: ICD-10-CM

## 2025-06-03 DIAGNOSIS — R06.83 SNORING: ICD-10-CM

## 2025-06-03 DIAGNOSIS — M54.50 ACUTE EXACERBATION OF CHRONIC LOW BACK PAIN: ICD-10-CM

## 2025-06-03 DIAGNOSIS — F41.1 GENERALIZED ANXIETY DISORDER: ICD-10-CM

## 2025-06-03 DIAGNOSIS — Z01.818 PREOPERATIVE TESTING: ICD-10-CM

## 2025-06-03 PROBLEM — J18.9 PNEUMONIA: Status: RESOLVED | Noted: 2021-12-30 | Resolved: 2025-06-03

## 2025-06-03 PROBLEM — N92.1 MENORRHAGIA WITH IRREGULAR CYCLE: Status: RESOLVED | Noted: 2024-10-29 | Resolved: 2025-06-03

## 2025-06-03 PROBLEM — K25.9 GASTRIC ULCER: Status: RESOLVED | Noted: 2017-02-07 | Resolved: 2025-06-03

## 2025-06-03 PROBLEM — K81.9 CHOLECYSTITIS: Status: RESOLVED | Noted: 2018-04-27 | Resolved: 2025-06-03

## 2025-06-03 PROBLEM — E03.9 HYPOTHYROIDISM: Status: RESOLVED | Noted: 2017-02-07 | Resolved: 2025-06-03

## 2025-06-03 PROBLEM — K80.20 SYMPTOMATIC CHOLELITHIASIS: Status: RESOLVED | Noted: 2018-04-24 | Resolved: 2025-06-03

## 2025-06-03 PROBLEM — Z00.00 ENCOUNTER FOR ANNUAL PHYSICAL EXAM: Status: RESOLVED | Noted: 2025-02-01 | Resolved: 2025-06-03

## 2025-06-03 PROBLEM — Z30.2 ADMISSION FOR STERILIZATION: Status: RESOLVED | Noted: 2019-03-07 | Resolved: 2025-06-03

## 2025-06-03 PROBLEM — R11.0 NAUSEA: Status: RESOLVED | Noted: 2017-02-07 | Resolved: 2025-06-03

## 2025-06-03 PROCEDURE — 99417 PROLNG OP E/M EACH 15 MIN: CPT | Performed by: NURSE PRACTITIONER

## 2025-06-03 PROCEDURE — 99215 OFFICE O/P EST HI 40 MIN: CPT | Performed by: NURSE PRACTITIONER

## 2025-06-03 RX ORDER — SODIUM CHLORIDE 9 MG/ML
40 INJECTION, SOLUTION INTRAVENOUS AS NEEDED
OUTPATIENT
Start: 2025-06-03

## 2025-06-03 RX ORDER — SODIUM CHLORIDE 0.9 % (FLUSH) 0.9 %
10 SYRINGE (ML) INJECTION EVERY 12 HOURS SCHEDULED
OUTPATIENT
Start: 2025-06-03

## 2025-06-03 RX ORDER — SODIUM CHLORIDE 0.9 % (FLUSH) 0.9 %
10 SYRINGE (ML) INJECTION AS NEEDED
OUTPATIENT
Start: 2025-06-03

## 2025-06-03 NOTE — PROGRESS NOTES
"Metabolic and Bariatric Surgery Nutrition Assessment    Patient Name: Alejo Pascal    YOB: 1990   Age: 34 y.o.  Sex: female  MRN: [id number]     ASSESSMENT    Procedure considering: sleeve    Anthropometric Measurements  Estimated body mass index is 46.91 kg/m² as calculated from the following:    Height as of an earlier encounter on 6/3/25: 166 cm (65.35\").    Weight as of an earlier encounter on 6/3/25: 129 kg (285 lb).    Patient Goals and Expectations                        Patient's stated weight goal: 180-200 lbs  Reasons for desiring weight loss: feel better, improved health     Medical History  Pertinent diagnosis/problems: GERD, dyslipidemia, bipolar disorder, depression, anxiety, back pain   Past Medical History:   Diagnosis Date    Anxiety     Asthma     Depression     GERD (gastroesophageal reflux disease)     Heavy menstrual bleeding     Kidney stone     Mixed hyperlipidemia 5/8/2025    Panic attack     Urinary tract infection      Past Surgical History:   Procedure Laterality Date    CHOLECYSTECTOMY  2018    D & C HYSTEROSCOPY ENDOMETRIAL ABLATION N/A 01/16/2025    Procedure: DILATATION AND CURETTAGE HYSTEROSCOPY NOVASURE ENDOMETRIAL ABLATION;  Surgeon: Ric Collins MD;  Location: [facility];  Service: Obstetrics/Gynecology;  Laterality: N/A;    HERNIA REPAIR      AS A BABY    TRIGGER FINGER RELEASE Right     TUBAL ABDOMINAL LIGATION  2019       Weight History   Highest adult weight: 285 lbs   Lowest adult weight: 185 lbs   Onset of obesity: adulthood   Possible triggers or life events that may have led to weight gain:   Having kids, eating habits, lifestyle      Previous Weight Loss Efforts   Patient has tried to lose weight in the past including:   Reduced calories, high protein, low fast, prescription medications and exercise.       Patient has lost up to 17 lbs on diets, but was unable to maintain this long term.        Diet History   Patient is eating 1 meals and 0-1 " snacks daily. Working 3rd shift. Usually just eats one big meal.       24 Hour Recall   Breakfast: None    Lunch: None    Dinner: Something from frozen, such as pizza rolls    Snacks: Chips, cottage cheese or sandwich    Beverages: Water, occasionally a soda        Eating out: 0-1 times per week    Vitamins/supplements: None    Dietary restrictions: NKFA      Patient identifies problem areas to be:   Physical hunger, over consumption, eating large portions, skipping meals, snacking on high calorie foods and inactivity.     Physical Activity   Work-related activity: Sedentary    Planned exercise: Walking 20 minutes most days and doing physical therapy exercises    Barriers to activity: Back pain      DIAGNOSIS     Nutrition problem statement: Obesity related to multifactorial biochemical, behavioral and environmental contributors to disease as evidenced by BMI 46.91 kg/m²    INTERVENTION    Pre and post op nutrition education and coaching for behavior change completed. Program materials provided. Emphasized the importance of taking in at least 70 g protein and 64 oz fluid daily. Discussed personal habits and lifestyle behaviors that may influence weight loss efforts. Self monitoring strategies such as keeping a food journal were encouraged. Patient verbalized understanding and questions were answered.    Patient will be evaluated by multidisciplinary team before undergoing a review of their candidacy. Additional nutrition counseling available and encouraged both pre and post op. Patient demonstrated a good comprehension of diet requirements and commitment to make healthy changes.     Alejo Pascal appears to be a suitable candidate for bariatric surgery from a nutritional standpoint.    MONITORING & EVALUATION    Goals/Plan:   Include lean protein with all meals  Decrease/eliminate carbonated and high calorie beverages   Practice eating mindfully, slowing down and limiting distractions      Total time spent during  this encounter today exceeded 30 minutes and includes preparing for the visit, reviewing tests, counseling and educating the patient/family/caregiver and documenting information in the medical record.    Electronically signed by:  Mayela Osman RD   06/03/25 08:29 EDT

## 2025-06-03 NOTE — H&P (VIEW-ONLY)
[facility]RIC SURGERY  [address]  [facility] [place] [id number] [phone]  [address]  [place] [address] [phone]  Dept: [phone]  6/3/2025      Alejo Pascal.  [id number]  [id number]  1990  female      Chief Complaint of weight gain; unable to maintain weight loss    History of Present Illness:   Alejo is a 34 y.o. female who presents today for evaluation, education and consultation regarding weight loss surgery. The patient is interested in the sleeve gastrectomy.      Diet History:Alejo has been overweight for at least 5 years, has been 35 pounds or more overweight for at least 5 years, has been 100 pounds or more overweight for 2 or more years and started dieting at age 29.  The most weight Alejo lost was 17 pounds on Wegovy and maintained the weight loss for 1 month. Alejo describes their eating habits as drinking soda. Alejo Pascal has tried Physician monitored, exercising, prescription medications, high protein, low carb, and calorie counting among others with success of losing up to 17 pounds, but in each instance regained the weight.     See dietician documentation for complete history.    Bariatric Surgery Evaluation: The patient is being seen for an initial visit for bariatric surgery evaluation and education.     Bariatric Co-morbidities:  sleep disturbances with possible sleep apnea, dyslipidemia, back pain, GERD, asthma, depression, mental health disease, and tobacco use    Patient Active Problem List   Diagnosis    Acute exacerbation of chronic low back pain    Adjustment disorder with anxious mood    Bipolar disorder    DDD (degenerative disc disease), lumbar    Family history of thyroid disease in mother    Generalized anxiety disorder    History of pneumonia    Major depressive disorder    Panic attack    Pelvic pain in female    Recurrent major depressive episodes, moderate     Elevated serum free T4 level    Vitamin D deficiency    GERD (gastroesophageal reflux disease)    Morbid obesity with BMI of 45.0-49.9, adult    Primary insomnia    Mixed hyperlipidemia    Seasonal allergies    Snoring    Mild intermittent asthma without complication    H/O gastric ulcer    Witnessed episode of apnea    Preoperative testing       Past Medical History:   Diagnosis Date    Anxiety     Asthma     Depression     GERD (gastroesophageal reflux disease)     Heavy menstrual bleeding     Hx of Bell's palsy     Kidney stone     Mixed hyperlipidemia 05/08/2025    Panic attack     Urinary tract infection        Past Surgical History:   Procedure Laterality Date    CHOLECYSTECTOMY  2018    D & C HYSTEROSCOPY ENDOMETRIAL ABLATION N/A 01/16/2025    Procedure: DILATATION AND CURETTAGE HYSTEROSCOPY NOVASURE ENDOMETRIAL ABLATION;  Surgeon: Ric Collins MD;  Location: [facility] MAIN OR;  Service: Obstetrics/Gynecology;  Laterality: N/A;    HERNIA REPAIR      AS A BABY; inguinal    TRIGGER FINGER RELEASE Right     TUBAL ABDOMINAL LIGATION  2019       No Known Allergies      Current Outpatient Medications:     albuterol sulfate  (90 Base) MCG/ACT inhaler, Inhale 2 puffs Every 6 (Six) Hours As Needed for Wheezing., Disp: 18 g, Rfl: 2    atorvastatin (Lipitor) 20 MG tablet, Take 1 tablet by mouth Daily for 360 days., Disp: 90 tablet, Rfl: 3    FLUoxetine (PROzac) 20 MG capsule, Take 1 capsule by mouth Daily., Disp: , Rfl:     loratadine (Claritin) 10 MG tablet, Take 1 tablet by mouth Daily., Disp: 90 tablet, Rfl: 3    omeprazole (priLOSEC) 20 MG capsule, Take 1 capsule by mouth Daily., Disp: , Rfl:     propranolol (INDERAL) 20 MG tablet, Take 1 tablet by mouth Daily., Disp: , Rfl:     QUEtiapine (SEROquel) 300 MG tablet, Take 1 tablet by mouth Every Night., Disp: 90 tablet, Rfl: 0    traMADol (ULTRAM) 50 MG tablet, 1 tablet as needed Orally q12h PRN severe pain for 30 days, Disp: , Rfl:     vitamin D  (ERGOCALCIFEROL) 1.25 MG (38322 UT) capsule capsule, Take 1 capsule by mouth 1 (One) Time Per Week., Disp: 12 capsule, Rfl: 3    LORazepam (ATIVAN) 0.5 MG tablet, Take 1 tablet by mouth 2 (Two) Times a Day As Needed for Anxiety (panic attack). (Patient not taking: Reported on 6/3/2025), Disp: , Rfl:     ondansetron ODT (ZOFRAN-ODT) 4 MG disintegrating tablet, Place 1 tablet on the tongue 4 (Four) Times a Day As Needed for Nausea or Vomiting., Disp: 30 tablet, Rfl: 0    Social History     Socioeconomic History    Marital status: Single   Tobacco Use    Smoking status: Every Day     Current packs/day: 1.50     Average packs/day: 1.5 packs/day for 20.0 years (30.0 ttl pk-yrs)     Types: Cigarettes     Passive exposure: Current    Smokeless tobacco: Never   Vaping Use    Vaping status: Never Used   Substance and Sexual Activity    Alcohol use: Yes     Alcohol/week: 4.0 standard drinks of alcohol     Types: 4 Drinks containing 0.5 oz of alcohol per week     Comment: TWICE A WEEK    Drug use: Never     Frequency: 1.0 times per week     Comment: previously used Ecstacy once a week, until 1 year ago    Sexual activity: Yes     Partners: Male     Birth control/protection: Tubal ligation       Family History   Problem Relation Age of Onset    Thyroid disease Mother     COPD Father     Cancer Maternal Grandfather     Cancer Maternal Grandmother     Cancer Maternal Aunt     Malig Hyperthermia Neg Hx          Review of Systems:  Review of Systems   Constitutional:  Positive for fatigue.   Respiratory:  Negative for chest tightness and shortness of breath.    Cardiovascular:  Negative for chest pain and palpitations.   Gastrointestinal:         Gerd   Genitourinary:         Nisha   Musculoskeletal:  Positive for back pain.   All other systems reviewed and are negative.      Physical Exam:  Vital Signs:  Weight: 129 kg (285 lb)   Body mass index is 46.91 kg/m².  Temp: 98.1 °F (36.7 °C)   Heart Rate: 78   BP: 120/75     Physical  Exam  Vitals reviewed.   Constitutional:       General: She is not in acute distress.     Appearance: Normal appearance. She is morbidly obese.   HENT:      Head: Normocephalic and atraumatic.      Mouth/Throat:      Mouth: Mucous membranes are moist.   Eyes:      General: No scleral icterus.     Extraocular Movements: Extraocular movements intact.      Conjunctiva/sclera: Conjunctivae normal.      Pupils: Pupils are equal, round, and reactive to light.   Cardiovascular:      Rate and Rhythm: Normal rate and regular rhythm.      Heart sounds: Normal heart sounds. No murmur heard.     No gallop.   Pulmonary:      Effort: Pulmonary effort is normal. No respiratory distress.      Breath sounds: Normal breath sounds.   Abdominal:      General: Bowel sounds are normal. There is no distension.      Palpations: Abdomen is soft. There is no mass.      Tenderness: There is no abdominal tenderness. There is no guarding.   Musculoskeletal:         General: Normal range of motion.      Cervical back: Normal range of motion and neck supple.   Skin:     General: Skin is warm and dry.   Neurological:      General: No focal deficit present.      Mental Status: She is alert and oriented to person, place, and time.   Psychiatric:         Mood and Affect: Mood normal.         Behavior: Behavior normal.         Thought Content: Thought content normal.         Judgment: Judgment normal.            Assessment:         Alejo Pascal is a 34 y.o. year old female with medically complicated severe obesity. Weight: 129 kg (285 lb), Body mass index is 46.91 kg/m². and weight related problems including sleep disturbances with possible sleep apnea, dyslipidemia, back pain, GERD, asthma, depression, mental health disease, and tobacco use.    I explained in detail, potential surgical options of interest to the patient including the RNY gastric bypass, sleeve gastrectomy, and gastric band while considering the patient's medical history. At this  time, the patient expressed interest in the Laparoscopic Sleeve  All of those procedures can be performed laparoscopically but there is a chance to convert to open if any technical challenges or complications do occur.  Bariatric surgery is not cosmetic surgery but rather a tool to help a patient make a life-long commitment lifestyle changes including diet, exercise, behavior changes, and taking supplemental vitamins and minerals.    Due to the patient's BMI, history, and co-morbidities related to potential surgical complications were evaluated. Due to Alejo Pascal's risk factors female will obtain the following prior to being scheduled for surgical intervention:    A letter of medical support as well as a history and physical must be obtained from their primary care provider. The patient was given a copy of a sample form, that will suffice as their letter to take to their primary are provider.    A referral for pre-operative psychological evaluation was ordered for the patient to evaluate candidacy as well as provide mental health support, should it be warranted before or after surgery.    Notes from primary care provider as well as labs were reviewed and  EKG, CXR, psychology clearance, and EGD with biopsywere ordered at this time. These will be drawn and patient will be notified with results. Patient will complete new, pre-operative radiology prior to being scheduled for surgery.     Alejo Pascal was screened for sleep apnea in our office today and based on their results she is at high risk.  A home sleep study will be ordered for her. The risks, as they relate to chronic hypercapnia r/t untreated BLAZE were discussed with the patient and She verbalized understanding.     A pre-operative diagnostic esophagogastroduodenoscopy with biopsy for evaluation will be ordered and scheduled for this patient. The risks and benefits of the procedure were discussed with the patient in detail and all questions were  answered.  Possibility of perforation, bleeding, aspiration, anoxic brain injury, respiratory and/or cardiac arrest and death were discussed.   She received handouts regarding, all questions were answered.     As this patient is a female who is post-menopausal or has undergone a surgical operation which precluded her from becoming pregnant she was not counseled related to conception and pregnancy.     The risks, benefits, alternatives, and potential complications of all of the sleeve gastrectomy explained in detail including, but not limited to death, anesthesia and medication adverse effect/DVT, pulmonary embolism, trocar site/incisional hernia, wound infection, abdominal infection, bleeding, failure to lose weight or gain weight and change in body image, metabolic complications with calcium, thiamine, vitamin B12, folate, iron, and anemia.    The patient was advised to start a high protein, low fat and low carbohydrate diet  start routine exercise including but not limited to 150 minutes per week. The patient received a resistance band along with a handout of exercises. The patient was given individualized information by our dietician along with handouts.     The patient was given information regarding the ROSMERY educational video. ROSMERY is an internet based educational video which explains the sourgical procedure and answers basic questions regarding the procedure. The patient was provided with instructions and a password to watch the video.    The patient understands the surgical procedures and the different surgical options that are available.  She understands the lifestyle changes that would be required after surgery and has agreed to participate in a pre-operative and postoperative weight management program.  She also expressed understanding of possible risks, had several questions answered and desires to proceed.    I think she is a good candidate for this surgery, and is interested in a sleeve  gastrectomy.    Encounter Diagnoses   Name Primary?    Morbid obesity with BMI of 45.0-49.9, adult Yes    Gastroesophageal reflux disease, unspecified whether esophagitis present     H/O gastric ulcer     Generalized anxiety disorder     Current mild episode of major depressive disorder, unspecified whether recurrent     Panic attack     Acute exacerbation of chronic low back pain     Mild intermittent asthma without complication     Primary insomnia     Snoring     Witnessed episode of apnea     Preoperative testing        Plan:    Diagnoses and all orders for this visit:    1. Morbid obesity with BMI of 45.0-49.9, adult (Primary)  -     ECG 12 Lead; Future  -     XR Chest 2 View; Future  -     Home Sleep Study; Future    2. Gastroesophageal reflux disease, unspecified whether esophagitis present  -     ECG 12 Lead; Future  -     XR Chest 2 View; Future    3. H/O gastric ulcer  -     ECG 12 Lead; Future  -     XR Chest 2 View; Future    4. Generalized anxiety disorder  Overview:  Description: (PL)  Description: (PL)    Orders:  -     ECG 12 Lead; Future  -     XR Chest 2 View; Future    5. Current mild episode of major depressive disorder, unspecified whether recurrent  -     ECG 12 Lead; Future  -     XR Chest 2 View; Future    6. Panic attack  -     ECG 12 Lead; Future  -     XR Chest 2 View; Future    7. Acute exacerbation of chronic low back pain  -     ECG 12 Lead; Future  -     XR Chest 2 View; Future    8. Mild intermittent asthma without complication  -     ECG 12 Lead; Future  -     XR Chest 2 View; Future  -     Home Sleep Study; Future    9. Primary insomnia  -     ECG 12 Lead; Future  -     XR Chest 2 View; Future  -     Home Sleep Study; Future    10. Snoring  -     ECG 12 Lead; Future  -     XR Chest 2 View; Future  -     Home Sleep Study; Future    11. Witnessed episode of apnea  -     ECG 12 Lead; Future  -     XR Chest 2 View; Future  -     Home Sleep Study; Future    12. Preoperative testing  -      ECG 12 Lead; Future  -     XR Chest 2 View; Future         Patient will be evaluated by a bariatric dietician psychologist before undergoing a multidisciplinary review of her candidacy. We discussed weight loss requirements prior to surgery and rationale, as well as other program requirements to ensure the safest approach to surgery. We spent time discussing different surgical procedures and plan of care throughout their lifespan to ensure long term success in achieving and maintaining a healthier weight. Patient will proceed with preoperative lab work, radiology, and endoscopy after obtaining agreed upon specialty, clearances, sleep study, and letter of support from her primary care provider.    Total time spent during this encounter today was 60 minutes and includes preparing for the visit, reviewing tests, performing a medically appropriate examination and/or evaluations, counseling and educating the patient/family/caregiver, ordering medications, tests, or procedures, documenting information in the medical record, and independently interpreting results and communicating that information with the patient/family/caregiver.    Fawn Viveros, APRN  6/3/2025

## 2025-06-03 NOTE — PROGRESS NOTES
[facility]ATR[facility] BARIATRIC SURGERY  [address]  [facility] [id number] [phone]  [address]  [facility] [address] [phone]  Dept: [phone]  6/3/2025      Alejo Pascal.  [id number]  [id number]  1990  female      Chief Complaint of weight gain; unable to maintain weight loss    History of Present Illness:   Alejo is a 34 y.o. female who presents today for evaluation, education and consultation regarding weight loss surgery. The patient is interested in the sleeve gastrectomy.      Diet History:Alejo has been overweight for at least 5 years, has been 35 pounds or more overweight for at least 5 years, has been 100 pounds or more overweight for 2 or more years and started dieting at age 29.  The most weight Alejo lost was 17 pounds on Wegovy and maintained the weight loss for 1 month. Alejo describes their eating habits as drinking soda. Alejo Pascal has tried Physician monitored, exercising, prescription medications, high protein, low carb, and calorie counting among others with success of losing up to 17 pounds, but in each instance regained the weight.     See dietician documentation for complete history.    Bariatric Surgery Evaluation: The patient is being seen for an initial visit for bariatric surgery evaluation and education.     Bariatric Co-morbidities:  sleep disturbances with possible sleep apnea, dyslipidemia, back pain, GERD, asthma, depression, mental health disease, and tobacco use    Patient Active Problem List   Diagnosis    Acute exacerbation of chronic low back pain    Adjustment disorder with anxious mood    Bipolar disorder    DDD (degenerative disc disease), lumbar    Family history of thyroid disease in mother    Generalized anxiety disorder    History of pneumonia    Major depressive disorder    Panic attack    Pelvic pain in female    Recurrent major depressive episodes, moderate     Elevated serum free T4 level    Vitamin D deficiency    GERD (gastroesophageal reflux disease)    Morbid obesity with BMI of 45.0-49.9, adult    Primary insomnia    Mixed hyperlipidemia    Seasonal allergies    Snoring    Mild intermittent asthma without complication    H/O gastric ulcer    Witnessed episode of apnea    Preoperative testing       Past Medical History:   Diagnosis Date    Anxiety     Asthma     Depression     GERD (gastroesophageal reflux disease)     Heavy menstrual bleeding     Hx of Bell's palsy     Kidney stone     Mixed hyperlipidemia 05/08/2025    Panic attack     Urinary tract infection        Past Surgical History:   Procedure Laterality Date    CHOLECYSTECTOMY  2018    D & C HYSTEROSCOPY ENDOMETRIAL ABLATION N/A 01/16/2025    Procedure: DILATATION AND CURETTAGE HYSTEROSCOPY NOVASURE ENDOMETRIAL ABLATION;  Surgeon: Ric Collins MD;  Location: [facility] MAIN OR;  Service: Obstetrics/Gynecology;  Laterality: N/A;    HERNIA REPAIR      AS A BABY; inguinal    TRIGGER FINGER RELEASE Right     TUBAL ABDOMINAL LIGATION  2019       No Known Allergies      Current Outpatient Medications:     albuterol sulfate  (90 Base) MCG/ACT inhaler, Inhale 2 puffs Every 6 (Six) Hours As Needed for Wheezing., Disp: 18 g, Rfl: 2    atorvastatin (Lipitor) 20 MG tablet, Take 1 tablet by mouth Daily for 360 days., Disp: 90 tablet, Rfl: 3    FLUoxetine (PROzac) 20 MG capsule, Take 1 capsule by mouth Daily., Disp: , Rfl:     loratadine (Claritin) 10 MG tablet, Take 1 tablet by mouth Daily., Disp: 90 tablet, Rfl: 3    omeprazole (priLOSEC) 20 MG capsule, Take 1 capsule by mouth Daily., Disp: , Rfl:     propranolol (INDERAL) 20 MG tablet, Take 1 tablet by mouth Daily., Disp: , Rfl:     QUEtiapine (SEROquel) 300 MG tablet, Take 1 tablet by mouth Every Night., Disp: 90 tablet, Rfl: 0    traMADol (ULTRAM) 50 MG tablet, 1 tablet as needed Orally q12h PRN severe pain for 30 days, Disp: , Rfl:     vitamin D  (ERGOCALCIFEROL) 1.25 MG (88673 UT) capsule capsule, Take 1 capsule by mouth 1 (One) Time Per Week., Disp: 12 capsule, Rfl: 3    LORazepam (ATIVAN) 0.5 MG tablet, Take 1 tablet by mouth 2 (Two) Times a Day As Needed for Anxiety (panic attack). (Patient not taking: Reported on 6/3/2025), Disp: , Rfl:     ondansetron ODT (ZOFRAN-ODT) 4 MG disintegrating tablet, Place 1 tablet on the tongue 4 (Four) Times a Day As Needed for Nausea or Vomiting., Disp: 30 tablet, Rfl: 0    Social History     Socioeconomic History    Marital status: Single   Tobacco Use    Smoking status: Every Day     Current packs/day: 1.50     Average packs/day: 1.5 packs/day for 20.0 years (30.0 ttl pk-yrs)     Types: Cigarettes     Passive exposure: Current    Smokeless tobacco: Never   Vaping Use    Vaping status: Never Used   Substance and Sexual Activity    Alcohol use: Yes     Alcohol/week: 4.0 standard drinks of alcohol     Types: 4 Drinks containing 0.5 oz of alcohol per week     Comment: TWICE A WEEK    Drug use: Never     Frequency: 1.0 times per week     Comment: previously used Ecstacy once a week, until 1 year ago    Sexual activity: Yes     Partners: Male     Birth control/protection: Tubal ligation       Family History   Problem Relation Age of Onset    Thyroid disease Mother     COPD Father     Cancer Maternal Grandfather     Cancer Maternal Grandmother     Cancer Maternal Aunt     Malig Hyperthermia Neg Hx          Review of Systems:  Review of Systems   Constitutional:  Positive for fatigue.   Respiratory:  Negative for chest tightness and shortness of breath.    Cardiovascular:  Negative for chest pain and palpitations.   Gastrointestinal:         Gerd   Genitourinary:         Nisha   Musculoskeletal:  Positive for back pain.   All other systems reviewed and are negative.      Physical Exam:  Vital Signs:  Weight: 129 kg (285 lb)   Body mass index is 46.91 kg/m².  Temp: 98.1 °F (36.7 °C)   Heart Rate: 78   BP: 120/75     Physical  Exam  Vitals reviewed.   Constitutional:       General: She is not in acute distress.     Appearance: Normal appearance. She is morbidly obese.   HENT:      Head: Normocephalic and atraumatic.      Mouth/Throat:      Mouth: Mucous membranes are moist.   Eyes:      General: No scleral icterus.     Extraocular Movements: Extraocular movements intact.      Conjunctiva/sclera: Conjunctivae normal.      Pupils: Pupils are equal, round, and reactive to light.   Cardiovascular:      Rate and Rhythm: Normal rate and regular rhythm.      Heart sounds: Normal heart sounds. No murmur heard.     No gallop.   Pulmonary:      Effort: Pulmonary effort is normal. No respiratory distress.      Breath sounds: Normal breath sounds.   Abdominal:      General: Bowel sounds are normal. There is no distension.      Palpations: Abdomen is soft. There is no mass.      Tenderness: There is no abdominal tenderness. There is no guarding.   Musculoskeletal:         General: Normal range of motion.      Cervical back: Normal range of motion and neck supple.   Skin:     General: Skin is warm and dry.   Neurological:      General: No focal deficit present.      Mental Status: She is alert and oriented to person, place, and time.   Psychiatric:         Mood and Affect: Mood normal.         Behavior: Behavior normal.         Thought Content: Thought content normal.         Judgment: Judgment normal.            Assessment:         Alejo Pascal is a 34 y.o. year old female with medically complicated severe obesity. Weight: 129 kg (285 lb), Body mass index is 46.91 kg/m². and weight related problems including sleep disturbances with possible sleep apnea, dyslipidemia, back pain, GERD, asthma, depression, mental health disease, and tobacco use.    I explained in detail, potential surgical options of interest to the patient including the RNY gastric bypass, sleeve gastrectomy, and gastric band while considering the patient's medical history. At this  time, the patient expressed interest in the Laparoscopic Sleeve  All of those procedures can be performed laparoscopically but there is a chance to convert to open if any technical challenges or complications do occur.  Bariatric surgery is not cosmetic surgery but rather a tool to help a patient make a life-long commitment lifestyle changes including diet, exercise, behavior changes, and taking supplemental vitamins and minerals.    Due to the patient's BMI, history, and co-morbidities related to potential surgical complications were evaluated. Due to Alejo Pascal's risk factors female will obtain the following prior to being scheduled for surgical intervention:    A letter of medical support as well as a history and physical must be obtained from their primary care provider. The patient was given a copy of a sample form, that will suffice as their letter to take to their primary are provider.    A referral for pre-operative psychological evaluation was ordered for the patient to evaluate candidacy as well as provide mental health support, should it be warranted before or after surgery.    Notes from primary care provider as well as labs were reviewed and  EKG, CXR, psychology clearance, and EGD with biopsywere ordered at this time. These will be drawn and patient will be notified with results. Patient will complete new, pre-operative radiology prior to being scheduled for surgery.     Alejo Pascal was screened for sleep apnea in our office today and based on their results she is at high risk.  A home sleep study will be ordered for her. The risks, as they relate to chronic hypercapnia r/t untreated BLAZE were discussed with the patient and She verbalized understanding.     A pre-operative diagnostic esophagogastroduodenoscopy with biopsy for evaluation will be ordered and scheduled for this patient. The risks and benefits of the procedure were discussed with the patient in detail and all questions were  answered.  Possibility of perforation, bleeding, aspiration, anoxic brain injury, respiratory and/or cardiac arrest and death were discussed.   She received handouts regarding, all questions were answered.     As this patient is a female who is post-menopausal or has undergone a surgical operation which precluded her from becoming pregnant she was not counseled related to conception and pregnancy.     The risks, benefits, alternatives, and potential complications of all of the sleeve gastrectomy explained in detail including, but not limited to death, anesthesia and medication adverse effect/DVT, pulmonary embolism, trocar site/incisional hernia, wound infection, abdominal infection, bleeding, failure to lose weight or gain weight and change in body image, metabolic complications with calcium, thiamine, vitamin B12, folate, iron, and anemia.    The patient was advised to start a high protein, low fat and low carbohydrate diet  start routine exercise including but not limited to 150 minutes per week. The patient received a resistance band along with a handout of exercises. The patient was given individualized information by our dietician along with handouts.     The patient was given information regarding the ROSMERY educational video. ROSMERY is an internet based educational video which explains the sourgical procedure and answers basic questions regarding the procedure. The patient was provided with instructions and a password to watch the video.    The patient understands the surgical procedures and the different surgical options that are available.  She understands the lifestyle changes that would be required after surgery and has agreed to participate in a pre-operative and postoperative weight management program.  She also expressed understanding of possible risks, had several questions answered and desires to proceed.    I think she is a good candidate for this surgery, and is interested in a sleeve  gastrectomy.    Encounter Diagnoses   Name Primary?    Morbid obesity with BMI of 45.0-49.9, adult Yes    Gastroesophageal reflux disease, unspecified whether esophagitis present     H/O gastric ulcer     Generalized anxiety disorder     Current mild episode of major depressive disorder, unspecified whether recurrent     Panic attack     Acute exacerbation of chronic low back pain     Mild intermittent asthma without complication     Primary insomnia     Snoring     Witnessed episode of apnea     Preoperative testing        Plan:    Diagnoses and all orders for this visit:    1. Morbid obesity with BMI of 45.0-49.9, adult (Primary)  -     ECG 12 Lead; Future  -     XR Chest 2 View; Future  -     Home Sleep Study; Future    2. Gastroesophageal reflux disease, unspecified whether esophagitis present  -     ECG 12 Lead; Future  -     XR Chest 2 View; Future    3. H/O gastric ulcer  -     ECG 12 Lead; Future  -     XR Chest 2 View; Future    4. Generalized anxiety disorder  Overview:  Description: (PL)  Description: (PL)    Orders:  -     ECG 12 Lead; Future  -     XR Chest 2 View; Future    5. Current mild episode of major depressive disorder, unspecified whether recurrent  -     ECG 12 Lead; Future  -     XR Chest 2 View; Future    6. Panic attack  -     ECG 12 Lead; Future  -     XR Chest 2 View; Future    7. Acute exacerbation of chronic low back pain  -     ECG 12 Lead; Future  -     XR Chest 2 View; Future    8. Mild intermittent asthma without complication  -     ECG 12 Lead; Future  -     XR Chest 2 View; Future  -     Home Sleep Study; Future    9. Primary insomnia  -     ECG 12 Lead; Future  -     XR Chest 2 View; Future  -     Home Sleep Study; Future    10. Snoring  -     ECG 12 Lead; Future  -     XR Chest 2 View; Future  -     Home Sleep Study; Future    11. Witnessed episode of apnea  -     ECG 12 Lead; Future  -     XR Chest 2 View; Future  -     Home Sleep Study; Future    12. Preoperative testing  -      ECG 12 Lead; Future  -     XR Chest 2 View; Future         Patient will be evaluated by a bariatric dietician psychologist before undergoing a multidisciplinary review of her candidacy. We discussed weight loss requirements prior to surgery and rationale, as well as other program requirements to ensure the safest approach to surgery. We spent time discussing different surgical procedures and plan of care throughout their lifespan to ensure long term success in achieving and maintaining a healthier weight. Patient will proceed with preoperative lab work, radiology, and endoscopy after obtaining agreed upon specialty, clearances, sleep study, and letter of support from her primary care provider.    Total time spent during this encounter today was 60 minutes and includes preparing for the visit, reviewing tests, performing a medically appropriate examination and/or evaluations, counseling and educating the patient/family/caregiver, ordering medications, tests, or procedures, documenting information in the medical record, and independently interpreting results and communicating that information with the patient/family/caregiver.    Fawn Viveros, APRN  6/3/2025

## 2025-06-05 ENCOUNTER — OFFICE VISIT (OUTPATIENT)
Dept: FAMILY MEDICINE CLINIC | Facility: CLINIC | Age: 35
End: 2025-06-05
Payer: COMMERCIAL

## 2025-06-05 ENCOUNTER — HOSPITAL ENCOUNTER (OUTPATIENT)
Dept: CARDIOLOGY | Facility: HOSPITAL | Age: 35
Discharge: HOME | End: 2025-06-05
Payer: COMMERCIAL

## 2025-06-05 ENCOUNTER — HOSPITAL ENCOUNTER (OUTPATIENT)
Dept: GENERAL RADIOLOGY | Facility: HOSPITAL | Age: 35
Discharge: HOME | End: 2025-06-05
Payer: COMMERCIAL

## 2025-06-05 VITALS
OXYGEN SATURATION: 98 % | TEMPERATURE: 97.5 F | HEIGHT: 65 IN | HEART RATE: 84 BPM | SYSTOLIC BLOOD PRESSURE: 118 MMHG | BODY MASS INDEX: 47.09 KG/M2 | DIASTOLIC BLOOD PRESSURE: 72 MMHG | WEIGHT: 282.6 LBS

## 2025-06-05 DIAGNOSIS — R06.81 WITNESSED EPISODE OF APNEA: ICD-10-CM

## 2025-06-05 DIAGNOSIS — Z87.11 H/O GASTRIC ULCER: ICD-10-CM

## 2025-06-05 DIAGNOSIS — F41.1 GENERALIZED ANXIETY DISORDER: ICD-10-CM

## 2025-06-05 DIAGNOSIS — F32.0 CURRENT MILD EPISODE OF MAJOR DEPRESSIVE DISORDER, UNSPECIFIED WHETHER RECURRENT: ICD-10-CM

## 2025-06-05 DIAGNOSIS — Z01.818 PREOPERATIVE TESTING: ICD-10-CM

## 2025-06-05 DIAGNOSIS — F41.0 PANIC ATTACK: ICD-10-CM

## 2025-06-05 DIAGNOSIS — Z72.0 TOBACCO ABUSE: ICD-10-CM

## 2025-06-05 DIAGNOSIS — M54.50 ACUTE EXACERBATION OF CHRONIC LOW BACK PAIN: ICD-10-CM

## 2025-06-05 DIAGNOSIS — F33.1 MODERATE EPISODE OF RECURRENT MAJOR DEPRESSIVE DISORDER: ICD-10-CM

## 2025-06-05 DIAGNOSIS — R06.83 SNORING: ICD-10-CM

## 2025-06-05 DIAGNOSIS — K21.9 GASTROESOPHAGEAL REFLUX DISEASE, UNSPECIFIED WHETHER ESOPHAGITIS PRESENT: ICD-10-CM

## 2025-06-05 DIAGNOSIS — E66.01 MORBID OBESITY WITH BMI OF 45.0-49.9, ADULT: ICD-10-CM

## 2025-06-05 DIAGNOSIS — G89.29 ACUTE EXACERBATION OF CHRONIC LOW BACK PAIN: ICD-10-CM

## 2025-06-05 DIAGNOSIS — E78.2 MIXED HYPERLIPIDEMIA: ICD-10-CM

## 2025-06-05 DIAGNOSIS — J30.2 SEASONAL ALLERGIES: ICD-10-CM

## 2025-06-05 DIAGNOSIS — K21.9 GASTROESOPHAGEAL REFLUX DISEASE WITHOUT ESOPHAGITIS: ICD-10-CM

## 2025-06-05 DIAGNOSIS — Z01.818 PRE-OPERATIVE EXAMINATION: Primary | ICD-10-CM

## 2025-06-05 DIAGNOSIS — F51.01 PRIMARY INSOMNIA: ICD-10-CM

## 2025-06-05 DIAGNOSIS — Z71.6 ENCOUNTER FOR SMOKING CESSATION COUNSELING: ICD-10-CM

## 2025-06-05 DIAGNOSIS — J45.20 MILD INTERMITTENT ASTHMA WITHOUT COMPLICATION: ICD-10-CM

## 2025-06-05 PROCEDURE — 93005 ELECTROCARDIOGRAM TRACING: CPT | Performed by: NURSE PRACTITIONER

## 2025-06-05 PROCEDURE — 71046 X-RAY EXAM CHEST 2 VIEWS: CPT

## 2025-06-05 PROCEDURE — 99214 OFFICE O/P EST MOD 30 MIN: CPT

## 2025-06-05 RX ORDER — VARENICLINE TARTRATE 1 MG/1
1 TABLET, FILM COATED ORAL 2 TIMES DAILY
Qty: 56 TABLET | Refills: 1 | Status: SHIPPED | OUTPATIENT
Start: 2025-07-03 | End: 2025-08-28

## 2025-06-05 RX ORDER — QUETIAPINE FUMARATE 300 MG/1
300 TABLET, FILM COATED ORAL NIGHTLY
Qty: 90 TABLET | Refills: 0 | Status: SHIPPED | OUTPATIENT
Start: 2025-06-05

## 2025-06-05 RX ORDER — VARENICLINE TARTRATE 0.5 (11)-1
KIT ORAL
Qty: 1 EACH | Refills: 0 | Status: SHIPPED | OUTPATIENT
Start: 2025-06-05 | End: 2025-07-03

## 2025-06-05 NOTE — PROGRESS NOTES
Chief Complaint  Pre-op Exam (For bariatric surgery)    Subjective            History of Present Illness      Patient presents today for Pre-op clearance for bariatric surgery. She is interested in the gastric sleeve. Patient is currently seeing [facility] and they are working her up for surgery.   Surgery date is not planned as of yet.  When asked about Anesthesia; patient denies history of anesthesia complications, or difficulty waking up from anesthesia.   Patient's BMI is currently 46.52 and she is 282 lbs.  She is a current smoker.  She would like to quit smoking and needs to stop prior to her surgery.  Used nicotine patches in the past and those were not helpful.  Reports she has tried Chantix in the past and that was helpful for her and she declined negative side effects.    Patient is currently taking lipitor for cholesterol and tolerating well.  Patient is also followed by pain mgmt for chronic back pain.  Patient does have history of seasonal allergies and mild asthma. Symptoms are currently  well controlled with claritin and albuterol inhaler.  History of acid reflux and gastric ulcer, well controlled with prilosec. Bariatrics is scheduling her for EGD prior to surgery.    Anxiety/depression/panic attacks  Reports she is currently Seeing psych.  Currently on prozac, propranolol, and seroquel.  Propranolol is new for her.  Stopped ativan due to pain mgmt. They wouldn't let her stay on ativan and tramadol.    Patient does have history of snoring, Bariatrics screened her for sleep apnea and she was at a high risk. They ordered an at home sleep study for patient to have completed prior to surgery.    She had some of her pre-op testing completed today by Bariatrics.  EKG; sinus rhythm, borderline t abnormalities, prolonged QT interval; no significant change from previous EKG.    Chest xray  FINDINGS:    No focal opacity. No effusions. Normal mediastinal contour. Heart is  normal in  size.  IMPRESSION:  No acute cardiopulmonary process    They did not repeat labs due to her having them drawn here fairly recently.  CBC CMP LIPID TSH A1C;   Lab Results   Component Value Date    WBC 7.03 01/09/2025    HGB 14.4 01/09/2025    HCT 41.5 01/09/2025    MCV 93.5 01/09/2025     01/09/2025     Lab Results   Component Value Date    GLUCOSE 105 (H) 05/08/2025    BUN 12 05/08/2025    CREATININE 0.76 05/08/2025     05/08/2025    K 4.2 05/08/2025     05/08/2025    CALCIUM 9.4 05/08/2025    PROTEINTOT 7.1 05/08/2025    ALBUMIN 4.0 05/08/2025    ALT 29 05/08/2025    AST 27 05/08/2025    ALKPHOS 85 05/08/2025    BILITOT 0.3 05/08/2025    GLOB 3.1 05/08/2025    AGRATIO 1.3 05/08/2025    BCR 15.8 05/08/2025    ANIONGAP 10.1 01/09/2025    EGFR 104.9 05/08/2025     Lab Results   Component Value Date    CHLPL 160 05/08/2025    TRIG 156 (H) 05/08/2025    HDL 52 05/08/2025    LDL 81 05/08/2025     Lab Results   Component Value Date    TSH 3.250 01/31/2025     Lab Results   Component Value Date    HGBA1C 5.3 01/31/2025       Past Medical History:   Diagnosis Date    Anxiety     Asthma     Depression     GERD (gastroesophageal reflux disease)     Heavy menstrual bleeding     Hx of Bell's palsy     Kidney stone     Mixed hyperlipidemia 05/08/2025    Panic attack     Urinary tract infection      Past Surgical History:   Procedure Laterality Date    CHOLECYSTECTOMY  2018    D & C HYSTEROSCOPY ENDOMETRIAL ABLATION N/A 01/16/2025    Procedure: DILATATION AND CURETTAGE HYSTEROSCOPY NOVASURE ENDOMETRIAL ABLATION;  Surgeon: Ric Collins MD;  Location: [facility] MAIN OR;  Service: Obstetrics/Gynecology;  Laterality: N/A;    HERNIA REPAIR      AS A BABY; inguinal    TRIGGER FINGER RELEASE Right     TUBAL ABDOMINAL LIGATION  2019     Family History   Problem Relation Age of Onset    Thyroid disease Mother     COPD Father     Cancer Maternal Grandfather     Cancer Maternal Grandmother     Cancer Maternal Aunt      "Malig Hyperthermia Neg Hx        Objective   Vital Signs:  /72 (BP Location: Left arm, Patient Position: Sitting, Cuff Size: Adult)   Pulse 84   Temp 97.5 °F (36.4 °C)   Ht 166 cm (65.35\")   Wt 128 kg (282 lb 9.6 oz)   SpO2 98%   BMI 46.52 kg/m²   Estimated body mass index is 46.52 kg/m² as calculated from the following:    Height as of this encounter: 166 cm (65.35\").    Weight as of this encounter: 128 kg (282 lb 9.6 oz).            Physical Exam  Vitals reviewed.   Constitutional:       General: She is not in acute distress.     Appearance: She is obese.   HENT:      Head: Normocephalic and atraumatic.      Nose: Nose normal. No congestion.      Mouth/Throat:      Mouth: Mucous membranes are moist.      Pharynx: No oropharyngeal exudate or posterior oropharyngeal erythema.   Eyes:      Conjunctiva/sclera: Conjunctivae normal.      Pupils: Pupils are equal, round, and reactive to light.   Cardiovascular:      Rate and Rhythm: Normal rate and regular rhythm.      Pulses: Normal pulses.      Heart sounds: No murmur heard.     No gallop.   Pulmonary:      Effort: Pulmonary effort is normal. No respiratory distress.      Breath sounds: Normal breath sounds. No wheezing.   Abdominal:      General: Bowel sounds are normal. There is no distension.      Palpations: Abdomen is soft.      Tenderness: There is no abdominal tenderness.   Musculoskeletal:         General: Normal range of motion.      Cervical back: Normal range of motion and neck supple. No tenderness.      Right lower leg: No edema.      Left lower leg: No edema.   Skin:     General: Skin is warm and dry.   Neurological:      Mental Status: She is alert and oriented to person, place, and time. Mental status is at baseline.   Psychiatric:         Mood and Affect: Mood normal.        Result Review :                Assessment and Plan   Diagnoses and all orders for this visit:    1. Pre-operative examination (Primary)    2. Primary insomnia  -     " QUEtiapine (SEROquel) 300 MG tablet; Take 1 tablet by mouth Every Night.  Dispense: 90 tablet; Refill: 0    3. Encounter for smoking cessation counseling  -     Varenicline Tartrate, Starter, 0.5 MG X 11 & 1 MG X 42 tablet therapy pack; Take 0.5 mg by mouth Daily for 3 days, THEN 0.5 mg 2 (Two) Times a Day for 4 days, THEN 1 mg 2 (Two) Times a Day for 21 days. Take 0.5 mg po daily x 3 days, then 0.5 mg po bid x 4 days, then 1 mg po bid  Dispense: 1 each; Refill: 0  -     varenicline (Chantix Continuing Month Avi) 1 MG tablet; Take 1 tablet by mouth 2 (Two) Times a Day for 56 days.  Dispense: 56 tablet; Refill: 1    4. Tobacco abuse  -     Varenicline Tartrate, Starter, 0.5 MG X 11 & 1 MG X 42 tablet therapy pack; Take 0.5 mg by mouth Daily for 3 days, THEN 0.5 mg 2 (Two) Times a Day for 4 days, THEN 1 mg 2 (Two) Times a Day for 21 days. Take 0.5 mg po daily x 3 days, then 0.5 mg po bid x 4 days, then 1 mg po bid  Dispense: 1 each; Refill: 0  -     varenicline (Chantix Continuing Month Avi) 1 MG tablet; Take 1 tablet by mouth 2 (Two) Times a Day for 56 days.  Dispense: 56 tablet; Refill: 1    5. Mixed hyperlipidemia    6. Moderate episode of recurrent major depressive disorder    7. Panic attack    8. Generalized anxiety disorder    9. Snoring    10. Mild intermittent asthma without complication    11. Seasonal allergies    12. Gastroesophageal reflux disease without esophagitis    13. H/O gastric ulcer      Discussed new medications and s/e with patient. Encouraged to quit smoking with chantix. She understands that smoking can impair healing process, cause breathing and lung issues. Reports she is going to try hard to stop smoking and follow the diet plan bariatrics has provided why she is getting worked up for the surgery.   I believe Ms. Pascal is a good candidate for this surgery and the benefits outweigh the risks.    I examined this patient, checked all appropriate lab work and tests and certify, that to the best  of my knowledge, there is not a current medical contraindication for undergoing elective surgery with a general and/or regional anesthesia.  Patient's current chronic conditions are well controlled with medications.    Patient does have history of snoring, Bariatrics screened her for sleep apnea and she was at a high risk. They ordered an at home sleep study for patient to have completed prior to surgery. If patient is diagnosed with sleep apnea after study, that does place her at a higher risk for undergoing anesthesia. Patient must be compliant with sleep apnea machine if one is ordered. The anesthesia team and surgeon can prepare appropriately for the surgery with those risks.  If diagnosed with sleep apnea, I do still think patient is a good candidate for the procedure, and the benefits outweigh the risks. Bariatrics is completed a thorough workup prior to scheduling surgery.    Paperwork completed today, please see media.    Bariatric note: 6/3/25  Patient will be evaluated by a bariatric dietician psychologist before undergoing a multidisciplinary review of her candidacy. We discussed weight loss requirements prior to surgery and rationale, as well as other program requirements to ensure the safest approach to surgery. We spent time discussing different surgical procedures and plan of care throughout their lifespan to ensure long term success in achieving and maintaining a healthier weight. Patient will proceed with preoperative lab work, radiology, and endoscopy after obtaining agreed upon specialty, clearances, sleep study, and letter of support from her primary care provider.  I think she is a good candidate for this surgery, and is interested in a sleeve gastrectomy.  The risks, benefits, alternatives, and potential complications of all of the sleeve gastrectomy explained in detail including, but not limited to death, anesthesia and medication adverse effect/DVT, pulmonary embolism, trocar site/incisional  hernia, wound infection, abdominal infection, bleeding, failure to lose weight or gain weight and change in body image, metabolic complications with calcium, thiamine, vitamin B12, folate, iron, and anemia.                  Follow Up   Return if symptoms worsen or fail to improve.  Patient was given instructions and counseling regarding her condition or for health maintenance advice. Please see specific information pulled into the AVS if appropriate.

## 2025-06-06 ENCOUNTER — PATIENT ROUNDING (BHMG ONLY) (OUTPATIENT)
Dept: BARIATRICS/WEIGHT MGMT | Facility: CLINIC | Age: 35
End: 2025-06-06
Payer: COMMERCIAL

## 2025-06-06 LAB
QT INTERVAL: 384 MS
QTC INTERVAL: 491 MS

## 2025-06-06 NOTE — PROGRESS NOTES
Good afternoon,    My name is Pricilla Schilling, I am the Practice Manager for [facility].      I want to officially welcome you to our practice and ask about your recent visit.      If you could tell me about your recent visit with us. What things went well?      We're always looking for ways to make our patients experiences even better. Do you have recommendations on ways we may improve?      I appreciate you taking the time to answer a few questions today.      Thank you, and have a great day!        Message was sent to the patient via [facility] [place] [facility] [place] [facility] [place]

## 2025-06-09 PROBLEM — Z01.818 PRE-OPERATIVE EXAMINATION: Status: ACTIVE | Noted: 2025-06-09

## 2025-06-09 PROBLEM — Z72.0 TOBACCO ABUSE: Status: ACTIVE | Noted: 2025-06-09

## 2025-06-09 PROBLEM — Z71.6 ENCOUNTER FOR SMOKING CESSATION COUNSELING: Status: ACTIVE | Noted: 2025-06-09

## 2025-06-29 ENCOUNTER — HOSPITAL ENCOUNTER (EMERGENCY)
Facility: HOSPITAL | Age: 35
Discharge: HOME | End: 2025-06-29
Attending: EMERGENCY MEDICINE | Admitting: EMERGENCY MEDICINE
Payer: COMMERCIAL

## 2025-06-29 ENCOUNTER — APPOINTMENT (OUTPATIENT)
Dept: GENERAL RADIOLOGY | Facility: HOSPITAL | Age: 35
End: 2025-06-29
Payer: COMMERCIAL

## 2025-06-29 VITALS
RESPIRATION RATE: 20 BRPM | HEIGHT: 66 IN | DIASTOLIC BLOOD PRESSURE: 79 MMHG | BODY MASS INDEX: 45.8 KG/M2 | TEMPERATURE: 98.8 F | WEIGHT: 285 LBS | OXYGEN SATURATION: 99 % | SYSTOLIC BLOOD PRESSURE: 116 MMHG | HEART RATE: 95 BPM

## 2025-06-29 DIAGNOSIS — F41.0 ANXIETY ATTACK: Primary | ICD-10-CM

## 2025-06-29 LAB
ALBUMIN SERPL-MCNC: 3.7 G/DL (ref 3.5–5.2)
ALBUMIN/GLOB SERPL: 1.1 G/DL
ALP SERPL-CCNC: 77 U/L (ref 39–117)
ALT SERPL W P-5'-P-CCNC: 19 U/L (ref 1–33)
ANION GAP SERPL CALCULATED.3IONS-SCNC: 15 MMOL/L (ref 5–15)
AST SERPL-CCNC: 24 U/L (ref 1–32)
BASOPHILS # BLD AUTO: 0.05 10*3/MM3 (ref 0–0.2)
BASOPHILS NFR BLD AUTO: 0.7 % (ref 0–1.5)
BILIRUB SERPL-MCNC: <0.2 MG/DL (ref 0–1.2)
BUN SERPL-MCNC: 8 MG/DL (ref 6–20)
BUN/CREAT SERPL: 11.1 (ref 7–25)
CALCIUM SPEC-SCNC: 8.7 MG/DL (ref 8.6–10.5)
CHLORIDE SERPL-SCNC: 103 MMOL/L (ref 98–107)
CO2 SERPL-SCNC: 21 MMOL/L (ref 22–29)
CREAT SERPL-MCNC: 0.72 MG/DL (ref 0.57–1)
DEPRECATED RDW RBC AUTO: 43.1 FL (ref 37–54)
EGFRCR SERPLBLD CKD-EPI 2021: 111.3 ML/MIN/1.73
EOSINOPHIL # BLD AUTO: 0.18 10*3/MM3 (ref 0–0.4)
EOSINOPHIL NFR BLD AUTO: 2.6 % (ref 0.3–6.2)
ERYTHROCYTE [DISTWIDTH] IN BLOOD BY AUTOMATED COUNT: 12.5 % (ref 12.3–15.4)
GLOBULIN UR ELPH-MCNC: 3.4 GM/DL
GLUCOSE SERPL-MCNC: 92 MG/DL (ref 65–99)
HCT VFR BLD AUTO: 42.6 % (ref 34–46.6)
HGB BLD-MCNC: 14.5 G/DL (ref 12–15.9)
IMM GRANULOCYTES # BLD AUTO: 0.03 10*3/MM3 (ref 0–0.05)
IMM GRANULOCYTES NFR BLD AUTO: 0.4 % (ref 0–0.5)
LYMPHOCYTES # BLD AUTO: 1.05 10*3/MM3 (ref 0.7–3.1)
LYMPHOCYTES NFR BLD AUTO: 15.4 % (ref 19.6–45.3)
MCH RBC QN AUTO: 31.9 PG (ref 26.6–33)
MCHC RBC AUTO-ENTMCNC: 34 G/DL (ref 31.5–35.7)
MCV RBC AUTO: 93.8 FL (ref 79–97)
MONOCYTES # BLD AUTO: 0.7 10*3/MM3 (ref 0.1–0.9)
MONOCYTES NFR BLD AUTO: 10.2 % (ref 5–12)
NEUTROPHILS NFR BLD AUTO: 4.82 10*3/MM3 (ref 1.7–7)
NEUTROPHILS NFR BLD AUTO: 70.7 % (ref 42.7–76)
NRBC BLD AUTO-RTO: 0 /100 WBC (ref 0–0.2)
PLATELET # BLD AUTO: 216 10*3/MM3 (ref 140–450)
PMV BLD AUTO: 9.7 FL (ref 6–12)
POTASSIUM SERPL-SCNC: 3.7 MMOL/L (ref 3.5–5.2)
PROT SERPL-MCNC: 7.1 G/DL (ref 6–8.5)
RBC # BLD AUTO: 4.54 10*6/MM3 (ref 3.77–5.28)
SODIUM SERPL-SCNC: 139 MMOL/L (ref 136–145)
TROPONIN T SERPL HS-MCNC: <6 NG/L
WBC NRBC COR # BLD AUTO: 6.83 10*3/MM3 (ref 3.4–10.8)

## 2025-06-29 PROCEDURE — 80053 COMPREHEN METABOLIC PANEL: CPT | Performed by: EMERGENCY MEDICINE

## 2025-06-29 PROCEDURE — 85025 COMPLETE CBC W/AUTO DIFF WBC: CPT | Performed by: EMERGENCY MEDICINE

## 2025-06-29 PROCEDURE — 71045 X-RAY EXAM CHEST 1 VIEW: CPT

## 2025-06-29 PROCEDURE — 25010000002 LORAZEPAM PER 2 MG: Performed by: EMERGENCY MEDICINE

## 2025-06-29 PROCEDURE — 25810000003 SODIUM CHLORIDE 0.9 % SOLUTION: Performed by: EMERGENCY MEDICINE

## 2025-06-29 PROCEDURE — 93005 ELECTROCARDIOGRAM TRACING: CPT | Performed by: EMERGENCY MEDICINE

## 2025-06-29 PROCEDURE — 96374 THER/PROPH/DIAG INJ IV PUSH: CPT

## 2025-06-29 PROCEDURE — 99284 EMERGENCY DEPT VISIT MOD MDM: CPT

## 2025-06-29 PROCEDURE — 84484 ASSAY OF TROPONIN QUANT: CPT | Performed by: EMERGENCY MEDICINE

## 2025-06-29 RX ORDER — SODIUM CHLORIDE 0.9 % (FLUSH) 0.9 %
10 SYRINGE (ML) INJECTION AS NEEDED
Status: DISCONTINUED | OUTPATIENT
Start: 2025-06-29 | End: 2025-06-29 | Stop reason: HOSPADM

## 2025-06-29 RX ORDER — LORAZEPAM 2 MG/ML
0.5 INJECTION INTRAMUSCULAR ONCE
Status: COMPLETED | OUTPATIENT
Start: 2025-06-29 | End: 2025-06-29

## 2025-06-29 RX ADMIN — LORAZEPAM 0.5 MG: 2 INJECTION INTRAMUSCULAR; INTRAVENOUS at 17:12

## 2025-06-29 RX ADMIN — SODIUM CHLORIDE 500 ML: 9 INJECTION, SOLUTION INTRAVENOUS at 17:12

## 2025-06-29 NOTE — ED PROVIDER NOTES
EMERGENCY DEPARTMENT ENCOUNTER    Room Number:  18/18  PCP: Diane Holguin APRN  Independent Historians: EM_Historian: Patient    HPI:  Chief Complaint: had concerns including Anxiety.      A complete HPI/ROS/PMH/PSH/SH/FH are unobtainable due to: EM_Unobtainable History : None    Chronic or social conditions impacting patient care (Social Determinants of Health): None      Context: Alejo Pascal is a 35 y.o. female with a medical history of anxiety, gerd who presents to the ED c/o acute panic attack. Pt had been looking for car keys that were lost and began with sob, chest tightness, paresthesias in fingers and hyperventilating.  She has had panic attacks for a long time but hadn't felt this bad with one in a while so called ems.  Pt now feels a lot better but still with some anxiety. No cough, no fever, no n/v/d.        Review of prior external notes (non-ED) -and- Review of prior external test results outside of this encounter: Pt seen by pmd on 6/5/25 as a preop exam in prep for bariatric surgery    Prescription drug monitoring program review:     EM_[place] : N/A    PAST MEDICAL HISTORY  Active Ambulatory Problems     Diagnosis Date Noted    Acute exacerbation of chronic low back pain 10/04/2019    Adjustment disorder with anxious mood 01/31/2025    Bipolar disorder 02/07/2017    DDD (degenerative disc disease), lumbar 01/30/2024    Family history of thyroid disease in mother 12/31/2021    Generalized anxiety disorder 12/08/2016    History of pneumonia 12/31/2021    Major depressive disorder 01/31/2025    Panic attack 04/26/2018    Pelvic pain in female 04/27/2017    Recurrent major depressive episodes, moderate 01/07/2019    Elevated serum free T4 level 02/01/2025    Vitamin D deficiency 02/01/2025    GERD (gastroesophageal reflux disease)     Morbid obesity with BMI of 45.0-49.9, adult 02/28/2025    Primary insomnia 02/28/2025    Mixed hyperlipidemia 05/08/2025    Seasonal allergies 05/08/2025     Snoring 06/03/2025    Mild intermittent asthma without complication 06/03/2025    H/O gastric ulcer 06/03/2025    Witnessed episode of apnea 06/03/2025    Preoperative testing 06/03/2025    Pre-operative examination 06/09/2025    Encounter for smoking cessation counseling 06/09/2025    Tobacco abuse 06/09/2025     Resolved Ambulatory Problems     Diagnosis Date Noted    Menorrhagia with irregular cycle 10/29/2024    Admission for sterilization 03/07/2019    Anxiety 02/07/2017    Cholecystitis 04/27/2018    Gastric ulcer 02/07/2017    Hypothyroidism 02/07/2017    Nausea 02/07/2017    Pneumonia 12/30/2021    Symptomatic cholelithiasis 04/24/2018    Encounter for annual physical exam 02/01/2025    Encounter for lipid screening for cardiovascular disease 02/01/2025    Encounter for screening for diabetes mellitus 02/01/2025     Past Medical History:   Diagnosis Date    Asthma     Depression     Heavy menstrual bleeding     Hx of Bell's palsy     Kidney stone     Urinary tract infection          PAST SURGICAL HISTORY  Past Surgical History:   Procedure Laterality Date    CHOLECYSTECTOMY  2018    D & C HYSTEROSCOPY ENDOMETRIAL ABLATION N/A 01/16/2025    Procedure: DILATATION AND CURETTAGE HYSTEROSCOPY NOVASURE ENDOMETRIAL ABLATION;  Surgeon: Ric Collins MD;  Location: [facility];  Service: Obstetrics/Gynecology;  Laterality: N/A;    HERNIA REPAIR      AS A BABY; inguinal    TRIGGER FINGER RELEASE Right     TUBAL ABDOMINAL LIGATION  2019         FAMILY HISTORY  Family History   Problem Relation Age of Onset    Thyroid disease Mother     COPD Father     Cancer Maternal Grandfather     Cancer Maternal Grandmother     Cancer Maternal Aunt     Malig Hyperthermia Neg Hx          SOCIAL HISTORY  Social History     Socioeconomic History    Marital status: Single   Tobacco Use    Smoking status: Every Day     Current packs/day: 1.50     Average packs/day: 1.5 packs/day for 20.0 years (30.0 ttl pk-yrs)     Types:  Cigarettes     Passive exposure: Current    Smokeless tobacco: Never   Vaping Use    Vaping status: Never Used   Substance and Sexual Activity    Alcohol use: Yes     Alcohol/week: 4.0 standard drinks of alcohol     Types: 4 Drinks containing 0.5 oz of alcohol per week     Comment: TWICE A WEEK    Drug use: Never     Frequency: 1.0 times per week     Comment: previously used Ecstacy once a week, until 1 year ago    Sexual activity: Yes     Partners: Male     Birth control/protection: Tubal ligation         ALLERGIES  Patient has no known allergies.        REVIEW OF SYSTEMS  Review of Systems  Included in HPI  All systems reviewed and negative except for those discussed in HPI.      PHYSICAL EXAM    I have reviewed the triage vital signs and nursing notes.    ED Triage Vitals [06/29/25 1644]   Temp Heart Rate Resp BP SpO2   98.8 °F (37.1 °C) 106 20 118/84 98 %      Temp src Heart Rate Source Patient Position BP Location FiO2 (%)   Oral -- -- -- --       Physical Exam  GENERAL: cooperative but anxious obese f, alert  SKIN: Warm, dry  HENT: Normocephalic, atraumatic  EYES: no scleral icterus  CV: regular rhythm, regular rate no murmur  RESPIRATORY: normal effort, lungs clear, no wheezing  ABDOMEN: soft, nontender, nondistended  MUSCULOSKELETAL: no deformity, no le edema  NEURO: alert, moves all extremities, follows commands                                                                   LAB RESULTS  Recent Results (from the past 24 hours)   ECG 12 Lead Chest Pain    Collection Time: 06/29/25  4:56 PM   Result Value Ref Range    QT Interval 376 ms    QTC Interval 482 ms   Comprehensive Metabolic Panel    Collection Time: 06/29/25  5:11 PM    Specimen: Arm, Right; Blood   Result Value Ref Range    Glucose 92 65 - 99 mg/dL    BUN 8.0 6.0 - 20.0 mg/dL    Creatinine 0.72 0.57 - 1.00 mg/dL    Sodium 139 136 - 145 mmol/L    Potassium 3.7 3.5 - 5.2 mmol/L    Chloride 103 98 - 107 mmol/L    CO2 21.0 (L) 22.0 - 29.0 mmol/L     Calcium 8.7 8.6 - 10.5 mg/dL    Total Protein 7.1 6.0 - 8.5 g/dL    Albumin 3.7 3.5 - 5.2 g/dL    ALT (SGPT) 19 1 - 33 U/L    AST (SGOT) 24 1 - 32 U/L    Alkaline Phosphatase 77 39 - 117 U/L    Total Bilirubin <0.2 0.0 - 1.2 mg/dL    Globulin 3.4 gm/dL    A/G Ratio 1.1 g/dL    BUN/Creatinine Ratio 11.1 7.0 - 25.0    Anion Gap 15.0 5.0 - 15.0 mmol/L    eGFR 111.3 >60.0 mL/min/1.73   High Sensitivity Troponin T    Collection Time: 06/29/25  5:11 PM    Specimen: Arm, Right; Blood   Result Value Ref Range    HS Troponin T <6 <14 ng/L   CBC Auto Differential    Collection Time: 06/29/25  5:11 PM    Specimen: Arm, Right; Blood   Result Value Ref Range    WBC 6.83 3.40 - 10.80 10*3/mm3    RBC 4.54 3.77 - 5.28 10*6/mm3    Hemoglobin 14.5 12.0 - 15.9 g/dL    Hematocrit 42.6 34.0 - 46.6 %    MCV 93.8 79.0 - 97.0 fL    MCH 31.9 26.6 - 33.0 pg    MCHC 34.0 31.5 - 35.7 g/dL    RDW 12.5 12.3 - 15.4 %    RDW-SD 43.1 37.0 - 54.0 fl    MPV 9.7 6.0 - 12.0 fL    Platelets 216 140 - 450 10*3/mm3    Neutrophil % 70.7 42.7 - 76.0 %    Lymphocyte % 15.4 (L) 19.6 - 45.3 %    Monocyte % 10.2 5.0 - 12.0 %    Eosinophil % 2.6 0.3 - 6.2 %    Basophil % 0.7 0.0 - 1.5 %    Immature Grans % 0.4 0.0 - 0.5 %    Neutrophils, Absolute 4.82 1.70 - 7.00 10*3/mm3    Lymphocytes, Absolute 1.05 0.70 - 3.10 10*3/mm3    Monocytes, Absolute 0.70 0.10 - 0.90 10*3/mm3    Eosinophils, Absolute 0.18 0.00 - 0.40 10*3/mm3    Basophils, Absolute 0.05 0.00 - 0.20 10*3/mm3    Immature Grans, Absolute 0.03 0.00 - 0.05 10*3/mm3    nRBC 0.0 0.0 - 0.2 /100 WBC         RADIOLOGY  XR Chest 1 View  Result Date: 6/29/2025  CXR ONE VIEW  HISTORY: short of breath  COMPARISON: Chest x-ray 6/5/2025  TECHNIQUE: single portable AP       The heart size is within normal limits.  The lungs are normally aerated. There is no pleural effusion or pneumothorax.    This report was finalized on 6/29/2025 5:25 PM by Dr. Dawson Ralph M.D on Workstation: [id number]          MEDICATIONS  GIVEN IN ER  Medications   sodium chloride 0.9 % bolus 500 mL (0 mL Intravenous Stopped 6/29/25 1839)   LORazepam (ATIVAN) injection 0.5 mg (0.5 mg Intravenous Given 6/29/25 1712)         ORDERS PLACED DURING THIS VISIT:  Orders Placed This Encounter   Procedures    XR Chest 1 View    Comprehensive Metabolic Panel    High Sensitivity Troponin T    CBC Auto Differential    ECG 12 Lead Chest Pain    CBC & Differential         OUTPATIENT MEDICATION MANAGEMENT:  No current Epic-ordered facility-administered medications on file.     Current Outpatient Medications Ordered in Epic   Medication Sig Dispense Refill    albuterol sulfate  (90 Base) MCG/ACT inhaler Inhale 2 puffs Every 6 (Six) Hours As Needed for Wheezing. 18 g 2    atorvastatin (Lipitor) 20 MG tablet Take 1 tablet by mouth Daily for 360 days. 90 tablet 3    FLUoxetine (PROzac) 20 MG capsule Take 1 capsule by mouth Daily.      loratadine (Claritin) 10 MG tablet Take 1 tablet by mouth Daily. 90 tablet 3    omeprazole (priLOSEC) 20 MG capsule Take 1 capsule by mouth Daily.      propranolol (INDERAL) 20 MG tablet Take 1 tablet by mouth Daily.      QUEtiapine (SEROquel) 300 MG tablet Take 1 tablet by mouth Every Night. 90 tablet 0    traMADol (ULTRAM) 50 MG tablet 1 tablet as needed Orally q12h PRN severe pain for 30 days      [START ON 7/3/2025] varenicline (Chantix Continuing Month Avi) 1 MG tablet Take 1 tablet by mouth 2 (Two) Times a Day for 56 days. 56 tablet 1    Varenicline Tartrate, Starter, 0.5 MG X 11 & 1 MG X 42 tablet therapy pack Take 0.5 mg by mouth Daily for 3 days, THEN 0.5 mg 2 (Two) Times a Day for 4 days, THEN 1 mg 2 (Two) Times a Day for 21 days. Take 0.5 mg po daily x 3 days, then 0.5 mg po bid x 4 days, then 1 mg po bid 1 each 0    vitamin D (ERGOCALCIFEROL) 1.25 MG (96928 UT) capsule capsule Take 1 capsule by mouth 1 (One) Time Per Week. 12 capsule 3         PROCEDURES  Procedures            PROGRESS, DATA ANALYSIS, CONSULTS, AND  MEDICAL DECISION MAKING  All labs have been independently interpreted by me.  All radiology studies have been reviewed by me. All EKG's have been independently viewed and interpreted by me.  Discussion below represents my analysis of pertinent findings related to patient's condition, differential diagnosis, treatment plan and final disposition.    This patient presents with chest pain that is more atypical in nature, and most likely secondary to non-cardiac causes. The differential diagnosis includes emergent causes like ACS, PE, pericarditis, myocarditis, thoracic aortic dissection, pneumothorax, pulmonary effusion, and pneumonia. It also includes more benign causes like bronchitis, gerd, esophageal spasm, anxiety/panic, pleurisy, costochondritis/chest wall pain, and biliary colic. I have low suspicion for acute PE (PERC/[facility]), pericarditis / myocarditis (no preceeding uri), thoracic aortic dissection (lack of risk factors and grossly equal radial pulses on exam), pneumothorax (clear to auscultation bilaterally to bases on exam), pneumonia or other acute infectious process (lack of infectious symptoms like cough). Plan to order CXR, serum labs including screening troponin, and EKG while monitoring patient and providing iv anxiolysis. Will reassess.    Clinical Scores:              Heart score calculation:    History slightly suspicious: 0  History moderately suspicious: +1  History highly suspicious: +2    EKG normal: 0  EKG non-specific repolarization disturbance: +1  EKG sig ST deviation: +2    Age <45: 0  Age 45-64: +1  Age >65: +2    No known risk factors: 0  1-2 risk factors: +1  3 or more risk factors: +2    Initial troponin less than the normal limit: 0  Initial troponin 1-3 times the normal limit: +1  Initial troponin greater than 3 times the normal limit: +2    Total score: 2  ___________  PERC Rules  1. Age<50 Yes  2. HR<100 Yes  3. O2 Sat on Room air >94% Yes  4. No Prior History of DVT/PE? Yes  5. No  recent Trauma or surgery? Yes  6. No Hemoptysis? Yes  7. No exogenous Estrogen? Yes  8. No Clinical Signs Suggesting DVT? Yes    The PERC rule only applies if all 8 criteria are met.    According to the PERC Study, there is a less than 2% risk of PE in this patient, if the clinician's pre-test probability is 15% or less.      ED Course as of 06/29/25 2110   Sun Jun 29, 2025   1722 EKG ER MD interpretation   Time: 16: 56  Rhythm and rate: Sinus rhythm at a rate of 99  Axis: Normal  P waves: Normal  QRS complexes: Normal  ST segments: no elevation nor depressions  T waves: no inversions   [AR]   1729 I viewed patient's chest x-ray and on my interpretation patient has normal heart size and no pulmonary consolidation [AR]   1840 I discussed all results with patient and answered all questions to the best of my ability. The patient was encouraged to follow up appropriately.      Routine discharge counseling was given, and the patient was instructed to promptly call or followup with their primary physician or even return to the ER if any worsening, changing or persistent symptoms.         [AR]      ED Course User Index  [AR] Jesica Magallanes MD             AS OF 21:10 EDT VITALS:    BP - 116/79  HR - 95  TEMP - 98.8 °F (37.1 °C) (Oral)  O2 SATS - 99%      COMPLEXITY OF CARE  Admission was considered but after careful review of the patient's presentation, physical examination, diagnostic results, and response to treatment the patient may be safely discharged with outpatient follow-up.    DIAGNOSIS  Final diagnoses:   Anxiety attack         DISPOSITION  ED Disposition       ED Disposition   Discharge    Condition   Stable    Comment   --                Please note that portions of this document were completed with a voice recognition program.    Note Disclaimer: At [facility], we believe that sharing information builds trust and better relationships. You are receiving this note because you recently visited [facility]. It is possible you will see health information before a provider has talked with you about it. This kind of information can be easy to misunderstand. To help you fully understand what it means for your health, we urge you to discuss this note with your provider.         Jesica Magallanes MD  06/29/25 [id number]

## 2025-06-29 NOTE — ED TRIAGE NOTES
Pt was brought in by ems from home for soa. Pt had anxiety and soa after losing hawkins. Ems states that she was tachypneic upon arrival, but was able to slow it down enroute

## 2025-06-29 NOTE — DISCHARGE INSTRUCTIONS
Rest at home avoiding any particularly strenuous activity today.     Eat small, frequent meals and drink plenty of fluids.  Continue all of your home medications.    Monitor for any signs of recurrent symptoms or worsening and see your primary doctor to discuss your ER visit while returning to the ER if any concerns as we discussed.

## 2025-06-30 LAB
QT INTERVAL: 376 MS
QTC INTERVAL: 482 MS

## 2025-07-01 ENCOUNTER — HOSPITAL ENCOUNTER (OUTPATIENT)
Facility: HOSPITAL | Age: 35
Setting detail: HOSPITAL OUTPATIENT SURGERY
Discharge: HOME | End: 2025-07-01
Attending: SURGERY | Admitting: SURGERY
Payer: COMMERCIAL

## 2025-07-01 ENCOUNTER — ANESTHESIA (OUTPATIENT)
Dept: GASTROENTEROLOGY | Facility: HOSPITAL | Age: 35
End: 2025-07-01
Payer: COMMERCIAL

## 2025-07-01 ENCOUNTER — ANESTHESIA EVENT (OUTPATIENT)
Dept: GASTROENTEROLOGY | Facility: HOSPITAL | Age: 35
End: 2025-07-01
Payer: COMMERCIAL

## 2025-07-01 VITALS
DIASTOLIC BLOOD PRESSURE: 81 MMHG | HEIGHT: 66 IN | WEIGHT: 281 LBS | OXYGEN SATURATION: 97 % | BODY MASS INDEX: 45.16 KG/M2 | SYSTOLIC BLOOD PRESSURE: 105 MMHG | RESPIRATION RATE: 16 BRPM | HEART RATE: 73 BPM

## 2025-07-01 DIAGNOSIS — E66.01 MORBID OBESITY WITH BMI OF 45.0-49.9, ADULT: ICD-10-CM

## 2025-07-01 DIAGNOSIS — R06.83 SNORING: ICD-10-CM

## 2025-07-01 DIAGNOSIS — J45.20 MILD INTERMITTENT ASTHMA WITHOUT COMPLICATION: ICD-10-CM

## 2025-07-01 DIAGNOSIS — M54.50 ACUTE EXACERBATION OF CHRONIC LOW BACK PAIN: ICD-10-CM

## 2025-07-01 DIAGNOSIS — K21.9 GASTROESOPHAGEAL REFLUX DISEASE, UNSPECIFIED WHETHER ESOPHAGITIS PRESENT: ICD-10-CM

## 2025-07-01 DIAGNOSIS — F41.0 PANIC ATTACK: ICD-10-CM

## 2025-07-01 DIAGNOSIS — F32.0 CURRENT MILD EPISODE OF MAJOR DEPRESSIVE DISORDER, UNSPECIFIED WHETHER RECURRENT: ICD-10-CM

## 2025-07-01 DIAGNOSIS — Z01.818 PREOPERATIVE TESTING: ICD-10-CM

## 2025-07-01 DIAGNOSIS — Z87.11 H/O GASTRIC ULCER: ICD-10-CM

## 2025-07-01 DIAGNOSIS — F41.1 GENERALIZED ANXIETY DISORDER: ICD-10-CM

## 2025-07-01 DIAGNOSIS — G89.29 ACUTE EXACERBATION OF CHRONIC LOW BACK PAIN: ICD-10-CM

## 2025-07-01 DIAGNOSIS — F51.01 PRIMARY INSOMNIA: ICD-10-CM

## 2025-07-01 PROCEDURE — 88305 TISSUE EXAM BY PATHOLOGIST: CPT | Performed by: SURGERY

## 2025-07-01 PROCEDURE — 25010000002 PROPOFOL 10 MG/ML EMULSION: Performed by: NURSE ANESTHETIST, CERTIFIED REGISTERED

## 2025-07-01 PROCEDURE — 25010000002 LIDOCAINE 2% SOLUTION: Performed by: NURSE ANESTHETIST, CERTIFIED REGISTERED

## 2025-07-01 PROCEDURE — 81025 URINE PREGNANCY TEST: CPT | Performed by: SURGERY

## 2025-07-01 PROCEDURE — 25810000003 LACTATED RINGERS PER 1000 ML: Performed by: SURGERY

## 2025-07-01 RX ORDER — SODIUM CHLORIDE, SODIUM LACTATE, POTASSIUM CHLORIDE, CALCIUM CHLORIDE 600; 310; 30; 20 MG/100ML; MG/100ML; MG/100ML; MG/100ML
30 INJECTION, SOLUTION INTRAVENOUS CONTINUOUS
Status: DISCONTINUED | OUTPATIENT
Start: 2025-07-01 | End: 2025-07-01 | Stop reason: HOSPADM

## 2025-07-01 RX ORDER — LIDOCAINE HYDROCHLORIDE 20 MG/ML
INJECTION, SOLUTION INFILTRATION; PERINEURAL AS NEEDED
Status: DISCONTINUED | OUTPATIENT
Start: 2025-07-01 | End: 2025-07-01 | Stop reason: SURG

## 2025-07-01 RX ORDER — SODIUM CHLORIDE 9 MG/ML
40 INJECTION, SOLUTION INTRAVENOUS AS NEEDED
Status: DISCONTINUED | OUTPATIENT
Start: 2025-07-01 | End: 2025-07-01 | Stop reason: HOSPADM

## 2025-07-01 RX ORDER — SODIUM CHLORIDE 0.9 % (FLUSH) 0.9 %
10 SYRINGE (ML) INJECTION AS NEEDED
Status: DISCONTINUED | OUTPATIENT
Start: 2025-07-01 | End: 2025-07-01 | Stop reason: HOSPADM

## 2025-07-01 RX ORDER — PROPOFOL 10 MG/ML
VIAL (ML) INTRAVENOUS AS NEEDED
Status: DISCONTINUED | OUTPATIENT
Start: 2025-07-01 | End: 2025-07-01 | Stop reason: SURG

## 2025-07-01 RX ORDER — SODIUM CHLORIDE 0.9 % (FLUSH) 0.9 %
10 SYRINGE (ML) INJECTION EVERY 12 HOURS SCHEDULED
Status: DISCONTINUED | OUTPATIENT
Start: 2025-07-01 | End: 2025-07-01 | Stop reason: HOSPADM

## 2025-07-01 RX ADMIN — PROPOFOL 160 MG: 10 INJECTION, EMULSION INTRAVENOUS at 08:17

## 2025-07-01 RX ADMIN — LIDOCAINE HYDROCHLORIDE 100 MG: 20 INJECTION, SOLUTION INFILTRATION; PERINEURAL at 08:17

## 2025-07-01 RX ADMIN — SODIUM CHLORIDE, POTASSIUM CHLORIDE, SODIUM LACTATE AND CALCIUM CHLORIDE 30 ML/HR: 600; 310; 30; 20 INJECTION, SOLUTION INTRAVENOUS at 08:05

## 2025-07-01 NOTE — ANESTHESIA POSTPROCEDURE EVALUATION
Patient: Alejo Pascal    Procedure Summary       Date: 07/01/25 Room / Location:  [facility]    Anesthesia Start: 0810 Anesthesia Stop: 0821    Procedure: ESOPHAGOGASTRODUODENOSCOPY WITH BIOPSY (Esophagus) Diagnosis:       Morbid obesity with BMI of 45.0-49.9, adult      Gastroesophageal reflux disease, unspecified whether esophagitis present      H/O gastric ulcer      Generalized anxiety disorder      Current mild episode of major depressive disorder, unspecified whether recurrent      Panic attack      Acute exacerbation of chronic low back pain      Mild intermittent asthma without complication      Primary insomnia      Snoring      Preoperative testing      (Morbid obesity with BMI of 45.0-49.9, adult [E66.01, Z68.42])      (Gastroesophageal reflux disease, unspecified whether esophagitis present [K21.9])      (H/O gastric ulcer [Z87.11])      (Generalized anxiety disorder [F41.1])      (Current mild episode of major depressive disorder, unspecified whether recurrent [F32.0])      (Panic attack [F41.0])      (Acute exacerbation of chronic low back pain [M54.50, G89.29])      (Mild intermittent asthma without complication [J45.20])      (Primary insomnia [F51.01])      (Snoring [R06.83])      (Preoperative testing [Z01.818])    Surgeons: Zay Altman Jr., MD Provider: Orlando Mchugh MD    Anesthesia Type: MAC ASA Status: 3            Anesthesia Type: MAC    Vitals  Vitals Value Taken Time   /76 07/01/25 08:23   Temp     Pulse 79 07/01/25 08:29   Resp 16 07/01/25 08:22   SpO2 98 % 07/01/25 08:29   Vitals shown include unfiled device data.        Post Anesthesia Care and Evaluation    Patient location during evaluation: PACU  Patient participation: complete - patient participated  Level of consciousness: awake and alert  Pain management: adequate    Airway patency: patent  Anesthetic complications: No anesthetic complications    Cardiovascular status:  acceptable  Respiratory status: acceptable  Hydration status: acceptable    Comments: --------------------            07/01/25 0822     --------------------   BP:       100/76     Pulse:      76       Resp:       16       SpO2:      98%      --------------------

## 2025-07-01 NOTE — OP NOTE
Surgeon: Zay Altman Jr., M.D.    Preoperative Diagnosis: GERD    Postoperative Diagnosis: Gastritis    Procedure Performed: Transoral esophagogastroduodenoscopy with gastric antral biopsies    Indications: 35-year-old female who presents for preoperative evaluation.  Patient does take PPI on regular basis    Procedure:     The procedure, indications, preparation and potential complications were explained to the patient, who indicated understanding and signed the corresponding consent forms.  The patient was identified, taken to the endoscopy suite, and placed on the left side down decubitus position.  The patient underwent a MAC anesthesia and was appropriately monitored through the case by the anesthesia personnel with continuous pulse oximetry, blood pressure, and cardiac monitoring.  A bite block was placed.  After adequate IV sedation and using a transoral technique a lubed flexible endoscope was placed in the hypopharynx and advanced to the second portion of the duodenum without difficulty. The scope was then withdrawn back into the antrum of the stomach.  Cold forcep biopsies of the antrum were taken to rule out Helicobacter pylori.  The scope was retroflexed noting the body, fundus and cardia.  The scope was then withdrawn back into the esophagus after decompressing the stomach.  The Z line was noted and GE junction measured from the incisors.  The scope was then completely withdrawn.  The patient tolerated the procedure well and left the endoscopy suite in stable condition.  The findings are listed below.    Duodenum: Unremarkable  Antrum: Erythema throughout antrum  Body/Fundus: Patchy erythema  Cardia: Unremarkable  Esophagus: Z-line regular, no erosive esophagitis    Recommendations:     Await biopsy results and follow-up in the office

## 2025-07-01 NOTE — ANESTHESIA PREPROCEDURE EVALUATION
Anesthesia Evaluation                  Airway   Mallampati: II  Dental    (+) poor dentition    Pulmonary    (+) a smoker Current, asthma,  Cardiovascular     Rhythm: regular  Rate: normal    (+) hyperlipidemia      Neuro/Psych  (+) psychiatric history Anxiety, Depression and Bipolar  GI/Hepatic/Renal/Endo    (+) morbid obesity, GERD, renal disease- stones    Musculoskeletal     Abdominal    Substance History   (+) alcohol use     OB/GYN          Other                    Anesthesia Plan    ASA 3     MAC     (Heavy smoking history and currently  Poor dentition  BMI    I have reviewed the patient's history with the patient and the chart, including all pertinent laboratory results and imaging. I have explained the risks of anesthesia including but not limited to dental damage, corneal abrasion, nerve injury, MI, stroke, and death. Questions asked and answered. Anesthetic plan discussed with patient and team as indicated. Patient expressed understanding of the above.    )  intravenous induction     Anesthetic plan, risks, benefits, and alternatives have been provided, discussed and informed consent has been obtained with: patient.    CODE STATUS:

## 2025-07-01 NOTE — DISCHARGE INSTRUCTIONS
For the rest of the day patient needs to be with a responsible adult.    For the rest of the day DO NOT work, drive, operate heavy machinery, drink alcohol, make important decisions or sign legal documents.    Advance to regular diet as tolerated, if your stomach is upset start with a light/bland diet.    Follow recommendations on procedure report if provided by your doctor.    Call Dr Altman for problems [phone]    If these problems occur come to ER: large amounts of bleeding, trouble breathing, repeated vomiting, severe unrelieved pain, fever or chills.

## 2025-07-08 ENCOUNTER — TELEPHONE (OUTPATIENT)
Dept: SLEEP MEDICINE | Facility: HOSPITAL | Age: 35
End: 2025-07-08
Payer: COMMERCIAL

## 2025-07-08 NOTE — TELEPHONE ENCOUNTER
Lvm about failed HST. Device did not record enough time, only 3.5hrs. Pt will need to schedule a repeat.

## 2025-07-22 ENCOUNTER — HOSPITAL ENCOUNTER (OUTPATIENT)
Dept: SLEEP MEDICINE | Facility: HOSPITAL | Age: 35
Discharge: HOME | End: 2025-07-22
Admitting: NURSE PRACTITIONER
Payer: COMMERCIAL

## 2025-07-22 PROCEDURE — G0399 HOME SLEEP TEST/TYPE 3 PORTA: HCPCS

## 2025-07-24 ENCOUNTER — TELEPHONE (OUTPATIENT)
Dept: SLEEP MEDICINE | Facility: HOSPITAL | Age: 35
End: 2025-07-24
Payer: COMMERCIAL

## 2025-07-24 DIAGNOSIS — G47.33 OSA (OBSTRUCTIVE SLEEP APNEA): Primary | ICD-10-CM

## 2025-07-24 DIAGNOSIS — R06.83 SNORING: ICD-10-CM

## 2025-07-24 DIAGNOSIS — E66.01 MORBID OBESITY WITH BMI OF 45.0-49.9, ADULT: ICD-10-CM

## 2025-08-21 DIAGNOSIS — F51.01 PRIMARY INSOMNIA: ICD-10-CM

## 2025-08-21 RX ORDER — QUETIAPINE FUMARATE 300 MG/1
300 TABLET, FILM COATED ORAL NIGHTLY
Qty: 90 TABLET | Refills: 0 | Status: SHIPPED | OUTPATIENT
Start: 2025-08-21

## (undated) DEVICE — BLCK/BITE BLOX W/DENTL/RIM W/STRAP 54F

## (undated) DEVICE — LOU D & C HYSTEROSCOPY: Brand: MEDLINE INDUSTRIES, INC.

## (undated) DEVICE — ADAPT CLN BIOGUARD AIR/H2O DISP

## (undated) DEVICE — GLV SURG SIGNATURE ESSENTIAL PF LTX SZ7.5

## (undated) DEVICE — SOL NACL 0.9PCT 1000ML

## (undated) DEVICE — STRAP STIRUP SLP RNG 19X3.5IN DISP

## (undated) DEVICE — TUBING, SUCTION, 1/4" X 10', STRAIGHT: Brand: MEDLINE

## (undated) DEVICE — SENSR O2 OXIMAX FNGR A/ 18IN NONSTR

## (undated) DEVICE — HYPODERMIC SAFETY NEEDLE: Brand: MONOJECT

## (undated) DEVICE — 1000ML,PRESSURE INFUSER W/STOPCOCK: Brand: MEDLINE

## (undated) DEVICE — LN SMPL CO2 SHTRM SD STREAM W/M LUER

## (undated) DEVICE — PROB ABL ENDOMTRL NOVASURE/G4 W/SURESND

## (undated) DEVICE — EXTENSION SET, MALE LUER LOCK ADAPTER WITH RETRACTABLE COLLAR

## (undated) DEVICE — STPCK 3WY D201 DISCOFIX

## (undated) DEVICE — KT ORCA ORCAPOD DISP STRL

## (undated) DEVICE — SEAL HYSTERSCOPE/OUTFLOW CHANNEL MYOSURE

## (undated) DEVICE — BLCK/BITE BLOX WO/DENTL/RIM W/STRAP 54F

## (undated) DEVICE — FRCP BX RADJAW4 NDL 2.8 240CM LG OG BX40

## (undated) DEVICE — SYRINGE, LUER LOCK, 60ML: Brand: MEDLINE

## (undated) DEVICE — MSK PROC CURAPLEX O2 2/ADAPT 7FT

## (undated) DEVICE — SET,IRRIGATION,CYSTO/TUR,90": Brand: MEDLINE

## (undated) DEVICE — DRAPE,UNDERBUTTOCKS,PCH,STERILE: Brand: MEDLINE